# Patient Record
Sex: FEMALE | Race: BLACK OR AFRICAN AMERICAN | NOT HISPANIC OR LATINO | Employment: OTHER | URBAN - METROPOLITAN AREA
[De-identification: names, ages, dates, MRNs, and addresses within clinical notes are randomized per-mention and may not be internally consistent; named-entity substitution may affect disease eponyms.]

---

## 2017-08-18 ENCOUNTER — DOCUMENTATION ONLY (OUTPATIENT)
Dept: FAMILY MEDICINE | Facility: CLINIC | Age: 77
End: 2017-08-18

## 2017-08-18 NOTE — PROGRESS NOTES
Pre-Visit Chart Review  For Appointment Scheduled on 08/21/2017      Health Maintenance Due   Topic Date Due    Foot Exam  09[birth year]01/1950    TETANUS VACCINE  09/01/1960    DEXA SCAN  09/01/1982    Colonoscopy  09/01/1992    Zoster Vaccine  09/01/2002    Pneumococcal (65+) (1 of 2 - PCV13) 09/01/2007    Urine Microalbumin  03/27/2016    Hemoglobin A1c  06/18/2016    Eye Exam  05/24/2017    Influenza Vaccine  08/01/2017

## 2017-08-21 ENCOUNTER — OFFICE VISIT (OUTPATIENT)
Dept: FAMILY MEDICINE | Facility: CLINIC | Age: 77
End: 2017-08-21
Payer: MEDICARE

## 2017-08-21 VITALS
WEIGHT: 269.19 LBS | DIASTOLIC BLOOD PRESSURE: 67 MMHG | HEART RATE: 95 BPM | BODY MASS INDEX: 40.8 KG/M2 | SYSTOLIC BLOOD PRESSURE: 116 MMHG | TEMPERATURE: 98 F | HEIGHT: 68 IN

## 2017-08-21 DIAGNOSIS — E66.01 MORBID OBESITY, UNSPECIFIED OBESITY TYPE: ICD-10-CM

## 2017-08-21 DIAGNOSIS — E11.59 HYPERTENSION ASSOCIATED WITH DIABETES: ICD-10-CM

## 2017-08-21 DIAGNOSIS — E11.69 HYPERLIPIDEMIA ASSOCIATED WITH TYPE 2 DIABETES MELLITUS: ICD-10-CM

## 2017-08-21 DIAGNOSIS — E03.9 HYPOTHYROIDISM, UNSPECIFIED TYPE: ICD-10-CM

## 2017-08-21 DIAGNOSIS — I15.2 HYPERTENSION ASSOCIATED WITH DIABETES: ICD-10-CM

## 2017-08-21 DIAGNOSIS — E11.42 DIABETIC POLYNEUROPATHY ASSOCIATED WITH TYPE 2 DIABETES MELLITUS: ICD-10-CM

## 2017-08-21 DIAGNOSIS — E78.5 HYPERLIPIDEMIA ASSOCIATED WITH TYPE 2 DIABETES MELLITUS: ICD-10-CM

## 2017-08-21 DIAGNOSIS — I73.9 PVD (PERIPHERAL VASCULAR DISEASE) WITH CLAUDICATION: ICD-10-CM

## 2017-08-21 DIAGNOSIS — Z23 NEED FOR VACCINATION FOR STREP PNEUMONIAE: Primary | ICD-10-CM

## 2017-08-21 DIAGNOSIS — Z00.00 ENCOUNTER FOR PREVENTIVE HEALTH EXAMINATION: ICD-10-CM

## 2017-08-21 PROCEDURE — 99999 PR PBB SHADOW E&M-EST. PATIENT-LVL III: CPT | Mod: PBBFAC,,, | Performed by: PHYSICIAN ASSISTANT

## 2017-08-21 PROCEDURE — 90732 PPSV23 VACC 2 YRS+ SUBQ/IM: CPT | Mod: S$GLB,,, | Performed by: PHYSICIAN ASSISTANT

## 2017-08-21 PROCEDURE — G0439 PPPS, SUBSEQ VISIT: HCPCS | Mod: S$GLB,,, | Performed by: PHYSICIAN ASSISTANT

## 2017-08-21 PROCEDURE — G0009 ADMIN PNEUMOCOCCAL VACCINE: HCPCS | Mod: S$GLB,,, | Performed by: PHYSICIAN ASSISTANT

## 2017-08-21 RX ORDER — METFORMIN HYDROCHLORIDE 500 MG/1
500 TABLET ORAL 2 TIMES DAILY WITH MEALS
COMMUNITY

## 2017-08-21 RX ORDER — METOPROLOL SUCCINATE 50 MG/1
50 TABLET, EXTENDED RELEASE ORAL 2 TIMES DAILY
Status: ON HOLD | COMMUNITY
End: 2023-06-26 | Stop reason: HOSPADM

## 2017-08-21 RX ORDER — LISINOPRIL 5 MG/1
5 TABLET ORAL DAILY
COMMUNITY
End: 2020-06-03

## 2017-08-21 RX ORDER — GABAPENTIN 300 MG/1
300 CAPSULE ORAL 2 TIMES DAILY
Status: ON HOLD | COMMUNITY
End: 2023-09-05

## 2017-08-21 RX ORDER — CILOSTAZOL 100 MG/1
100 TABLET ORAL 2 TIMES DAILY
COMMUNITY
End: 2022-12-08 | Stop reason: CLARIF

## 2017-08-21 NOTE — PROGRESS NOTES
"Mary Cryer presented for a  Medicare AWV and comprehensive Health Risk Assessment today. The following components were reviewed and updated:    · Medical history  · Family History  · Social history  · Allergies and Current Medications  · Health Risk Assessment  · Health Maintenance  · Care Team     ** See Completed Assessments for Annual Wellness Visit within the encounter summary.**       The following assessments were completed:  · Living Situation  · CAGE  · Depression Screening  · Timed Get Up and Go  · Whisper Test  · Cognitive Function Screening  · Nutrition Screening  · ADL Screening  · PAQ Screening    Vitals:    08/21/17 0909   BP: 116/67   BP Location: Left arm   Patient Position: Sitting   BP Method: Large (Automatic)   Pulse: 95   Temp: 97.8 °F (36.6 °C)   TempSrc: Oral   Weight: 122.1 kg (269 lb 2.9 oz)   Height: 5' 8" (1.727 m)     Body mass index is 40.93 kg/m².  Physical Exam   Constitutional: She is oriented to person, place, and time. She appears well-developed and well-nourished.   Cardiovascular:   Pulses:       Dorsalis pedis pulses are 2+ on the right side, and 2+ on the left side.        Posterior tibial pulses are 2+ on the right side, and 2+ on the left side.   Pulmonary/Chest: Effort normal.   Musculoskeletal:        Right foot: There is normal range of motion and no deformity.        Left foot: There is normal range of motion and no deformity.   Feet:   Right Foot:   Protective Sensation: 5 sites tested. 5 sites sensed.   Skin Integrity: Negative for ulcer, blister, skin breakdown, erythema, warmth, callus or dry skin.   Left Foot:   Protective Sensation: 5 sites tested. 5 sites sensed.   Skin Integrity: Negative for ulcer, blister, skin breakdown, erythema, warmth, callus or dry skin.   Neurological: She is alert and oriented to person, place, and time.   Psychiatric: She has a normal mood and affect. Her behavior is normal. Judgment and thought content normal.               Diagnoses and " health risks identified today and associated recommendations/orders:    Karina was seen today for health risk assessment.    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, with long-term current use of insulin  Comments:  uncontrolled, managed by pcp, records requested    Hyperlipidemia associated with type 2 diabetes mellitus  Comments:  controlled, continue meds    Hypertension associated with diabetes  Comments:  controlled, continue meds    Diabetic polyneuropathy associated with type 2 diabetes mellitus  Comments:  uncontrolled, followed by pcp    Hypothyroidism, unspecified type  Comments:  controlled, continue meds    Encounter for preventive health examination    Other orders  -     Cancel: Microalbumin/creatinine urine ratio; Future        Provided Karina with a 5-10 year written screening schedule and personal prevention plan. Recommendations were developed using the USPSTF age appropriate recommendations. Education, counseling, and referrals were provided as needed. After Visit Summary printed and given to patient which includes a list of additional screenings\tests needed.    No Follow-up on file.    MOSES Mendez     Patient readiness: acceptance and barriers:none    During the course of the visit the patient was educated and counseled about the following:     Diabetes:  Discussed general issues about diabetes pathophysiology and management.  Hypertension:   Regular aerobic exercise.  Obesity:   General weight loss/lifestyle modification strategies discussed (elicit  support from others; identify saboteurs; non-food rewards, etc).    Goals: Diabetes: Maintain Hemoglobin A1C below 7, Hypertension: Reduce Blood Pressure and Obesity: Reduce calorie intake and BMI    Did patient meet goals/outcomes: No    The following self management tools provided: blood pressure log  blood glucose log  excercise log    Patient Instructions (the written plan) was given to the patient/family.     Time spent with patient: 55 minutes

## 2017-08-21 NOTE — Clinical Note
Primary Care Providers: Sharan John MD, MD (General)  Your patient was seen today for a HRA visit. Gap(s) in care (HEDIS gaps) have been identified during this visit that require additional testing and possible follow up.  Orders Placed This Encounter     (In Office Administered) Pneumococcal Polysaccharide Vaccine (23 Valent) (SQ/IM)   These orders were placed using [facility] approved protocol and any results will be forwarded to your office for appropriate follow up. I have included a copy of my visit note; please review the note and feel free to contact me with any questions.   Thank you for allowing me to participate in the care of your patients. MOSES Mendez

## 2017-08-21 NOTE — PATIENT INSTRUCTIONS
Weight Management: Getting Started  Healthy bodies come in all shapes and sizes. Not all bodies are made to be thin. For some people, a healthy weight is higher than the average weight listed on weight charts. Your healthcare provider can help you decide on a healthy weight for you.    Reasons to lose weight  Losing weight can help with some health problems, such as high blood pressure, heart disease, diabetes, sleep apnea, and arthritis. You may also feel more energy.  Set your long-term goal  Your goal doesn't even have to be a specific weight. You may decide on a fitness goal (such as being able to walk 10 miles a week), or a health goal (such as lowering your blood pressure). Choose a goal that is measurable and reasonable, so you know when you've reached it. A goal of reaching a BMI of less than 25 is not always reasonable (or possible).   Make an action plan  Habits dont change overnight. Setting your goals too high can leave you feeling discouraged if you cant reach them. Be realistic. Choose one or two small changes you can make now. Set an action plan for how you are going to make these changes. When you can stick to this plan, keep making a few more small changes. Taking small steps will help you stay on the path to success.  Track your progress  Write down your goals. Then, keep a daily record of your progress. Write down what you eat and how active you are. This record lets you look back on how much youve done. It may also help when youre feeling frustrated. Reward yourself for success. Even if you dont reach every goal, give yourself credit for what you do get done.  Get support  Encouragement from others can help make losing weight easier. Ask your family members and friends for support. They may even want to join you. Also look to your healthcare provider, registered dietitian, and  for help. Your local hospital can give you more information about nutrition, exercise, and  weight loss.  Date Last Reviewed: 1/31/2016 © 2000-2016 [employer]. [address] [place] [address], [place] [address]. All rights reserved. This information is not intended as a substitute for professional medical care. Always follow your healthcare professional's instructions.        Walking for Fitness  Fitness walking has something for everyone, even people who are already fit. Walking is one of the safest ways to condition your body aerobically. It can boost energy, help you lose weight, and reduce stress.    Physical benefits  · Walking strengthens your heart and lungs, and tones your muscles.  · When walking, your feet land with less impact than in other sports. This reduces chances of muscle, bone, and joint injury.  · Regular walking improves your cholesterol levels and lowers your risk of heart disease. And it helps you control your blood sugar if you have diabetes.  · Walking is a weight-bearing activity, which helps maintain bone density. This can help prevent osteoporosis.  Personal rewards  · Taking walks can help you relax and manage stress. And fitness walking may make you feel better about yourself.  · Walking can help you sleep better at night and make you less likely to be depressed.  · Regular walking may help maintain your memory as you get older.  · Walking is a great way to spend extra time with friends and family members. Be sure to invite your dog along!  Q&A about fitness walking  Q: Will walking keep me fit?  A: Yes. Regular walking at the right pace gives you all the benefits of other aerobic activities, such as jogging and swimming.  Q: Will walking help me lose weight and keep it off?  A: Yes. Per mile, walking can burn as many calories as jogging. Your health care provider can help work walking into your weight-loss plan.  Q: Is walking safe for my health?  A: Yes. Walking is safe if you have high blood pressure, diabetes, heart disease, or other conditions. Talk to your health  care provider before you start.  Date Last Reviewed: 5/9/2015  © 9766-0126 [employer]. [address] [place] [address], [place] [address]. All rights reserved. This information is not intended as a substitute for professional medical care. Always follow your healthcare professional's instructions.          Counseling and Referral of Other Preventative  (Italic type indicates deductible and co-insurance are waived)    Patient Name: Mary Cryer  Today's Date: 8/21/2017      SERVICE LIMITATIONS RECOMMENDATION    Vaccines    · Pneumococcal (once after 65)    · Influenza (annually)    · Hepatitis B (if medium/high risk)    · Prevnar 13      Hepatitis B medium/high risk factors:       - End-stage renal disease       - Hemophiliacs who received Factor VII or         IX concentrates       - Clients of institutions for the mentally             retarded       - Persons who live in the same house as          a HepB carrier       - Homosexual men       - Illicit injectable drug abusers     Pneumococcal: Done, no repeat necessary     Influenza: Scheduled - see appointments     Hepatitis B: N/A     Prevnar 13: Done, no repeat necessary    Mammogram (biennial age 50-74)  Annually (age 40 or over)  Done this year, repeat every year    Pap (up to age 70 and after 70 if unknown history or abnormal study last 10 years)    Done this year, repeat every year     The USPSTF recommends against screening for cervical cancer in women who have had a hysterectomy with removal of the cervix and who do not have a history of a high-grade precancerous lesion (cervical intraepithelial neoplasia [MARTA] grade 2 or 3) or cervical cancer.     Colorectal cancer screening (to age 75)    · Fecal occult blood test (annual)  · Flexible sigmoidoscopy (5y)  · Screening colonoscopy (10y)  · Barium enema   N/A    Diabetes self-management training (no USPSTF recommendations)  Requires referral by treating physician for patient with diabetes or renal disease.  10 hours of initial DSMT sessions of no less than 30 minutes each in a continuous 12-month period. 2 hours of follow-up DSMT in subsequent years.  Done this year, repeat every year    Bone mass measurements (age 65 & older, biennial)  Requires diagnosis related to osteoporosis or estrogen deficiency. Biennial benefit unless patient has history of long-term glucocorticoid  Done this year, repeat every year    Glaucoma screening (no USPSTF recommendation)  Diabetes mellitus, family history   , age 50 or over    American, age 65 or over  Recommend follow up with eye care professional regularly    Medical nutrition therapy for diabetes or renal disease (no recommended schedule)  Requires referral by treating physician for patient with diabetes or renal disease or kidney transplant within the past 3 years.  Can be provided in same year as diabetes self-management training (DSMT), and CMS recommends medical nutrition therapy take place after DSMT. Up to 3 hours for initial year and 2 hours in subsequent years.  Done this year, repeat every year    Cardiovascular screening blood tests (every 5 years)  · Fasting lipid panel  Order as a panel if possible  Done this year, repeat every year    Diabetes screening tests (at least every 3 years, Medicare covers annually or at 6-month intervals for prediabetic patients)  · Fasting blood sugar (FBS) or glucose tolerance test (GTT)  Patient must be diagnosed with one of the following:       - Hypertension       - Dyslipidemia       - Obesity (BMI 30kg/m2)       - Previous elevated impaired FBS or GTT       ... or any two of the following:       - Overweight (BMI 25 but <30)       - Family history of diabetes       - Age 65 or older       - History of gestational diabetes or birth of baby weighing more than 9 pounds  Done this year, repeat every year    HIV screening (annually for increased risk patients)  · HIV-1 and HIV-2 by EIA, or NAVEEN, rapid antibody  test or oral mucosa transudate  Patients must be at increased risk for HIV infection per USPSTF guidelines or pregnant. Tests covered annually for patient at increased risk or as requested by the patient. Pregnant patients may receive up to 3 tests during pregnancy.  Risks discussed, screening is not recommended    Smoking cessation counseling (up to 8 sessions per year)  Patients must be asymptomatic of tobacco-related conditions to receive as a preventative service.  Non-smoker    Subsequent annual wellness visit  At least 12 months since last AWV  Return in one year     The following information is provided to all patients.  This information is to help you find resources for any of the problems found today that may be affecting your health:                Living healthy guide: www.[facility].[place].[facility]      Understanding Diabetes: www.diabetes.org      Eating healthy: www.cdc.gov/healthyweight      CDC home safety checklist: www.cdc.gov/steadi/patient.html      Agency on Aging: www.goea.[place].[facility]      Alcoholics anonymous (AA): www.aa.org      Physical Activity: www.dylan.nih.gov/sh6vwtf      Tobacco use: www.quitwithusla.org

## 2017-12-27 RX ORDER — LISINOPRIL 5 MG/1
TABLET ORAL
Qty: 90 TABLET | Refills: 3 | OUTPATIENT
Start: 2017-12-27

## 2017-12-27 RX ORDER — POTASSIUM CHLORIDE 600 MG/1
CAPSULE, EXTENDED RELEASE ORAL
Qty: 90 CAPSULE | Refills: 2 | OUTPATIENT
Start: 2017-12-27

## 2018-01-15 RX ORDER — CILOSTAZOL 100 MG/1
TABLET ORAL
Qty: 180 TABLET | Refills: 2 | OUTPATIENT
Start: 2018-01-15

## 2018-03-15 RX ORDER — GABAPENTIN 300 MG/1
CAPSULE ORAL
Qty: 270 CAPSULE | Refills: 2 | OUTPATIENT
Start: 2018-03-15

## 2018-04-17 RX ORDER — LISINOPRIL 5 MG/1
TABLET ORAL
Qty: 90 TABLET | Refills: 3 | OUTPATIENT
Start: 2018-04-17

## 2018-04-24 RX ORDER — LEVOTHYROXINE SODIUM 25 UG/1
TABLET ORAL
Qty: 90 TABLET | Refills: 3 | OUTPATIENT
Start: 2018-04-24

## 2019-12-04 ENCOUNTER — OFFICE VISIT (OUTPATIENT)
Dept: PODIATRY | Facility: CLINIC | Age: 79
End: 2019-12-04
Payer: MEDICARE

## 2019-12-04 ENCOUNTER — HOSPITAL ENCOUNTER (OUTPATIENT)
Dept: RADIOLOGY | Facility: CLINIC | Age: 79
Discharge: HOME | End: 2019-12-04
Attending: PODIATRIST
Payer: MEDICARE

## 2019-12-04 VITALS
DIASTOLIC BLOOD PRESSURE: 78 MMHG | SYSTOLIC BLOOD PRESSURE: 138 MMHG | HEIGHT: 68 IN | HEART RATE: 82 BPM | WEIGHT: 269 LBS | BODY MASS INDEX: 40.77 KG/M2

## 2019-12-04 DIAGNOSIS — I73.9 PVD (PERIPHERAL VASCULAR DISEASE) WITH CLAUDICATION: Primary | ICD-10-CM

## 2019-12-04 DIAGNOSIS — M79.675 TOE PAIN, LEFT: ICD-10-CM

## 2019-12-04 DIAGNOSIS — M21.6X2 PLANTARFLEXION DEFORMITY OF LEFT FOOT: ICD-10-CM

## 2019-12-04 DIAGNOSIS — M20.41 HAMMERTOE, BILATERAL: ICD-10-CM

## 2019-12-04 DIAGNOSIS — E11.49 TYPE II DIABETES MELLITUS WITH NEUROLOGICAL MANIFESTATIONS: ICD-10-CM

## 2019-12-04 DIAGNOSIS — M20.42 HAMMERTOE, BILATERAL: ICD-10-CM

## 2019-12-04 PROCEDURE — 11721 ROUTINE FOOT CARE: ICD-10-PCS | Mod: Q9,S$GLB,, | Performed by: PODIATRIST

## 2019-12-04 PROCEDURE — 1159F MED LIST DOCD IN RCRD: CPT | Mod: S$GLB,,, | Performed by: PODIATRIST

## 2019-12-04 PROCEDURE — 1101F PR PT FALLS ASSESS DOC 0-1 FALLS W/OUT INJ PAST YR: ICD-10-PCS | Mod: S$GLB,,, | Performed by: PODIATRIST

## 2019-12-04 PROCEDURE — 3078F PR MOST RECENT DIASTOLIC BLOOD PRESSURE < 80 MM HG: ICD-10-PCS | Mod: S$GLB,,, | Performed by: PODIATRIST

## 2019-12-04 PROCEDURE — 99203 OFFICE O/P NEW LOW 30 MIN: CPT | Mod: 25,S$GLB,, | Performed by: PODIATRIST

## 2019-12-04 PROCEDURE — 73630 X-RAY EXAM OF FOOT: CPT | Mod: LT,S$GLB,, | Performed by: PODIATRIST

## 2019-12-04 PROCEDURE — 3075F PR MOST RECENT SYSTOLIC BLOOD PRESS GE 130-139MM HG: ICD-10-PCS | Mod: S$GLB,,, | Performed by: PODIATRIST

## 2019-12-04 PROCEDURE — 3078F DIAST BP <80 MM HG: CPT | Mod: S$GLB,,, | Performed by: PODIATRIST

## 2019-12-04 PROCEDURE — 1101F PT FALLS ASSESS-DOCD LE1/YR: CPT | Mod: S$GLB,,, | Performed by: PODIATRIST

## 2019-12-04 PROCEDURE — 1159F PR MEDICATION LIST DOCUMENTED IN MEDICAL RECORD: ICD-10-PCS | Mod: S$GLB,,, | Performed by: PODIATRIST

## 2019-12-04 PROCEDURE — 11721 DEBRIDE NAIL 6 OR MORE: CPT | Mod: Q9,S$GLB,, | Performed by: PODIATRIST

## 2019-12-04 PROCEDURE — 73630 XR FOOT COMPLETE 3 VIEW LEFT: ICD-10-PCS | Mod: LT,S$GLB,, | Performed by: PODIATRIST

## 2019-12-04 PROCEDURE — 3075F SYST BP GE 130 - 139MM HG: CPT | Mod: S$GLB,,, | Performed by: PODIATRIST

## 2019-12-04 PROCEDURE — 99203 PR OFFICE/OUTPT VISIT, NEW, LEVL III, 30-44 MIN: ICD-10-PCS | Mod: 25,S$GLB,, | Performed by: PODIATRIST

## 2019-12-04 RX ORDER — CLOPIDOGREL BISULFATE 75 MG/1
75 TABLET ORAL DAILY
COMMUNITY
Start: 2019-11-12 | End: 2022-04-20

## 2019-12-04 RX ORDER — PEN NEEDLE, DIABETIC 29 G X1/2"
NEEDLE, DISPOSABLE MISCELLANEOUS
COMMUNITY

## 2019-12-04 RX ORDER — MELOXICAM 7.5 MG/1
7.5 TABLET ORAL DAILY
Status: ON HOLD | COMMUNITY
Start: 2019-04-02 | End: 2022-07-21 | Stop reason: HOSPADM

## 2019-12-04 RX ORDER — METOPROLOL TARTRATE 50 MG/1
25 TABLET ORAL 2 TIMES DAILY
COMMUNITY
Start: 2019-04-02 | End: 2022-12-08 | Stop reason: CLARIF

## 2019-12-04 RX ORDER — HUMAN INSULIN 100 [IU]/ML
6 INJECTION, SOLUTION SUBCUTANEOUS
COMMUNITY
Start: 2019-10-04

## 2019-12-04 RX ORDER — ALBUTEROL SULFATE 90 UG/1
2 AEROSOL, METERED RESPIRATORY (INHALATION) EVERY 6 HOURS PRN
COMMUNITY
Start: 2018-05-30

## 2019-12-04 RX ORDER — ASPIRIN 81 MG/1
81 TABLET ORAL DAILY
COMMUNITY

## 2019-12-04 RX ORDER — ROSUVASTATIN CALCIUM 40 MG/1
40 TABLET, COATED ORAL DAILY
COMMUNITY

## 2019-12-04 RX ORDER — INSULIN GLARGINE 100 [IU]/ML
35 INJECTION, SOLUTION SUBCUTANEOUS DAILY
COMMUNITY
Start: 2019-08-20

## 2019-12-04 RX ORDER — TRIAMTERENE/HYDROCHLOROTHIAZID 37.5-25 MG
1 TABLET ORAL DAILY
COMMUNITY
Start: 2019-04-29

## 2019-12-04 RX ORDER — AMOXICILLIN 500 MG
2 CAPSULE ORAL DAILY
COMMUNITY
End: 2022-04-20

## 2019-12-04 NOTE — PROGRESS NOTES
[address] [facility] [address]  [place] [address]  Phone: [phone]   [phone]    Patient's PCP:Sharan John MD  Referring Provider: No ref. provider found    Subjective:      Chief Complaint:: Nail Care (left first) and Foot Pain (under first toe)    HPI  Mary Cynthia Cryer is a 79 y.o. female who presents with a complaint of  First toenail problem.Onset of the symptoms was I had a sore at the base of the nail end of Septemeber. I dressed it daily with neosporin and betadine.The nail fell off and grew back different.Also having some pain under my big toe off and on about a month after the nail problem.  Patient also states that the distal tips of her toes hurt.  She states she slides in closed toe shoes.  Therefore she states she cant wear closed in shoes.Pain with walking and standing.It doesn't bother me with open toe shoes. Patients rates pain 8/10 on pain scale.who presents to the clinic for a diabetic foot exam.Patient states she cant walk very far without leg pain. Seeing Dr. Armas.      Systemic Doctor: Hollie Morales MD  Date Last Seen: 11-19  Blood Sugar: 141  Hemoglobin A1c:7     Vitals:    12/04/19 1006   BP: 138/78   Pulse: 82     Shoe Size:   11  Past Surgical History:   Procedure Laterality Date    CORONARY ARTERY BYPASS GRAFT      HYSTERECTOMY      partial     Past Medical History:   Diagnosis Date    Allergy     Diabetes mellitus     Diabetes mellitus, type 2     Hyperlipidemia     Hypertension     Obesity     Peripheral vascular disease     Thyroid disease     Type 2 diabetes mellitus      Family History   Problem Relation Age of Onset    Alzheimer's disease Mother     Heart disease Father         MI    No Known Problems Sister     Diabetes Brother     No Known Problems Daughter     No Known Problems Son     No Known Problems Sister     No Known Problems Sister     No Known Problems Sister     No Known Problems Sister     Heart disease Brother     No Known  Problems Brother     No Known Problems Son     No Known Problems Son         Social History:   Marital Status:   Alcohol History:  reports that she does not drink alcohol.  Tobacco History:  reports that she has been smoking. She has a 30.00 pack-year smoking history. She has never used smokeless tobacco.  Drug History:  reports that she does not use drugs.    Review of patient's allergies indicates:   Allergen Reactions    Tetracyclines Swelling       Current Outpatient Medications   Medication Sig Dispense Refill    albuterol (PROVENTIL/VENTOLIN HFA) 90 mcg/actuation inhaler Inhale 2 puffs into the lungs every 6 (six) hours as needed.      aspirin (ECOTRIN) 81 MG EC tablet Take 81 mg by mouth.      cilostazol (PLETAL) 100 MG Tab Take 50 mg by mouth 2 (two) times daily.      clopidogrel (PLAVIX) 75 mg tablet       gabapentin (NEURONTIN) 300 MG capsule Take 300 mg by mouth 3 (three) times daily.      insulin glargine (LANTUS) 100 unit/mL injection Inject 70 Units into the skin.      insulin NPH-insulin regular, 70/30, (NOVOLIN 70/30) 100 unit/mL (70-30) injection Inject 30 Units into the skin 3 (three) times daily.      levothyroxine (SYNTHROID) 25 MCG tablet Take 25 mcg by mouth once daily.        lisinopril (PRINIVIL,ZESTRIL) 5 MG tablet Take 5 mg by mouth once daily.      meloxicam (MOBIC) 7.5 MG tablet TAKE 1 TABLET EVERY DAY      metformin (GLUCOPHAGE) 500 MG tablet Take 500 mg by mouth 2 (two) times daily with meals.      metoprolol succinate (TOPROL-XL) 50 MG 24 hr tablet Take 50 mg by mouth 2 (two) times daily.      metoprolol tartrate (LOPRESSOR) 50 MG tablet TAKE 1 TABLET TWICE DAILY (TOTAL DOSE IS 75MG TWICE DAILY)      NOVOLIN R REGULAR U-100 INSULN 100 unit/mL injection       omega-3 fatty acids/fish oil (FISH OIL-OMEGA-3 FATTY ACIDS) 300-1,000 mg capsule Take 2 g by mouth.      potassium chloride (KLOR-CON) 8 MEQ TbSR Take 8 mEq by mouth once daily.        rosuvastatin  "(CRESTOR) 40 MG Tab Take 40 mg by mouth.      triamterene-hydrochlorothiazide 37.5-25 mg (MAXZIDE-25) 37.5-25 mg per tablet TAKE 1 TABLET EVERY DAY      atorvastatin (LIPITOR) 40 MG tablet Take 40 mg by mouth once daily.        glimepiride (AMARYL) 4 MG tablet Take 4 mg by mouth 2 (two) times daily.        insulin syringe-needle U-100 0.3 mL 30 gauge x 5/16" Syrg by Other route.      liraglutide 0.6 mg/0.1 mL, 18 mg/3 mL, subq PNIJ (VICTOZA 2-MACEY) 0.6 mg/0.1 mL (18 mg/3 mL) PnIj Inject 1.2 mg into the skin once daily.       No current facility-administered medications for this visit.        Review of Systems      Objective:        Physical Exam:   Foot Exam  Physical Exam  Physical examination: General: Pt. is well-developed, well-nourished, appears stated age, in no acute distress, alert and oriented x 3.    Vascular: Dorsalis pedis pulses are diminished bilaterally and posterior tibial pulses are 1/4 Bilaterally. Toes are cool to touch. Feet are warm proximally.    There is decreased digital hair. Skin is atrophic, slightly hyperpigmented, and mildly edematous. Capillary refill greater than 5 seconds all toes/distal feet    Neurologic: [place]pe-Radha 5.07 monofilament is decreased bilateral feet. Sharp/dull sensation absent Bilateral feet.    Vibratory sensation absent bilateral    Hammertoes 2,3 bilaterally.  Plantarflexed first metatarsals, bilaterally     Musculoskeletal: adequate joint range of motion without pain, limitation, nor crepitation Bilateral feet and ankle joints. Muscle strength is 5/5 in all groups bilaterally.    Lymphatics: no lymphangitic streaking bilaterally.    Dermatologic: Elongated, thickened, dystrophic, discolored nails x 10. Xerosis Bilaterally.      Patient instructed on proper foot hygeine. We discussed wearing proper shoe gear, daily foot inspections, never walking without protective shoe gear, never putting sharp instruments to feet, routine podiatric nail visits every 2-3 " "months. Nails were aggressively reduced and debrided x 10 mechanically and with electric  down to the nailbed in order to thin the nail plates. Pt. tolerated well and related comfort. Pt. to RTC in 2-3 months for follow-up. Pt. to wear extra-depth shoes and custom     Imaging:   An AP, oblique, and lateral view of left foot was obtained and reviewed.  There is no evidence of fracture or dislocation in the foot.    The joint spaces appear relatively well preserved.    Electronically Signed By: Louise Damon DPM           Assessment:       1. PVD (peripheral vascular disease) with claudication    2. Toe pain, left    3. Hammertoe, bilateral    4. Type II diabetes mellitus with neurological manifestations    5. Plantarflexion deformity of left foot      Plan:   PVD (peripheral vascular disease) with claudication  -     DIABETIC SHOES FOR HOME USE    Toe pain, left  -     X-Ray Foot Complete Left    Hammertoe, bilateral  -     DIABETIC SHOES FOR HOME USE    Type II diabetes mellitus with neurological manifestations  -     DIABETIC SHOES FOR HOME USE    Plantarflexion deformity of left foot    Other orders  -     Routine Foot Care      Follow up in about 3 months (around 3/4/2020).    Routine Foot Care  Date/Time: 12/4/2019 9:40 AM  Performed by: Louise Damon DPM  Authorized by: Louise Damon DPM     Time out: Immediately prior to procedure a "time out" was called to verify the correct patient, procedure, equipment, support staff and site/side marked as required.    Consent Done?:  Yes (Verbal)  Hyperkeratotic Skin Lesions?: No      Nail Care Type:  Debride  Location(s): All  (Left 1st Toe, Left 3rd Toe, Left 2nd Toe, Left 4th Toe, Left 5th Toe, Right 1st Toe, Right 2nd Toe, Right 3rd Toe, Right 4th Toe and Right 5th Toe)  Patient tolerance:  Patient tolerated the procedure well with no immediate complications     Instruments Used: Nail Nipper   Manually reduced with electric .             Counseling: "     I provided patient education verbally regarding:   Patient diagnosis, treatment options, as well as alternatives, risks, and benefits.     This note was created using Dragon voice recognition software that occasionally misinterpreted phrases or words.     Counseled patient on the aspects of diabetes and how it pertains to the feet.  I explained the importance of proper diabetic foot care and how it is essential for the health of their feet.      Shoe inspection. Patient instructed on proper foot hygeine. We discussed wearing proper shoe gear, daily foot inspections, never walking without protective shoe gear, never putting sharp instruments to feet, routine podiatric nail visits every 2-3 months.      Prescribed [facility] topical anti pain cream    Prescribe diabetic shoes due to high-risk status of patient.

## 2019-12-10 ENCOUNTER — OFFICE VISIT (OUTPATIENT)
Dept: PODIATRY | Facility: CLINIC | Age: 79
DRG: 300 | End: 2019-12-10
Payer: MEDICARE

## 2019-12-10 ENCOUNTER — HOSPITAL ENCOUNTER (INPATIENT)
Facility: HOSPITAL | Age: 79
LOS: 1 days | Discharge: HOME | DRG: 300 | End: 2019-12-12
Attending: EMERGENCY MEDICINE | Admitting: INTERNAL MEDICINE
Payer: MEDICARE

## 2019-12-10 VITALS
SYSTOLIC BLOOD PRESSURE: 159 MMHG | DIASTOLIC BLOOD PRESSURE: 83 MMHG | BODY MASS INDEX: 40.77 KG/M2 | WEIGHT: 269 LBS | HEART RATE: 86 BPM | HEIGHT: 68 IN

## 2019-12-10 DIAGNOSIS — I99.8 ISCHEMIC PAIN OF LEFT FOOT: Primary | ICD-10-CM

## 2019-12-10 DIAGNOSIS — M20.41 HAMMERTOE, BILATERAL: ICD-10-CM

## 2019-12-10 DIAGNOSIS — M79.672 ISCHEMIC PAIN OF LEFT FOOT: Primary | ICD-10-CM

## 2019-12-10 DIAGNOSIS — E11.49 TYPE II DIABETES MELLITUS WITH NEUROLOGICAL MANIFESTATIONS: ICD-10-CM

## 2019-12-10 DIAGNOSIS — M20.42 HAMMERTOE, BILATERAL: ICD-10-CM

## 2019-12-10 DIAGNOSIS — M79.672 LEFT FOOT PAIN: ICD-10-CM

## 2019-12-10 DIAGNOSIS — I73.9 PVD (PERIPHERAL VASCULAR DISEASE) WITH CLAUDICATION: Primary | ICD-10-CM

## 2019-12-10 DIAGNOSIS — M79.673 ISCHEMIC FOOT PAIN AT REST: ICD-10-CM

## 2019-12-10 DIAGNOSIS — I99.8 ISCHEMIC FOOT PAIN AT REST: ICD-10-CM

## 2019-12-10 LAB
ALBUMIN SERPL BCP-MCNC: 3.8 G/DL (ref 3.5–5.2)
ALP SERPL-CCNC: 54 U/L (ref 55–135)
ALT SERPL W/O P-5'-P-CCNC: 17 U/L (ref 10–44)
ANION GAP SERPL CALC-SCNC: 7 MMOL/L (ref 8–16)
AST SERPL-CCNC: 18 U/L (ref 10–40)
BASOPHILS # BLD AUTO: 0.05 K/UL (ref 0–0.2)
BASOPHILS NFR BLD: 0.9 % (ref 0–1.9)
BILIRUB SERPL-MCNC: 0.7 MG/DL (ref 0.1–1)
BUN SERPL-MCNC: 17 MG/DL (ref 8–23)
CALCIUM SERPL-MCNC: 9.4 MG/DL (ref 8.7–10.5)
CHLORIDE SERPL-SCNC: 106 MMOL/L (ref 95–110)
CO2 SERPL-SCNC: 30 MMOL/L (ref 23–29)
CREAT SERPL-MCNC: 1.1 MG/DL (ref 0.5–1.4)
DIFFERENTIAL METHOD: ABNORMAL
EOSINOPHIL # BLD AUTO: 0.1 K/UL (ref 0–0.5)
EOSINOPHIL NFR BLD: 2.4 % (ref 0–8)
ERYTHROCYTE [DISTWIDTH] IN BLOOD BY AUTOMATED COUNT: 12.6 % (ref 11.5–14.5)
EST. GFR  (AFRICAN AMERICAN): 56 ML/MIN/1.73 M^2
EST. GFR  (NON AFRICAN AMERICAN): 48.5 ML/MIN/1.73 M^2
GLUCOSE SERPL-MCNC: 108 MG/DL (ref 70–110)
GLUCOSE SERPL-MCNC: 146 MG/DL (ref 70–110)
HCT VFR BLD AUTO: 41.7 % (ref 37–48.5)
HGB BLD-MCNC: 14.2 G/DL (ref 12–16)
IMM GRANULOCYTES # BLD AUTO: 0.02 K/UL (ref 0–0.04)
IMM GRANULOCYTES NFR BLD AUTO: 0.3 % (ref 0–0.5)
INR PPP: 1.1
LYMPHOCYTES # BLD AUTO: 1.8 K/UL (ref 1–4.8)
LYMPHOCYTES NFR BLD: 31.3 % (ref 18–48)
MAGNESIUM SERPL-MCNC: 2.1 MG/DL (ref 1.6–2.6)
MCH RBC QN AUTO: 34.1 PG (ref 27–31)
MCHC RBC AUTO-ENTMCNC: 34.1 G/DL (ref 32–36)
MCV RBC AUTO: 100 FL (ref 82–98)
MONOCYTES # BLD AUTO: 0.6 K/UL (ref 0.3–1)
MONOCYTES NFR BLD: 10.6 % (ref 4–15)
NEUTROPHILS # BLD AUTO: 3.1 K/UL (ref 1.8–7.7)
NEUTROPHILS NFR BLD: 54.5 % (ref 38–73)
NRBC BLD-RTO: 0 /100 WBC
PLATELET # BLD AUTO: 237 K/UL (ref 150–350)
PMV BLD AUTO: 9.5 FL (ref 9.2–12.9)
POTASSIUM SERPL-SCNC: 4 MMOL/L (ref 3.5–5.1)
PROT SERPL-MCNC: 7.1 G/DL (ref 6–8.4)
PROTHROMBIN TIME: 13.3 SEC (ref 10.6–14.8)
RBC # BLD AUTO: 4.16 M/UL (ref 4–5.4)
SODIUM SERPL-SCNC: 143 MMOL/L (ref 136–145)
TROPONIN I SERPL DL<=0.01 NG/ML-MCNC: <0.03 NG/ML (ref 0.02–0.04)
WBC # BLD AUTO: 5.76 K/UL (ref 3.9–12.7)

## 2019-12-10 PROCEDURE — 99285 EMERGENCY DEPT VISIT HI MDM: CPT | Mod: 25

## 2019-12-10 PROCEDURE — 3077F PR MOST RECENT SYSTOLIC BLOOD PRESSURE >= 140 MM HG: ICD-10-PCS | Mod: S$GLB,,, | Performed by: PODIATRIST

## 2019-12-10 PROCEDURE — 63600175 PHARM REV CODE 636 W HCPCS: Performed by: EMERGENCY MEDICINE

## 2019-12-10 PROCEDURE — G0378 HOSPITAL OBSERVATION PER HR: HCPCS

## 2019-12-10 PROCEDURE — 93005 ELECTROCARDIOGRAM TRACING: CPT

## 2019-12-10 PROCEDURE — 1101F PR PT FALLS ASSESS DOC 0-1 FALLS W/OUT INJ PAST YR: ICD-10-PCS | Mod: S$GLB,,, | Performed by: PODIATRIST

## 2019-12-10 PROCEDURE — 25000003 PHARM REV CODE 250: Performed by: INTERNAL MEDICINE

## 2019-12-10 PROCEDURE — 1159F PR MEDICATION LIST DOCUMENTED IN MEDICAL RECORD: ICD-10-PCS | Mod: S$GLB,,, | Performed by: PODIATRIST

## 2019-12-10 PROCEDURE — 96375 TX/PRO/DX INJ NEW DRUG ADDON: CPT

## 2019-12-10 PROCEDURE — 85025 COMPLETE CBC W/AUTO DIFF WBC: CPT

## 2019-12-10 PROCEDURE — 3079F PR MOST RECENT DIASTOLIC BLOOD PRESSURE 80-89 MM HG: ICD-10-PCS | Mod: S$GLB,,, | Performed by: PODIATRIST

## 2019-12-10 PROCEDURE — 96374 THER/PROPH/DIAG INJ IV PUSH: CPT

## 2019-12-10 PROCEDURE — 96372 THER/PROPH/DIAG INJ SC/IM: CPT | Mod: 59

## 2019-12-10 PROCEDURE — 99213 PR OFFICE/OUTPT VISIT, EST, LEVL III, 20-29 MIN: ICD-10-PCS | Mod: S$GLB,,, | Performed by: PODIATRIST

## 2019-12-10 PROCEDURE — 85610 PROTHROMBIN TIME: CPT

## 2019-12-10 PROCEDURE — 1101F PT FALLS ASSESS-DOCD LE1/YR: CPT | Mod: S$GLB,,, | Performed by: PODIATRIST

## 2019-12-10 PROCEDURE — 1159F MED LIST DOCD IN RCRD: CPT | Mod: S$GLB,,, | Performed by: PODIATRIST

## 2019-12-10 PROCEDURE — 84484 ASSAY OF TROPONIN QUANT: CPT

## 2019-12-10 PROCEDURE — 3079F DIAST BP 80-89 MM HG: CPT | Mod: S$GLB,,, | Performed by: PODIATRIST

## 2019-12-10 PROCEDURE — 1125F AMNT PAIN NOTED PAIN PRSNT: CPT | Mod: S$GLB,,, | Performed by: PODIATRIST

## 2019-12-10 PROCEDURE — 80053 COMPREHEN METABOLIC PANEL: CPT

## 2019-12-10 PROCEDURE — 99213 OFFICE O/P EST LOW 20 MIN: CPT | Mod: S$GLB,,, | Performed by: PODIATRIST

## 2019-12-10 PROCEDURE — 25000003 PHARM REV CODE 250: Performed by: EMERGENCY MEDICINE

## 2019-12-10 PROCEDURE — 83735 ASSAY OF MAGNESIUM: CPT

## 2019-12-10 PROCEDURE — 3077F SYST BP >= 140 MM HG: CPT | Mod: S$GLB,,, | Performed by: PODIATRIST

## 2019-12-10 PROCEDURE — 1125F PR PAIN SEVERITY QUANTIFIED, PAIN PRESENT: ICD-10-PCS | Mod: S$GLB,,, | Performed by: PODIATRIST

## 2019-12-10 RX ORDER — MELOXICAM 7.5 MG/1
7.5 TABLET ORAL DAILY
Status: DISCONTINUED | OUTPATIENT
Start: 2019-12-11 | End: 2019-12-12 | Stop reason: HOSPADM

## 2019-12-10 RX ORDER — ONDANSETRON 2 MG/ML
4 INJECTION INTRAMUSCULAR; INTRAVENOUS EVERY 8 HOURS PRN
Status: DISCONTINUED | OUTPATIENT
Start: 2019-12-10 | End: 2019-12-12 | Stop reason: HOSPADM

## 2019-12-10 RX ORDER — ATORVASTATIN CALCIUM 40 MG/1
40 TABLET, FILM COATED ORAL DAILY
Status: DISCONTINUED | OUTPATIENT
Start: 2019-12-11 | End: 2019-12-12 | Stop reason: HOSPADM

## 2019-12-10 RX ORDER — LISINOPRIL 5 MG/1
5 TABLET ORAL
Status: COMPLETED | OUTPATIENT
Start: 2019-12-10 | End: 2019-12-10

## 2019-12-10 RX ORDER — METOPROLOL SUCCINATE 50 MG/1
50 TABLET, EXTENDED RELEASE ORAL 2 TIMES DAILY
Status: DISCONTINUED | OUTPATIENT
Start: 2019-12-10 | End: 2019-12-12 | Stop reason: HOSPADM

## 2019-12-10 RX ORDER — HYDRALAZINE HYDROCHLORIDE 20 MG/ML
10 INJECTION INTRAMUSCULAR; INTRAVENOUS
Status: COMPLETED | OUTPATIENT
Start: 2019-12-10 | End: 2019-12-10

## 2019-12-10 RX ORDER — LISINOPRIL 5 MG/1
5 TABLET ORAL DAILY
Status: DISCONTINUED | OUTPATIENT
Start: 2019-12-11 | End: 2019-12-12 | Stop reason: HOSPADM

## 2019-12-10 RX ORDER — GABAPENTIN 300 MG/1
300 CAPSULE ORAL 3 TIMES DAILY
Status: DISCONTINUED | OUTPATIENT
Start: 2019-12-10 | End: 2019-12-12 | Stop reason: HOSPADM

## 2019-12-10 RX ORDER — HYDROCODONE BITARTRATE AND ACETAMINOPHEN 10; 325 MG/1; MG/1
1 TABLET ORAL EVERY 4 HOURS PRN
Status: DISCONTINUED | OUTPATIENT
Start: 2019-12-10 | End: 2019-12-12 | Stop reason: HOSPADM

## 2019-12-10 RX ORDER — HYDROCODONE BITARTRATE AND ACETAMINOPHEN 5; 325 MG/1; MG/1
1 TABLET ORAL EVERY 4 HOURS PRN
Status: DISCONTINUED | OUTPATIENT
Start: 2019-12-10 | End: 2019-12-12 | Stop reason: HOSPADM

## 2019-12-10 RX ORDER — ONDANSETRON 2 MG/ML
4 INJECTION INTRAMUSCULAR; INTRAVENOUS
Status: COMPLETED | OUTPATIENT
Start: 2019-12-10 | End: 2019-12-10

## 2019-12-10 RX ORDER — ENOXAPARIN SODIUM 100 MG/ML
40 INJECTION SUBCUTANEOUS EVERY 12 HOURS
Status: DISCONTINUED | OUTPATIENT
Start: 2019-12-10 | End: 2019-12-12 | Stop reason: HOSPADM

## 2019-12-10 RX ORDER — CLOPIDOGREL BISULFATE 75 MG/1
75 TABLET ORAL DAILY
Status: DISCONTINUED | OUTPATIENT
Start: 2019-12-11 | End: 2019-12-12 | Stop reason: HOSPADM

## 2019-12-10 RX ORDER — ROSUVASTATIN CALCIUM 20 MG/1
40 TABLET, COATED ORAL NIGHTLY
Status: DISCONTINUED | OUTPATIENT
Start: 2019-12-10 | End: 2019-12-12 | Stop reason: HOSPADM

## 2019-12-10 RX ORDER — ENOXAPARIN SODIUM 100 MG/ML
100 INJECTION SUBCUTANEOUS
Status: COMPLETED | OUTPATIENT
Start: 2019-12-10 | End: 2019-12-10

## 2019-12-10 RX ORDER — LEVOTHYROXINE SODIUM 25 UG/1
25 TABLET ORAL DAILY
Status: DISCONTINUED | OUTPATIENT
Start: 2019-12-11 | End: 2019-12-12 | Stop reason: HOSPADM

## 2019-12-10 RX ORDER — MORPHINE SULFATE 4 MG/ML
4 INJECTION, SOLUTION INTRAMUSCULAR; INTRAVENOUS
Status: COMPLETED | OUTPATIENT
Start: 2019-12-10 | End: 2019-12-10

## 2019-12-10 RX ORDER — ASPIRIN 81 MG/1
81 TABLET ORAL DAILY
Status: DISCONTINUED | OUTPATIENT
Start: 2019-12-11 | End: 2019-12-12 | Stop reason: HOSPADM

## 2019-12-10 RX ORDER — ACETAMINOPHEN 325 MG/1
650 TABLET ORAL EVERY 8 HOURS PRN
Status: DISCONTINUED | OUTPATIENT
Start: 2019-12-10 | End: 2019-12-12 | Stop reason: HOSPADM

## 2019-12-10 RX ORDER — CILOSTAZOL 50 MG/1
50 TABLET ORAL 2 TIMES DAILY
Status: DISCONTINUED | OUTPATIENT
Start: 2019-12-10 | End: 2019-12-12 | Stop reason: HOSPADM

## 2019-12-10 RX ADMIN — CILOSTAZOL 50 MG: 50 TABLET ORAL at 10:12

## 2019-12-10 RX ADMIN — ENOXAPARIN SODIUM 100 MG: 100 INJECTION SUBCUTANEOUS at 02:12

## 2019-12-10 RX ADMIN — ONDANSETRON 4 MG: 2 INJECTION INTRAMUSCULAR; INTRAVENOUS at 02:12

## 2019-12-10 RX ADMIN — ROSUVASTATIN CALCIUM 40 MG: 20 TABLET, FILM COATED ORAL at 10:12

## 2019-12-10 RX ADMIN — LISINOPRIL 5 MG: 5 TABLET ORAL at 04:12

## 2019-12-10 RX ADMIN — HYDRALAZINE HYDROCHLORIDE 10 MG: 20 INJECTION INTRAMUSCULAR; INTRAVENOUS at 06:12

## 2019-12-10 RX ADMIN — METOPROLOL SUCCINATE 50 MG: 50 TABLET, EXTENDED RELEASE ORAL at 10:12

## 2019-12-10 RX ADMIN — GABAPENTIN 300 MG: 300 CAPSULE ORAL at 10:12

## 2019-12-10 RX ADMIN — MORPHINE SULFATE 4 MG: 4 INJECTION INTRAVENOUS at 02:12

## 2019-12-10 NOTE — ASSESSMENT & PLAN NOTE
Full dose Lovenox, continue home meds except hold DM meds and start moderate sliding scale; NPO after midnight; consult Vascular Surgery

## 2019-12-10 NOTE — Clinical Note
45 ml injected throughout the case. 10 mL total wasted during the case. 55 mL total used in the case.

## 2019-12-10 NOTE — Clinical Note
Angiography performed of the ostial left . via hand injection with 20 mL of contrast. Multiple pictures for entire leg

## 2019-12-10 NOTE — HPI
79 year old female presented to ED complaining of blue left foot. She has known PVD, and has been studied in the past. She states that the foot is not painful. Personally discussed patient with ED physician, who stated that he had contacted the patient's Vascular Surgeon: he will take the patient to the cath lab in AM. Full Lovenox until then. The patient continues to smoke cigarettes. Discussed smoking cessation at length with patient. No CP/SOB.

## 2019-12-10 NOTE — SUBJECTIVE & OBJECTIVE
Past Medical History:   Diagnosis Date    Allergy     Diabetes mellitus     Diabetes mellitus, type 2     Hyperlipidemia     Hypertension     Obesity     Peripheral vascular disease     Thyroid disease     Type 2 diabetes mellitus        Past Surgical History:   Procedure Laterality Date    CORONARY ARTERY BYPASS GRAFT      HYSTERECTOMY      partial       Review of patient's allergies indicates:   Allergen Reactions    Tetracyclines Swelling       No current facility-administered medications on file prior to encounter.      Current Outpatient Medications on File Prior to Encounter   Medication Sig    albuterol (PROVENTIL/VENTOLIN HFA) 90 mcg/actuation inhaler Inhale 2 puffs into the lungs every 6 (six) hours as needed.    aspirin (ECOTRIN) 81 MG EC tablet Take 81 mg by mouth once daily.     atorvastatin (LIPITOR) 40 MG tablet Take 40 mg by mouth once daily.      cilostazol (PLETAL) 100 MG Tab Take 50 mg by mouth 2 (two) times daily.    clopidogrel (PLAVIX) 75 mg tablet Take 75 mg by mouth once daily.     gabapentin (NEURONTIN) 300 MG capsule Take 300 mg by mouth 3 (three) times daily.    insulin glargine (LANTUS) 100 unit/mL injection Inject 70 Units into the skin once daily.     levothyroxine (SYNTHROID) 25 MCG tablet Take 25 mcg by mouth once daily.      lisinopril (PRINIVIL,ZESTRIL) 5 MG tablet Take 5 mg by mouth once daily.    meloxicam (MOBIC) 7.5 MG tablet Take 7.5 mg by mouth once daily.     metformin (GLUCOPHAGE) 500 MG tablet Take 500 mg by mouth 2 (two) times daily with meals.    metoprolol succinate (TOPROL-XL) 50 MG 24 hr tablet Take 50 mg by mouth 2 (two) times daily. 25 mg + 50 mg = 75 mg BID    metoprolol tartrate (LOPRESSOR) 50 MG tablet Take 25 mg by mouth 2 (two) times daily. 25 mg+50 mg=75 mg BID    NOVOLIN R REGULAR U-100 INSULN 100 unit/mL injection Inject 30 Units into the skin 2 (two) times daily before meals.     omega-3 fatty acids/fish oil (FISH OIL-OMEGA-3 FATTY  "ACIDS) 300-1,000 mg capsule Take 2 g by mouth once daily.     potassium chloride (KLOR-CON) 8 MEQ TbSR Take 8 mEq by mouth once daily.      triamterene-hydrochlorothiazide 37.5-25 mg (MAXZIDE-25) 37.5-25 mg per tablet Take 1 tablet by mouth once daily.     glimepiride (AMARYL) 4 MG tablet Take 4 mg by mouth 2 (two) times daily.      insulin NPH-insulin regular, 70/30, (NOVOLIN 70/30) 100 unit/mL (70-30) injection Inject 30 Units into the skin 3 (three) times daily.    insulin syringe-needle U-100 0.3 mL 30 gauge x 5/16" Syrg by Other route.    liraglutide 0.6 mg/0.1 mL, 18 mg/3 mL, subq PNIJ (VICTOZA 2-MACEY) 0.6 mg/0.1 mL (18 mg/3 mL) PnIj Inject 1.2 mg into the skin once daily.    rosuvastatin (CRESTOR) 40 MG Tab Take 40 mg by mouth.     Family History     Problem Relation (Age of Onset)    Alzheimer's disease Mother    Diabetes Brother    Heart disease Father, Brother    No Known Problems Sister, Daughter, Son, Sister, Sister, Sister, Sister, Brother, Son, Son        Tobacco Use    Smoking status: Current Every Day Smoker     Packs/day: 0.50     Years: 60.00     Pack years: 30.00    Smokeless tobacco: Never Used   Substance and Sexual Activity    Alcohol use: No    Drug use: No    Sexual activity: Never     Review of Systems   Constitutional: Negative.    HENT: Negative.    Eyes: Negative.    Respiratory: Negative.    Cardiovascular: Negative.    Gastrointestinal: Negative.    Endocrine: Negative.    Genitourinary: Negative.    Musculoskeletal: Negative.    Skin: Negative.    Allergic/Immunologic: Negative.    Neurological: Negative.    Hematological: Negative.    All other systems reviewed and are negative.    Objective:     Vital Signs (Most Recent):  Temp: 98.2 °F (36.8 °C) (12/10/19 1214)  Pulse: 87 (12/10/19 1601)  Resp: 18 (12/10/19 1601)  BP: (!) 198/85 (12/10/19 1601)  SpO2: (!) 91 % (12/10/19 1601) Vital Signs (24h Range):  Temp:  [98.2 °F (36.8 °C)] 98.2 °F (36.8 °C)  Pulse:  [86-90] 87  Resp: "  [18] 18  SpO2:  [91 %-96 %] 91 %  BP: (159-198)/(83-85) 198/85     Weight: 122 kg (269 lb)  Body mass index is 40.9 kg/m².    Physical Exam   Constitutional: She is oriented to person, place, and time. She appears well-developed and well-nourished.   HENT:   Head: Normocephalic and atraumatic.   Eyes: Pupils are equal, round, and reactive to light. Conjunctivae and EOM are normal.   Neck: Normal range of motion. Neck supple.   Cardiovascular: Normal rate, regular rhythm, normal heart sounds and intact distal pulses.   Left foot ischemic, but warm. Non-tender to palp   Pulmonary/Chest: Effort normal and breath sounds normal.   Decreased entry bases without adventitious sounds   Abdominal: Soft. Bowel sounds are normal.   Musculoskeletal: Normal range of motion.   Neurological: She is alert and oriented to person, place, and time.   Skin: Skin is warm and dry. Capillary refill takes less than 2 seconds.   Psychiatric: She has a normal mood and affect. Her behavior is normal. Judgment and thought content normal.   Nursing note and vitals reviewed.        CRANIAL NERVES     CN III, IV, VI   Pupils are equal, round, and reactive to light.  Extraocular motions are normal.        Significant Labs:   BMP:   Recent Labs   Lab 12/10/19  1404         K 4.0      CO2 30*   BUN 17   CREATININE 1.1   CALCIUM 9.4   MG 2.1     CBC:   Recent Labs   Lab 12/10/19  1404   WBC 5.76   HGB 14.2   HCT 41.7          Significant Imaging: I have reviewed and interpreted all pertinent imaging results/findings within the past 24 hours.

## 2019-12-10 NOTE — PROGRESS NOTES
[address] [facility] [address]  [place] [address]  Phone: [phone]   [phone]    Patient's PCP:Sharan John MD  Referring Provider: No ref. provider found    Subjective:      Chief Complaint:: Foot Pain (left)    HPI  Mary Cynthia Cryer is a 79 y.o. female who presents with a complaint of left foot pain lasting since Sunday. Onset of the symptoms was no trauma.  Current symptoms include painful on top and side of foot.  Her foot is turning purple she states.  Aggravating factors are prolonged walking/standing. Treatment to date have included soaking, elevating. Patients rates pain 7-8/10 on pain scale.  Recently, she had a procedure done by Dr. Armas.  Her pain located under the ball of her foot,  which she had last visit is no longer there.  Now, her entire foot is in pain she states.      Systemic Doctor: Dr. Hollie Morales  Date Last Seen: 11/20/19  Blood Sugar: 132  Hemoglobin A1c: 7    Vitals:    12/10/19 1019   BP: (!) 159/83   Pulse: 86     Shoe Size: 11    Past Surgical History:   Procedure Laterality Date    CORONARY ARTERY BYPASS GRAFT      HYSTERECTOMY      partial     Past Medical History:   Diagnosis Date    Allergy     Diabetes mellitus     Diabetes mellitus, type 2     Hyperlipidemia     Hypertension     Obesity     Peripheral vascular disease     Thyroid disease     Type 2 diabetes mellitus      Family History   Problem Relation Age of Onset    Alzheimer's disease Mother     Heart disease Father         MI    No Known Problems Sister     Diabetes Brother     No Known Problems Daughter     No Known Problems Son     No Known Problems Sister     No Known Problems Sister     No Known Problems Sister     No Known Problems Sister     Heart disease Brother     No Known Problems Brother     No Known Problems Son     No Known Problems Son         Social History:   Marital Status:   Alcohol History:  reports that she does not drink alcohol.  Tobacco History:   "reports that she has been smoking. She has a 30.00 pack-year smoking history. She has never used smokeless tobacco.  Drug History:  reports that she does not use drugs.    Review of patient's allergies indicates:   Allergen Reactions    Tetracyclines Swelling       Current Outpatient Medications   Medication Sig Dispense Refill    albuterol (PROVENTIL/VENTOLIN HFA) 90 mcg/actuation inhaler Inhale 2 puffs into the lungs every 6 (six) hours as needed.      aspirin (ECOTRIN) 81 MG EC tablet Take 81 mg by mouth.      atorvastatin (LIPITOR) 40 MG tablet Take 40 mg by mouth once daily.        cilostazol (PLETAL) 100 MG Tab Take 50 mg by mouth 2 (two) times daily.      clopidogrel (PLAVIX) 75 mg tablet       gabapentin (NEURONTIN) 300 MG capsule Take 300 mg by mouth 3 (three) times daily.      glimepiride (AMARYL) 4 MG tablet Take 4 mg by mouth 2 (two) times daily.        insulin glargine (LANTUS) 100 unit/mL injection Inject 70 Units into the skin.      insulin NPH-insulin regular, 70/30, (NOVOLIN 70/30) 100 unit/mL (70-30) injection Inject 30 Units into the skin 3 (three) times daily.      insulin syringe-needle U-100 0.3 mL 30 gauge x 5/16" Syrg by Other route.      levothyroxine (SYNTHROID) 25 MCG tablet Take 25 mcg by mouth once daily.        liraglutide 0.6 mg/0.1 mL, 18 mg/3 mL, subq PNIJ (VICTOZA 2-MACEY) 0.6 mg/0.1 mL (18 mg/3 mL) PnIj Inject 1.2 mg into the skin once daily.      lisinopril (PRINIVIL,ZESTRIL) 5 MG tablet Take 5 mg by mouth once daily.      meloxicam (MOBIC) 7.5 MG tablet TAKE 1 TABLET EVERY DAY      metformin (GLUCOPHAGE) 500 MG tablet Take 500 mg by mouth 2 (two) times daily with meals.      metoprolol succinate (TOPROL-XL) 50 MG 24 hr tablet Take 50 mg by mouth 2 (two) times daily.      metoprolol tartrate (LOPRESSOR) 50 MG tablet TAKE 1 TABLET TWICE DAILY (TOTAL DOSE IS 75MG TWICE DAILY)      NOVOLIN R REGULAR U-100 INSULN 100 unit/mL injection       omega-3 fatty acids/fish " oil (FISH OIL-OMEGA-3 FATTY ACIDS) 300-1,000 mg capsule Take 2 g by mouth.      potassium chloride (KLOR-CON) 8 MEQ TbSR Take 8 mEq by mouth once daily.        rosuvastatin (CRESTOR) 40 MG Tab Take 40 mg by mouth.      triamterene-hydrochlorothiazide 37.5-25 mg (MAXZIDE-25) 37.5-25 mg per tablet TAKE 1 TABLET EVERY DAY       No current facility-administered medications for this visit.        Review of Systems      Objective:        Physical Exam:   Foot Exam  Physical Exam  Physical examination: General: Pt. is well-developed, well-nourished, appears stated age, in no acute distress, alert and oriented x 3.     Vascular: Dorsalis pedis pulses are diminished bilaterally and posterior tibial pulses are 1/4 Bilaterally. Toes are cool to touch.     The entire left foot has a mottled and purplish discoloration to the top of it.  There is decreased digital hair. Skin is atrophic, slightly hyperpigmented, and mildly edematous. Capillary refill greater than 5 seconds all toes/distal feet     Neurologic: [place]-Radha 5.07 monofilament is decreased bilateral feet. Sharp/dull sensation absent Bilateral feet.     Vibratory sensation absent bilateral     Hammertoes 2,3 bilaterally.  Plantarflexed first metatarsals, bilaterally      Musculoskeletal: adequate joint range of motion without pain, limitation, nor crepitation Bilateral feet and ankle joints. Muscle strength is 5/5 in all groups bilaterally.     Lymphatics: no lymphangitic streaking bilaterally.  Imaging:            Assessment:       1. PVD (peripheral vascular disease) with claudication    2. Hammertoe, bilateral    3. Type II diabetes mellitus with neurological manifestations    4. Left foot pain    5. Ischemic foot pain at rest - Left Foot      Plan:   PVD (peripheral vascular disease) with claudication    Hammertoe, bilateral    Type II diabetes mellitus with neurological manifestations    Left foot pain    Ischemic foot pain at rest - Left Foot      Follow  up in about 4 weeks (around 1/7/2020).    Procedures - None    Counseling:     I provided patient education verbally regarding:   Patient diagnosis, treatment options, as well as alternatives, risks, and benefits.     This note was created using Dragon voice recognition software that occasionally misinterpreted phrases or words.     Recommended and highly encouraged patient to go to the emergency room today due to her left foot turning colors and lack of blood flow.  Vascular surgery needs to assess her.

## 2019-12-10 NOTE — ED PROVIDER NOTES
Encounter Date: 12/10/2019       History     Chief Complaint   Patient presents with    Foot Pain     LEFT, NO INJURY, X 3-4 DAYS     Patient presents complaining of left foot pain and discoloration.  Patient states symptoms started Sunday.  Patient states she is status post recent procedure on the left leg for peripheral vascular disease by Dr. Armas.  At the worst symptoms are moderate.  She has had this before.        Review of patient's allergies indicates:   Allergen Reactions    Tetracyclines Swelling     Past Medical History:   Diagnosis Date    Allergy     Diabetes mellitus     Diabetes mellitus, type 2     Hyperlipidemia     Hypertension     Obesity     Peripheral vascular disease     Thyroid disease     Type 2 diabetes mellitus      Past Surgical History:   Procedure Laterality Date    CORONARY ARTERY BYPASS GRAFT      HYSTERECTOMY      partial     Family History   Problem Relation Age of Onset    Alzheimer's disease Mother     Heart disease Father         MI    No Known Problems Sister     Diabetes Brother     No Known Problems Daughter     No Known Problems Son     No Known Problems Sister     No Known Problems Sister     No Known Problems Sister     No Known Problems Sister     Heart disease Brother     No Known Problems Brother     No Known Problems Son     No Known Problems Son      Social History     Tobacco Use    Smoking status: Current Every Day Smoker     Packs/day: 0.50     Years: 60.00     Pack years: 30.00    Smokeless tobacco: Never Used   Substance Use Topics    Alcohol use: No    Drug use: No     Review of Systems   Musculoskeletal:        Foot pain   Skin: Positive for color change.   All other systems reviewed and are negative.      Physical Exam     Initial Vitals [12/10/19 1214]   BP Pulse Resp Temp SpO2   (!) 189/83 90 18 98.2 °F (36.8 °C) 96 %      MAP       --         Physical Exam    Nursing note and vitals reviewed.  Constitutional: She appears  well-developed and well-nourished.   HENT:   Head: Normocephalic and atraumatic.   Eyes: EOM are normal.   Neck: Normal range of motion. Neck supple.   Cardiovascular: Normal rate, regular rhythm, normal heart sounds and intact distal pulses.   Pulmonary/Chest: No respiratory distress.   Abdominal: Soft.   Musculoskeletal: Normal range of motion. She exhibits no edema.   Patient is able to wiggle her toes and move her foot.  She has feeling in the foot.   Neurological: She is alert and oriented to person, place, and time. She has normal strength.   Vision - Normal  Aphasia - Normal  Neglect - Normal  Pronator drift - Normal  Cerebellum - Normal  RUE strength - Normal  RLE strength - Normal  LUE strength - Normal  LLE strength - Normal   Skin: Skin is warm and dry. Capillary refill takes less than 2 seconds.   The left foot is discolored I am unable to palpate a pulse.   Psychiatric: She has a normal mood and affect. Her behavior is normal. Judgment and thought content normal.         ED Course   Procedures  Labs Reviewed   COMPREHENSIVE METABOLIC PANEL - Abnormal; Notable for the following components:       Result Value    CO2 30 (*)     Alkaline Phosphatase 54 (*)     Anion Gap 7 (*)     eGFR if  56.0 (*)     eGFR if non  48.5 (*)     All other components within normal limits   CBC W/ AUTO DIFFERENTIAL - Abnormal; Notable for the following components:    Mean Corpuscular Volume 100 (*)     Mean Corpuscular Hemoglobin 34.1 (*)     All other components within normal limits   MAGNESIUM   TROPONIN I   PROTIME-INR     EKG Readings: (Independently Interpreted)   EKG is normal sinus rhythm without acute ST changes     ECG Results          EKG 12-LEAD (In process)  Result time 12/10/19 13:36:23    In process by Interface, Lab In [facility] (12/10/19 13:36:23)                 Narrative:    Test Reason : R07.9,    Vent. Rate : 075 BPM     Atrial Rate : 075 BPM     P-R Int : 182 ms          QRS  Dur : 140 ms      QT Int : 436 ms       P-R-T Axes : 055 -55 050 degrees     QTc Int : 486 ms    Normal sinus rhythm  Left axis deviation  Right bundle branch block  Abnormal ECG  When compared with ECG of 16-MAR-1989 09:04,  Right bundle branch block is now Present    Referred By: DANIELA   SELF           Confirmed By:                             Imaging Results          US Lower Extremity Arteries Left (Final result)  Result time 12/10/19 13:35:40    Final result by Mando Talamantes MD (12/10/19 13:35:40)                 Impression:      1. Monophasic waveforms throughout the left lower extremity arterial system with scattered calcific plaques noted consistent with peripheral vascular disease.  2. Elevated peak systolic velocity at the left mid SFA suggesting stenosis.      Electronically signed by: Mando Talamantes MD  Date:    12/10/2019  Time:    13:35             Narrative:    EXAMINATION:  US LOWER EXTREMITY ARTERIES LEFT    CLINICAL HISTORY:  Pain in left leg    COMPARISON:  None.    FINDINGS:  Grayscale, color and spectral Doppler analysis of the left lower extremity arteries was performed. Monophasic waveforms are noted throughout the left lower extremity arterial system.  Scattered calcified plaques noted.    PEAK SYSTOLIC VELOCITIES:    Left  cm/sec    Left  cm/sec    Left PFA 72.2 cm/sec    Left popliteal artery 82.6 cm/sec    Left posterior tibial artery 65.4 cm/sec    Left anterior tibial artery 93.5 cm/sec    Left peroneal artery 104.8 cm/sec    Left dorsalis pedis artery 25.8 cm/sec                                 Medical Decision Making:   Differential Diagnosis:   I discussed the case with Dr. Armas who knows patient well did recent procedure on the left leg.  He recommends Lovenox and will see the patient in the hospital likely angiogram tomorrow.                   ED Course as of Dec 10 1457   Tue Dec 10, 2019   1216 Chronic L foot and leg pain. Seen in podiatry this morning and  last week for similar complaints.  Also being followed by vascular, Dr. Armas with reported procedure 2 weeks ago.  Angiogram of the left leg with ballooning of several blocked vessels.  Continues to complain of left foot pain with claudication symptoms.    [BF]      ED Course User Index  [BF] MOSES Wilhelm                Clinical Impression:       ICD-10-CM ICD-9-CM   1. Ischemic pain of left foot M79.672 729.5    I99.9 459.9                             Dov Gustafson MD  12/10/19 1545

## 2019-12-10 NOTE — H&P
[facility] Medicine  History & Physical    Patient Name: Mary Cynthia Cryer  MRN: [id number]  Admission Date: 12/10/2019  Attending Physician: Pepe Younger MD  Primary Care Provider: Hollie Morales NP         Patient information was obtained from patient and ER records.     Subjective:     Principal Problem:<principal problem not specified>    Chief Complaint:   Chief Complaint   Patient presents with    Foot Pain     LEFT, NO INJURY, X 3-4 DAYS        HPI: 79 year old female presented to ED complaining of blue left foot. She has known PVD, and has been studied in the past. She states that the foot is not painful. Personally discussed patient with ED physician, who stated that he had contacted the patient's Vascular Surgeon: he will take the patient to the cath lab in AM. Full Lovenox until then. The patient continues to smoke cigarettes. Discussed smoking cessation at length with patient. No CP/SOB.     Past Medical History:   Diagnosis Date    Allergy     Diabetes mellitus     Diabetes mellitus, type 2     Hyperlipidemia     Hypertension     Obesity     Peripheral vascular disease     Thyroid disease     Type 2 diabetes mellitus        Past Surgical History:   Procedure Laterality Date    CORONARY ARTERY BYPASS GRAFT      HYSTERECTOMY      partial       Review of patient's allergies indicates:   Allergen Reactions    Tetracyclines Swelling       No current facility-administered medications on file prior to encounter.      Current Outpatient Medications on File Prior to Encounter   Medication Sig    albuterol (PROVENTIL/VENTOLIN HFA) 90 mcg/actuation inhaler Inhale 2 puffs into the lungs every 6 (six) hours as needed.    aspirin (ECOTRIN) 81 MG EC tablet Take 81 mg by mouth once daily.     atorvastatin (LIPITOR) 40 MG tablet Take 40 mg by mouth once daily.      cilostazol (PLETAL) 100 MG Tab Take 50 mg by mouth 2 (two) times daily.    clopidogrel (PLAVIX) 75 mg tablet Take  "75 mg by mouth once daily.     gabapentin (NEURONTIN) 300 MG capsule Take 300 mg by mouth 3 (three) times daily.    insulin glargine (LANTUS) 100 unit/mL injection Inject 70 Units into the skin once daily.     levothyroxine (SYNTHROID) 25 MCG tablet Take 25 mcg by mouth once daily.      lisinopril (PRINIVIL,ZESTRIL) 5 MG tablet Take 5 mg by mouth once daily.    meloxicam (MOBIC) 7.5 MG tablet Take 7.5 mg by mouth once daily.     metformin (GLUCOPHAGE) 500 MG tablet Take 500 mg by mouth 2 (two) times daily with meals.    metoprolol succinate (TOPROL-XL) 50 MG 24 hr tablet Take 50 mg by mouth 2 (two) times daily. 25 mg + 50 mg = 75 mg BID    metoprolol tartrate (LOPRESSOR) 50 MG tablet Take 25 mg by mouth 2 (two) times daily. 25 mg+50 mg=75 mg BID    NOVOLIN R REGULAR U-100 INSULN 100 unit/mL injection Inject 30 Units into the skin 2 (two) times daily before meals.     omega-3 fatty acids/fish oil (FISH OIL-OMEGA-3 FATTY ACIDS) 300-1,000 mg capsule Take 2 g by mouth once daily.     potassium chloride (KLOR-CON) 8 MEQ TbSR Take 8 mEq by mouth once daily.      triamterene-hydrochlorothiazide 37.5-25 mg (MAXZIDE-25) 37.5-25 mg per tablet Take 1 tablet by mouth once daily.     glimepiride (AMARYL) 4 MG tablet Take 4 mg by mouth 2 (two) times daily.      insulin NPH-insulin regular, 70/30, (NOVOLIN 70/30) 100 unit/mL (70-30) injection Inject 30 Units into the skin 3 (three) times daily.    insulin syringe-needle U-100 0.3 mL 30 gauge x 5/16" Syrg by Other route.    liraglutide 0.6 mg/0.1 mL, 18 mg/3 mL, subq PNIJ (VICTOZA 2-MACEY) 0.6 mg/0.1 mL (18 mg/3 mL) PnIj Inject 1.2 mg into the skin once daily.    rosuvastatin (CRESTOR) 40 MG Tab Take 40 mg by mouth.     Family History     Problem Relation (Age of Onset)    Alzheimer's disease Mother    Diabetes Brother    Heart disease Father, Brother    No Known Problems Sister, Daughter, Son, Sister, Sister, Sister, Sister, Brother, Son, Son        Tobacco Use    " Smoking status: Current Every Day Smoker     Packs/day: 0.50     Years: 60.00     Pack years: 30.00    Smokeless tobacco: Never Used   Substance and Sexual Activity    Alcohol use: No    Drug use: No    Sexual activity: Never     Review of Systems   Constitutional: Negative.    HENT: Negative.    Eyes: Negative.    Respiratory: Negative.    Cardiovascular: Negative.    Gastrointestinal: Negative.    Endocrine: Negative.    Genitourinary: Negative.    Musculoskeletal: Negative.    Skin: Negative.    Allergic/Immunologic: Negative.    Neurological: Negative.    Hematological: Negative.    All other systems reviewed and are negative.    Objective:     Vital Signs (Most Recent):  Temp: 98.2 °F (36.8 °C) (12/10/19 1214)  Pulse: 87 (12/10/19 1601)  Resp: 18 (12/10/19 1601)  BP: (!) 198/85 (12/10/19 1601)  SpO2: (!) 91 % (12/10/19 1601) Vital Signs (24h Range):  Temp:  [98.2 °F (36.8 °C)] 98.2 °F (36.8 °C)  Pulse:  [86-90] 87  Resp:  [18] 18  SpO2:  [91 %-96 %] 91 %  BP: (159-198)/(83-85) 198/85     Weight: 122 kg (269 lb)  Body mass index is 40.9 kg/m².    Physical Exam   Constitutional: She is oriented to person, place, and time. She appears well-developed and well-nourished.   HENT:   Head: Normocephalic and atraumatic.   Eyes: Pupils are equal, round, and reactive to light. Conjunctivae and EOM are normal.   Neck: Normal range of motion. Neck supple.   Cardiovascular: Normal rate, regular rhythm, normal heart sounds and intact distal pulses.   Left foot ischemic, but warm. Non-tender to palp   Pulmonary/Chest: Effort normal and breath sounds normal.   Decreased entry bases without adventitious sounds   Abdominal: Soft. Bowel sounds are normal.   Musculoskeletal: Normal range of motion.   Neurological: She is alert and oriented to person, place, and time.   Skin: Skin is warm and dry. Capillary refill takes less than 2 seconds.   Psychiatric: She has a normal mood and affect. Her behavior is normal. Judgment and  thought content normal.   Nursing note and vitals reviewed.        CRANIAL NERVES     CN III, IV, VI   Pupils are equal, round, and reactive to light.  Extraocular motions are normal.        Significant Labs:   BMP:   Recent Labs   Lab 12/10/19  1404         K 4.0      CO2 30*   BUN 17   CREATININE 1.1   CALCIUM 9.4   MG 2.1     CBC:   Recent Labs   Lab 12/10/19  1404   WBC 5.76   HGB 14.2   HCT 41.7          Significant Imaging: I have reviewed and interpreted all pertinent imaging results/findings within the past 24 hours.    Assessment/Plan:     PVD (peripheral vascular disease)    Full dose Lovenox, continue home meds except hold DM meds and start moderate sliding scale; NPO after midnight; consult Vascular Surgery      VTE Risk Mitigation (From admission, onward)    None             Pepe Younger MD  Department of Hospital Medicine   [facility]

## 2019-12-11 LAB
ANION GAP SERPL CALC-SCNC: 8 MMOL/L (ref 8–16)
BUN SERPL-MCNC: 15 MG/DL (ref 8–23)
CALCIUM SERPL-MCNC: 8.9 MG/DL (ref 8.7–10.5)
CHLORIDE SERPL-SCNC: 107 MMOL/L (ref 95–110)
CO2 SERPL-SCNC: 29 MMOL/L (ref 23–29)
CREAT SERPL-MCNC: 1 MG/DL (ref 0.5–1.4)
EST. GFR  (AFRICAN AMERICAN): >60 ML/MIN/1.73 M^2
EST. GFR  (NON AFRICAN AMERICAN): 54.5 ML/MIN/1.73 M^2
GLUCOSE SERPL-MCNC: 105 MG/DL (ref 70–110)
GLUCOSE SERPL-MCNC: 274 MG/DL (ref 70–110)
GLUCOSE SERPL-MCNC: 94 MG/DL (ref 70–110)
GLUCOSE SERPL-MCNC: 95 MG/DL (ref 70–110)
POTASSIUM SERPL-SCNC: 3.7 MMOL/L (ref 3.5–5.1)
SODIUM SERPL-SCNC: 144 MMOL/L (ref 136–145)

## 2019-12-11 PROCEDURE — 36415 COLL VENOUS BLD VENIPUNCTURE: CPT

## 2019-12-11 PROCEDURE — C1894 INTRO/SHEATH, NON-LASER: HCPCS | Performed by: SURGERY

## 2019-12-11 PROCEDURE — 25000003 PHARM REV CODE 250: Performed by: INTERNAL MEDICINE

## 2019-12-11 PROCEDURE — 63600175 PHARM REV CODE 636 W HCPCS: Performed by: INTERNAL MEDICINE

## 2019-12-11 PROCEDURE — 36245 INS CATH ABD/L-EXT ART 1ST: CPT | Mod: LT | Performed by: SURGERY

## 2019-12-11 PROCEDURE — 75710 ARTERY X-RAYS ARM/LEG: CPT | Mod: LT | Performed by: SURGERY

## 2019-12-11 PROCEDURE — 27201423 OPTIME MED/SURG SUP & DEVICES STERILE SUPPLY: Performed by: SURGERY

## 2019-12-11 PROCEDURE — 80048 BASIC METABOLIC PNL TOTAL CA: CPT

## 2019-12-11 PROCEDURE — 21400001 HC TELEMETRY ROOM

## 2019-12-11 PROCEDURE — 99152 MOD SED SAME PHYS/QHP 5/>YRS: CPT | Performed by: SURGERY

## 2019-12-11 PROCEDURE — 63600175 PHARM REV CODE 636 W HCPCS: Performed by: SURGERY

## 2019-12-11 PROCEDURE — C1769 GUIDE WIRE: HCPCS | Performed by: SURGERY

## 2019-12-11 PROCEDURE — 94761 N-INVAS EAR/PLS OXIMETRY MLT: CPT | Mod: 59

## 2019-12-11 PROCEDURE — 25000003 PHARM REV CODE 250: Performed by: SURGERY

## 2019-12-11 RX ORDER — FENTANYL CITRATE 50 UG/ML
INJECTION, SOLUTION INTRAMUSCULAR; INTRAVENOUS
Status: DISCONTINUED | OUTPATIENT
Start: 2019-12-11 | End: 2019-12-12 | Stop reason: HOSPADM

## 2019-12-11 RX ORDER — INSULIN ASPART 100 [IU]/ML
1-10 INJECTION, SOLUTION INTRAVENOUS; SUBCUTANEOUS
Status: DISCONTINUED | OUTPATIENT
Start: 2019-12-11 | End: 2019-12-12 | Stop reason: HOSPADM

## 2019-12-11 RX ORDER — LIDOCAINE HYDROCHLORIDE 10 MG/ML
INJECTION, SOLUTION EPIDURAL; INFILTRATION; INTRACAUDAL; PERINEURAL
Status: DISCONTINUED | OUTPATIENT
Start: 2019-12-11 | End: 2019-12-12 | Stop reason: HOSPADM

## 2019-12-11 RX ORDER — IBUPROFEN 200 MG
24 TABLET ORAL
Status: DISCONTINUED | OUTPATIENT
Start: 2019-12-11 | End: 2019-12-12 | Stop reason: HOSPADM

## 2019-12-11 RX ORDER — GLUCAGON 1 MG
1 KIT INJECTION
Status: DISCONTINUED | OUTPATIENT
Start: 2019-12-11 | End: 2019-12-12 | Stop reason: HOSPADM

## 2019-12-11 RX ORDER — IBUPROFEN 200 MG
16 TABLET ORAL
Status: DISCONTINUED | OUTPATIENT
Start: 2019-12-11 | End: 2019-12-12 | Stop reason: HOSPADM

## 2019-12-11 RX ORDER — MIDAZOLAM HYDROCHLORIDE 1 MG/ML
INJECTION INTRAMUSCULAR; INTRAVENOUS
Status: DISCONTINUED | OUTPATIENT
Start: 2019-12-11 | End: 2019-12-12 | Stop reason: HOSPADM

## 2019-12-11 RX ORDER — SODIUM CHLORIDE 9 MG/ML
INJECTION, SOLUTION INTRAVENOUS CONTINUOUS
Status: DISCONTINUED | OUTPATIENT
Start: 2019-12-11 | End: 2019-12-12 | Stop reason: HOSPADM

## 2019-12-11 RX ADMIN — GABAPENTIN 300 MG: 300 CAPSULE ORAL at 08:12

## 2019-12-11 RX ADMIN — ENOXAPARIN SODIUM 40 MG: 100 INJECTION SUBCUTANEOUS at 08:12

## 2019-12-11 RX ADMIN — LISINOPRIL 5 MG: 5 TABLET ORAL at 08:12

## 2019-12-11 RX ADMIN — METOPROLOL SUCCINATE 50 MG: 50 TABLET, EXTENDED RELEASE ORAL at 08:12

## 2019-12-11 RX ADMIN — ROSUVASTATIN CALCIUM 40 MG: 20 TABLET, FILM COATED ORAL at 09:12

## 2019-12-11 RX ADMIN — GABAPENTIN 300 MG: 300 CAPSULE ORAL at 09:12

## 2019-12-11 RX ADMIN — ATORVASTATIN CALCIUM 40 MG: 40 TABLET, FILM COATED ORAL at 08:12

## 2019-12-11 RX ADMIN — INSULIN ASPART 3 UNITS: 100 INJECTION, SOLUTION INTRAVENOUS; SUBCUTANEOUS at 10:12

## 2019-12-11 RX ADMIN — MELOXICAM 7.5 MG: 7.5 TABLET ORAL at 08:12

## 2019-12-11 RX ADMIN — CILOSTAZOL 50 MG: 50 TABLET ORAL at 08:12

## 2019-12-11 RX ADMIN — ENOXAPARIN SODIUM 40 MG: 100 INJECTION SUBCUTANEOUS at 09:12

## 2019-12-11 RX ADMIN — LEVOTHYROXINE SODIUM 25 MCG: 25 TABLET ORAL at 08:12

## 2019-12-11 RX ADMIN — SODIUM CHLORIDE: 900 INJECTION INTRAVENOUS at 04:12

## 2019-12-11 RX ADMIN — METOPROLOL SUCCINATE 50 MG: 50 TABLET, EXTENDED RELEASE ORAL at 09:12

## 2019-12-11 RX ADMIN — CLOPIDOGREL BISULFATE 75 MG: 75 TABLET, FILM COATED ORAL at 08:12

## 2019-12-11 RX ADMIN — ASPIRIN 81 MG: 81 TABLET, DELAYED RELEASE ORAL at 08:12

## 2019-12-11 RX ADMIN — CILOSTAZOL 50 MG: 50 TABLET ORAL at 10:12

## 2019-12-11 NOTE — PLAN OF CARE
12/11/19 1559   Discharge Assessment   Assessment Type Discharge Planning Assessment     This CM called by pt family member Mrs Summer Kent from [phone] asking this CM to call the [employer] to get pt's family member back home. Called the [employer] and family had already started case #[id number], pt in peripheral angiogram this evening for her left foot, once have prognosis and if  needs to come home or not, will be answered more clearly after the procedure, the [employer] agreed and will call post procedure to same number and case number.

## 2019-12-11 NOTE — INTERVAL H&P NOTE
The patient has been examined and the H&P has been reviewed:    I concur with the findings and no changes have occurred since H&P was written.    Anesthesia/Surgery risks, benefits and alternative options discussed and understood by patient/family.          Active Hospital Problems    Diagnosis  POA    Ischemic pain of left foot [M79.672, I99.9]  Yes    PVD (peripheral vascular disease) [I73.9]  Unknown      Resolved Hospital Problems   No resolved problems to display.

## 2019-12-11 NOTE — PLAN OF CARE
Report received from Char DUVALL. Arrived from cath lab via stretcher. No complaints of pain or distress noted. Dressing to right groin CDI. Post angio instructions given. Resting in bed with call light in reach.

## 2019-12-11 NOTE — NURSING
Admitted to room 3018 from ER. Awake alert and oriented x 4. Some mild dyspnea on exertion with ambulation but oxygenation saturation remains within normal limits

## 2019-12-11 NOTE — PROGRESS NOTES
Left leg angiogram performed which shows no significant occlusive disease of the previous angioplasty site everything appears patent in the left SFA with 2 vessel runoff.  No further intervention necessary.

## 2019-12-11 NOTE — H&P (VIEW-ONLY)
[facility]  Vascular Surgery  Consult Note    Consults  Subjective:     Chief Complaint/Reason for Admission:  Left foot pain    History of Present Illness:  Patient is well known to me with a history of diffuse peripheral vascular disease of both lower extremities with mixed symptoms of claudication and radiculopathy in the past.  She recently underwent angiography and was found to have some moderate stenosis of the distal left SFA for which she underwent simple angioplasty.  She returned to the ER last night with pain in the left foot although today on evaluation she is feeling somewhat better but still has some discomfort.    Medications Prior to Admission   Medication Sig Dispense Refill Last Dose    albuterol (PROVENTIL/VENTOLIN HFA) 90 mcg/actuation inhaler Inhale 2 puffs into the lungs every 6 (six) hours as needed.   12/9/2019 at 21:00    aspirin (ECOTRIN) 81 MG EC tablet Take 81 mg by mouth once daily.    12/9/2019 at 21:00    cilostazol (PLETAL) 100 MG Tab Take 50 mg by mouth 2 (two) times daily.   12/9/2019 at 21:00    clopidogrel (PLAVIX) 75 mg tablet Take 75 mg by mouth once daily.    12/9/2019 at 09:00    gabapentin (NEURONTIN) 300 MG capsule Take 300 mg by mouth 2 (two) times daily.    12/9/2019 at 21:00    insulin glargine (LANTUS) 100 unit/mL injection Inject 70 Units into the skin once daily.    12/10/2019 at 08:15    levothyroxine (SYNTHROID) 25 MCG tablet Take 25 mcg by mouth once daily.     12/10/2019 at 08:15    lisinopril (PRINIVIL,ZESTRIL) 5 MG tablet Take 5 mg by mouth once daily.   12/9/2019 at 09:00    meloxicam (MOBIC) 7.5 MG tablet Take 7.5 mg by mouth once daily.    12/9/2019 at 09:00    metformin (GLUCOPHAGE) 500 MG tablet Take 500 mg by mouth 2 (two) times daily with meals.   12/9/2019 at 21:00    metoprolol succinate (TOPROL-XL) 50 MG 24 hr tablet Take 50 mg by mouth 2 (two) times daily. 25 mg + 50 mg = 75 mg BID   12/9/2019 at 21:00    metoprolol tartrate  "(LOPRESSOR) 50 MG tablet Take 25 mg by mouth 2 (two) times daily. 25 mg+50 mg=75 mg BID   12/9/2019 at 21:00    NOVOLIN R REGULAR U-100 INSULN 100 unit/mL injection Inject 30 Units into the skin 2 (two) times daily before meals.    12/10/2019 at 08:15    omega-3 fatty acids/fish oil (FISH OIL-OMEGA-3 FATTY ACIDS) 300-1,000 mg capsule Take 2 g by mouth once daily.    Past Month at Unknown time    potassium chloride (KLOR-CON) 8 MEQ TbSR Take 8 mEq by mouth once daily.     12/9/2019 at 09:00    triamterene-hydrochlorothiazide 37.5-25 mg (MAXZIDE-25) 37.5-25 mg per tablet Take 1 tablet by mouth once daily.    12/9/2019 at 09:00    insulin syringe-needle U-100 0.3 mL 30 gauge x 5/16" Syrg by Other route.   Taking    rosuvastatin (CRESTOR) 40 MG Tab Take 40 mg by mouth.   Taking       Review of patient's allergies indicates:   Allergen Reactions    Tetracyclines Swelling       Past Medical History:   Diagnosis Date    Allergy     Diabetes mellitus     Diabetes mellitus, type 2     Hyperlipidemia     Hypertension     Obesity     Peripheral vascular disease     Thyroid disease     Type 2 diabetes mellitus      Past Surgical History:   Procedure Laterality Date    CORONARY ARTERY BYPASS GRAFT      HYSTERECTOMY      partial     Family History     Problem Relation (Age of Onset)    Alzheimer's disease Mother    Diabetes Brother    Heart disease Father, Brother    No Known Problems Sister, Daughter, Son, Sister, Sister, Sister, Sister, Brother, Son, Son        Tobacco Use    Smoking status: Current Every Day Smoker     Packs/day: 0.25     Years: 60.00     Pack years: 15.00    Smokeless tobacco: Never Used   Substance and Sexual Activity    Alcohol use: No    Drug use: No    Sexual activity: Never     Review of Systems   Constitutional: Negative.    Respiratory: Negative.    Cardiovascular: Negative.    Gastrointestinal: Negative.    Musculoskeletal: Positive for arthralgias.   Skin: Negative.  "     Objective:     Vital Signs (Most Recent):  Temp: 97.9 °F (36.6 °C) (12/11/19 1143)  Pulse: 80 (12/11/19 1143)  Resp: 20 (12/11/19 1143)  BP: (!) 177/87 (12/11/19 1143)  SpO2: (!) 90 % (12/11/19 1143) Vital Signs (24h Range):  Temp:  [97.7 °F (36.5 °C)-98.1 °F (36.7 °C)] 97.9 °F (36.6 °C)  Pulse:  [78-87] 80  Resp:  [18-22] 20  SpO2:  [90 %-95 %] 90 %  BP: (138-227)/(68-91) 177/87     Weight: 123.3 kg (271 lb 13.2 oz)  Body mass index is 41.33 kg/m².        Physical Exam   Constitutional: She appears well-developed.   Neck: Normal range of motion. Neck supple. Carotid bruit is not present.   Cardiovascular: Normal rate and regular rhythm.   Pulses:       Femoral pulses are 2+ on the right side, and 2+ on the left side.       Posterior tibial pulses are 1+ on the right side, and 1+ on the left side.   Doppler pedal pulses bilaterally.  No ischemic ulcerations.   Pulmonary/Chest: Breath sounds normal.   Abdominal: Soft. Bowel sounds are normal.   Musculoskeletal: Normal range of motion.       Significant Labs:  CBC:   Recent Labs   Lab 12/10/19  1404   WBC 5.76   RBC 4.16   HGB 14.2   HCT 41.7      *   MCH 34.1*   MCHC 34.1     CMP:   Recent Labs   Lab 12/10/19  1404 12/11/19  0433    95   CALCIUM 9.4 8.9   ALBUMIN 3.8  --    PROT 7.1  --     144   K 4.0 3.7   CO2 30* 29    107   BUN 17 15   CREATININE 1.1 1.0   ALKPHOS 54*  --    ALT 17  --    AST 18  --    BILITOT 0.7  --        Significant Diagnostics:  U/S: No results found in the last 24 hours.    Assessment/Plan:   Plan for angiography of the left leg  Active Diagnoses:    Diagnosis Date Noted POA    Ischemic pain of left foot [M79.672, I99.9] 12/10/2019 Yes    PVD (peripheral vascular disease) [I73.9] 08/21/2017 Unknown      Problems Resolved During this Admission:       Thank you for your consult. I will follow-up with patient. Please contact us if you have any additional questions.    Cruz Armas MD  Vascular  Surgery  [facility]

## 2019-12-11 NOTE — CONSULTS
[facility]  Vascular Surgery  Consult Note    Consults  Subjective:     Chief Complaint/Reason for Admission:  Left foot pain    History of Present Illness:  Patient is well known to me with a history of diffuse peripheral vascular disease of both lower extremities with mixed symptoms of claudication and radiculopathy in the past.  She recently underwent angiography and was found to have some moderate stenosis of the distal left SFA for which she underwent simple angioplasty.  She returned to the ER last night with pain in the left foot although today on evaluation she is feeling somewhat better but still has some discomfort.    Medications Prior to Admission   Medication Sig Dispense Refill Last Dose    albuterol (PROVENTIL/VENTOLIN HFA) 90 mcg/actuation inhaler Inhale 2 puffs into the lungs every 6 (six) hours as needed.   12/9/2019 at 21:00    aspirin (ECOTRIN) 81 MG EC tablet Take 81 mg by mouth once daily.    12/9/2019 at 21:00    cilostazol (PLETAL) 100 MG Tab Take 50 mg by mouth 2 (two) times daily.   12/9/2019 at 21:00    clopidogrel (PLAVIX) 75 mg tablet Take 75 mg by mouth once daily.    12/9/2019 at 09:00    gabapentin (NEURONTIN) 300 MG capsule Take 300 mg by mouth 2 (two) times daily.    12/9/2019 at 21:00    insulin glargine (LANTUS) 100 unit/mL injection Inject 70 Units into the skin once daily.    12/10/2019 at 08:15    levothyroxine (SYNTHROID) 25 MCG tablet Take 25 mcg by mouth once daily.     12/10/2019 at 08:15    lisinopril (PRINIVIL,ZESTRIL) 5 MG tablet Take 5 mg by mouth once daily.   12/9/2019 at 09:00    meloxicam (MOBIC) 7.5 MG tablet Take 7.5 mg by mouth once daily.    12/9/2019 at 09:00    metformin (GLUCOPHAGE) 500 MG tablet Take 500 mg by mouth 2 (two) times daily with meals.   12/9/2019 at 21:00    metoprolol succinate (TOPROL-XL) 50 MG 24 hr tablet Take 50 mg by mouth 2 (two) times daily. 25 mg + 50 mg = 75 mg BID   12/9/2019 at 21:00    metoprolol tartrate  "(LOPRESSOR) 50 MG tablet Take 25 mg by mouth 2 (two) times daily. 25 mg+50 mg=75 mg BID   12/9/2019 at 21:00    NOVOLIN R REGULAR U-100 INSULN 100 unit/mL injection Inject 30 Units into the skin 2 (two) times daily before meals.    12/10/2019 at 08:15    omega-3 fatty acids/fish oil (FISH OIL-OMEGA-3 FATTY ACIDS) 300-1,000 mg capsule Take 2 g by mouth once daily.    Past Month at Unknown time    potassium chloride (KLOR-CON) 8 MEQ TbSR Take 8 mEq by mouth once daily.     12/9/2019 at 09:00    triamterene-hydrochlorothiazide 37.5-25 mg (MAXZIDE-25) 37.5-25 mg per tablet Take 1 tablet by mouth once daily.    12/9/2019 at 09:00    insulin syringe-needle U-100 0.3 mL 30 gauge x 5/16" Syrg by Other route.   Taking    rosuvastatin (CRESTOR) 40 MG Tab Take 40 mg by mouth.   Taking       Review of patient's allergies indicates:   Allergen Reactions    Tetracyclines Swelling       Past Medical History:   Diagnosis Date    Allergy     Diabetes mellitus     Diabetes mellitus, type 2     Hyperlipidemia     Hypertension     Obesity     Peripheral vascular disease     Thyroid disease     Type 2 diabetes mellitus      Past Surgical History:   Procedure Laterality Date    CORONARY ARTERY BYPASS GRAFT      HYSTERECTOMY      partial     Family History     Problem Relation (Age of Onset)    Alzheimer's disease Mother    Diabetes Brother    Heart disease Father, Brother    No Known Problems Sister, Daughter, Son, Sister, Sister, Sister, Sister, Brother, Son, Son        Tobacco Use    Smoking status: Current Every Day Smoker     Packs/day: 0.25     Years: 60.00     Pack years: 15.00    Smokeless tobacco: Never Used   Substance and Sexual Activity    Alcohol use: No    Drug use: No    Sexual activity: Never     Review of Systems   Constitutional: Negative.    Respiratory: Negative.    Cardiovascular: Negative.    Gastrointestinal: Negative.    Musculoskeletal: Positive for arthralgias.   Skin: Negative.  "     Objective:     Vital Signs (Most Recent):  Temp: 97.9 °F (36.6 °C) (12/11/19 1143)  Pulse: 80 (12/11/19 1143)  Resp: 20 (12/11/19 1143)  BP: (!) 177/87 (12/11/19 1143)  SpO2: (!) 90 % (12/11/19 1143) Vital Signs (24h Range):  Temp:  [97.7 °F (36.5 °C)-98.1 °F (36.7 °C)] 97.9 °F (36.6 °C)  Pulse:  [78-87] 80  Resp:  [18-22] 20  SpO2:  [90 %-95 %] 90 %  BP: (138-227)/(68-91) 177/87     Weight: 123.3 kg (271 lb 13.2 oz)  Body mass index is 41.33 kg/m².        Physical Exam   Constitutional: She appears well-developed.   Neck: Normal range of motion. Neck supple. Carotid bruit is not present.   Cardiovascular: Normal rate and regular rhythm.   Pulses:       Femoral pulses are 2+ on the right side, and 2+ on the left side.       Posterior tibial pulses are 1+ on the right side, and 1+ on the left side.   Doppler pedal pulses bilaterally.  No ischemic ulcerations.   Pulmonary/Chest: Breath sounds normal.   Abdominal: Soft. Bowel sounds are normal.   Musculoskeletal: Normal range of motion.       Significant Labs:  CBC:   Recent Labs   Lab 12/10/19  1404   WBC 5.76   RBC 4.16   HGB 14.2   HCT 41.7      *   MCH 34.1*   MCHC 34.1     CMP:   Recent Labs   Lab 12/10/19  1404 12/11/19  0433    95   CALCIUM 9.4 8.9   ALBUMIN 3.8  --    PROT 7.1  --     144   K 4.0 3.7   CO2 30* 29    107   BUN 17 15   CREATININE 1.1 1.0   ALKPHOS 54*  --    ALT 17  --    AST 18  --    BILITOT 0.7  --        Significant Diagnostics:  U/S: No results found in the last 24 hours.    Assessment/Plan:   Plan for angiography of the left leg  Active Diagnoses:    Diagnosis Date Noted POA    Ischemic pain of left foot [M79.672, I99.9] 12/10/2019 Yes    PVD (peripheral vascular disease) [I73.9] 08/21/2017 Unknown      Problems Resolved During this Admission:       Thank you for your consult. I will follow-up with patient. Please contact us if you have any additional questions.    Cruz Armas MD  Vascular  Surgery  [facility]

## 2019-12-11 NOTE — PROGRESS NOTES
No overnight events. States foot feels better than it did yesterday. Patient has been NPO since midnight in anticipation for angiogram with Dr. Armas

## 2019-12-12 VITALS
HEART RATE: 66 BPM | WEIGHT: 266.75 LBS | DIASTOLIC BLOOD PRESSURE: 65 MMHG | OXYGEN SATURATION: 99 % | RESPIRATION RATE: 20 BRPM | BODY MASS INDEX: 40.43 KG/M2 | HEIGHT: 68 IN | SYSTOLIC BLOOD PRESSURE: 168 MMHG | TEMPERATURE: 98 F

## 2019-12-12 PROBLEM — M79.672 ISCHEMIC PAIN OF LEFT FOOT: Status: RESOLVED | Noted: 2019-12-10 | Resolved: 2019-12-12

## 2019-12-12 PROBLEM — I99.8 ISCHEMIC PAIN OF LEFT FOOT: Status: RESOLVED | Noted: 2019-12-10 | Resolved: 2019-12-12

## 2019-12-12 LAB
ANION GAP SERPL CALC-SCNC: 5 MMOL/L (ref 8–16)
BASOPHILS # BLD AUTO: 0.05 K/UL (ref 0–0.2)
BASOPHILS NFR BLD: 0.8 % (ref 0–1.9)
BUN SERPL-MCNC: 19 MG/DL (ref 8–23)
CALCIUM SERPL-MCNC: 9.1 MG/DL (ref 8.7–10.5)
CHLORIDE SERPL-SCNC: 105 MMOL/L (ref 95–110)
CO2 SERPL-SCNC: 30 MMOL/L (ref 23–29)
CREAT SERPL-MCNC: 1.2 MG/DL (ref 0.5–1.4)
DIFFERENTIAL METHOD: ABNORMAL
EOSINOPHIL # BLD AUTO: 0.2 K/UL (ref 0–0.5)
EOSINOPHIL NFR BLD: 2.6 % (ref 0–8)
ERYTHROCYTE [DISTWIDTH] IN BLOOD BY AUTOMATED COUNT: 12.4 % (ref 11.5–14.5)
EST. GFR  (AFRICAN AMERICAN): 50.4 ML/MIN/1.73 M^2
EST. GFR  (NON AFRICAN AMERICAN): 43.7 ML/MIN/1.73 M^2
GLUCOSE SERPL-MCNC: 135 MG/DL (ref 70–110)
GLUCOSE SERPL-MCNC: 144 MG/DL (ref 70–110)
GLUCOSE SERPL-MCNC: 250 MG/DL (ref 70–110)
HCT VFR BLD AUTO: 40.2 % (ref 37–48.5)
HGB BLD-MCNC: 13.5 G/DL (ref 12–16)
IMM GRANULOCYTES # BLD AUTO: 0.02 K/UL (ref 0–0.04)
IMM GRANULOCYTES NFR BLD AUTO: 0.3 % (ref 0–0.5)
LYMPHOCYTES # BLD AUTO: 1.8 K/UL (ref 1–4.8)
LYMPHOCYTES NFR BLD: 28.5 % (ref 18–48)
MCH RBC QN AUTO: 34.1 PG (ref 27–31)
MCHC RBC AUTO-ENTMCNC: 33.6 G/DL (ref 32–36)
MCV RBC AUTO: 102 FL (ref 82–98)
MONOCYTES # BLD AUTO: 0.8 K/UL (ref 0.3–1)
MONOCYTES NFR BLD: 12.5 % (ref 4–15)
NEUTROPHILS # BLD AUTO: 3.4 K/UL (ref 1.8–7.7)
NEUTROPHILS NFR BLD: 55.3 % (ref 38–73)
NRBC BLD-RTO: 0 /100 WBC
PLATELET # BLD AUTO: 207 K/UL (ref 150–350)
PMV BLD AUTO: 9.4 FL (ref 9.2–12.9)
POTASSIUM SERPL-SCNC: 3.9 MMOL/L (ref 3.5–5.1)
RBC # BLD AUTO: 3.96 M/UL (ref 4–5.4)
SODIUM SERPL-SCNC: 140 MMOL/L (ref 136–145)
WBC # BLD AUTO: 6.18 K/UL (ref 3.9–12.7)

## 2019-12-12 PROCEDURE — 82962 GLUCOSE BLOOD TEST: CPT

## 2019-12-12 PROCEDURE — 25000003 PHARM REV CODE 250: Performed by: INTERNAL MEDICINE

## 2019-12-12 PROCEDURE — 36415 COLL VENOUS BLD VENIPUNCTURE: CPT

## 2019-12-12 PROCEDURE — 85025 COMPLETE CBC W/AUTO DIFF WBC: CPT

## 2019-12-12 PROCEDURE — 80048 BASIC METABOLIC PNL TOTAL CA: CPT

## 2019-12-12 PROCEDURE — 63600175 PHARM REV CODE 636 W HCPCS: Performed by: INTERNAL MEDICINE

## 2019-12-12 RX ADMIN — LISINOPRIL 5 MG: 5 TABLET ORAL at 08:12

## 2019-12-12 RX ADMIN — INSULIN ASPART 4 UNITS: 100 INJECTION, SOLUTION INTRAVENOUS; SUBCUTANEOUS at 11:12

## 2019-12-12 RX ADMIN — LEVOTHYROXINE SODIUM 25 MCG: 25 TABLET ORAL at 08:12

## 2019-12-12 RX ADMIN — ENOXAPARIN SODIUM 40 MG: 100 INJECTION SUBCUTANEOUS at 08:12

## 2019-12-12 RX ADMIN — GABAPENTIN 300 MG: 300 CAPSULE ORAL at 08:12

## 2019-12-12 RX ADMIN — MELOXICAM 7.5 MG: 7.5 TABLET ORAL at 08:12

## 2019-12-12 RX ADMIN — CILOSTAZOL 50 MG: 50 TABLET ORAL at 08:12

## 2019-12-12 RX ADMIN — METOPROLOL SUCCINATE 50 MG: 50 TABLET, EXTENDED RELEASE ORAL at 08:12

## 2019-12-12 RX ADMIN — CLOPIDOGREL BISULFATE 75 MG: 75 TABLET, FILM COATED ORAL at 08:12

## 2019-12-12 RX ADMIN — ASPIRIN 81 MG: 81 TABLET, DELAYED RELEASE ORAL at 08:12

## 2019-12-12 RX ADMIN — ATORVASTATIN CALCIUM 40 MG: 40 TABLET, FILM COATED ORAL at 08:12

## 2019-12-12 NOTE — PLAN OF CARE
Nursing Transfer Note      12/11/2019     Transfer To: 3018    Transfer via stretcher    Transfer with cardiac monitoring    Transported by Janay DUVALL    Chart send with patient: Yes    Notified: No family present at this time.    Upon arrival to floor: cardiac monitor applied, patient oriented to room, call bell in reach and bed in lowest position

## 2019-12-12 NOTE — HOSPITAL COURSE
79 year old female presented to ED complaining of blue left foot. She has known PVD, and has been studied in the past. She states that the foot is not painful. Personally discussed patient with ED physician, who stated that he had contacted the patient's Vascular Surgeon: he will take the patient to the cath lab in AM. Full Lovenox until then. The patient continues to smoke cigarettes. Discussed smoking cessation at length with patient. No CP/SOB.   12/11 Patient went to cath lab  12/12 stable for discharge with outpatient follow-up. No intervention needed. Discussed smoking cessation at length with patient.  VSS  Lungs: decreased entry without adventitious sounds  Heart: S1S2  Abdo: obese

## 2019-12-12 NOTE — PLAN OF CARE
Plan of care reviewed with the patient. Cardiac, vital signs, and lab monitoring.  Increase activity as tolerated. Bed alarm on. Watch for falls. Accuchecks per order. Strict I&O. Daily weights. Discharge today.

## 2019-12-12 NOTE — PLAN OF CARE
12/12/19 1334   Discharge Assessment   Assessment Type Discharge Planning Assessment     [facility] called this Cm this am and updated status that prvious procedure done last evening had no intervention per MD note, they asked if serviceman should be sent home for need, and explained that this CM could not make such a decision, and gave forwarding number to call unit for hospitalist to speak with them.  CM will follow for dc planning needs.

## 2019-12-12 NOTE — DISCHARGE SUMMARY
[facility] Medicine  Discharge Summary      Patient Name: Mary Cynthia Cryer  MRN: [id number]  Admission Date: 12/10/2019  Hospital Length of Stay: 1 days  Discharge Date and Time:  12/12/2019 11:08 AM  Attending Physician: Kurtis Younger MD   Discharging Provider: Kurtis Younger MD  Primary Care Provider: Hollie Morales NP      HPI:   79 year old female presented to ED complaining of blue left foot. She has known PVD, and has been studied in the past. She states that the foot is not painful. Personally discussed patient with ED physician, who stated that he had contacted the patient's Vascular Surgeon: he will take the patient to the cath lab in AM. Full Lovenox until then. The patient continues to smoke cigarettes. Discussed smoking cessation at length with patient. No CP/SOB.     Procedure(s) (LRB):  Angiogram Extremity Unilateral (N/A)      Hospital Course:   79 year old female presented to ED complaining of blue left foot. She has known PVD, and has been studied in the past. She states that the foot is not painful. Personally discussed patient with ED physician, who stated that he had contacted the patient's Vascular Surgeon: he will take the patient to the cath lab in AM. Full Lovenox until then. The patient continues to smoke cigarettes. Discussed smoking cessation at length with patient. No CP/SOB.   12/11 Patient went to cath lab  12/12 stable for discharge with outpatient follow-up. No intervention needed. Discussed smoking cessation at length with patient.  VSS  Lungs: decreased entry without adventitious sounds  Heart: S1S2  Abdo: obese     Consults:   Consults (From admission, onward)        Status Ordering Provider     Inpatient consult to Hospitalist  Once     Provider:  Kurtis Younger MD    Acknowledged JIAN FERRER     Inpatient consult to Vascular Surgery  Once     Provider:  Cruz Armas MD    Completed KURTIS YOUNGER          No new Assessment & Plan notes  have been filed under this hospital service since the last note was generated.  Service: Hospital Medicine    Final Active Diagnoses:    Diagnosis Date Noted POA    PVD (peripheral vascular disease) [I73.9] 08/21/2017 Unknown      Problems Resolved During this Admission:    Diagnosis Date Noted Date Resolved POA    PRINCIPAL PROBLEM:  Ischemic pain of left foot [M79.672, I99.9] 12/10/2019 12/12/2019 Yes       Discharged Condition: good    Disposition: Home or Self Care    Follow Up:  Follow-up Information     Hollie Morales NP In 1 week.    Specialty:  Family Medicine  Contact information:  [address]  [place] [address]  [phone]                 Patient Instructions:      Diet Cardiac     Activity as tolerated       Significant Diagnostic Studies: Labs:   BMP:   Recent Labs   Lab 12/10/19  1404 12/11/19  0433 12/12/19  0346    95 135*    144 140   K 4.0 3.7 3.9    107 105   CO2 30* 29 30*   BUN 17 15 19   CREATININE 1.1 1.0 1.2   CALCIUM 9.4 8.9 9.1   MG 2.1  --   --     and CBC   Recent Labs   Lab 12/10/19  1404 12/12/19  0346   WBC 5.76 6.18   HGB 14.2 13.5   HCT 41.7 40.2    207       Pending Diagnostic Studies:     None         Medications:  Reconciled Home Medications:      Medication List      CONTINUE taking these medications    albuterol 90 mcg/actuation inhaler  Commonly known as:  PROVENTIL/VENTOLIN HFA  Inhale 2 puffs into the lungs every 6 (six) hours as needed.     aspirin 81 MG EC tablet  Commonly known as:  ECOTRIN  Take 81 mg by mouth once daily.     cilostazol 100 MG Tab  Commonly known as:  PLETAL  Take 50 mg by mouth 2 (two) times daily.     clopidogrel 75 mg tablet  Commonly known as:  PLAVIX  Take 75 mg by mouth once daily.     fish oil-omega-3 fatty acids 300-1,000 mg capsule  Take 2 g by mouth once daily.     insulin glargine 100 unit/mL injection  Commonly known as:  LANTUS  Inject 70 Units into the skin once daily.     insulin syringe-needle U-100 0.3 mL 30  "gauge x 5/16" Syrg  by Other route.     levothyroxine 25 MCG tablet  Commonly known as:  SYNTHROID  Take 25 mcg by mouth once daily.     lisinopril 5 MG tablet  Commonly known as:  PRINIVIL,ZESTRIL  Take 5 mg by mouth once daily.     meloxicam 7.5 MG tablet  Commonly known as:  MOBIC  Take 7.5 mg by mouth once daily.     metFORMIN 500 MG tablet  Commonly known as:  GLUCOPHAGE  Take 500 mg by mouth 2 (two) times daily with meals.     metoprolol succinate 50 MG 24 hr tablet  Commonly known as:  TOPROL-XL  Take 50 mg by mouth 2 (two) times daily. 25 mg + 50 mg = 75 mg BID     metoprolol tartrate 50 MG tablet  Commonly known as:  LOPRESSOR  Take 25 mg by mouth 2 (two) times daily. 25 mg+50 mg=75 mg BID     Neurontin 300 MG capsule  Generic drug:  gabapentin  Take 300 mg by mouth 2 (two) times daily.     NovoLIN R Regular U-100 Insuln 100 unit/mL injection  Generic drug:  insulin regular  Inject 30 Units into the skin 2 (two) times daily before meals.     potassium chloride 8 MEQ Tbsr  Commonly known as:  KLOR-CON  Take 8 mEq by mouth once daily.     rosuvastatin 40 MG Tab  Commonly known as:  CRESTOR  Take 40 mg by mouth.     triamterene-hydrochlorothiazide 37.5-25 mg 37.5-25 mg per tablet  Commonly known as:  MAXZIDE-25  Take 1 tablet by mouth once daily.            Indwelling Lines/Drains at time of discharge:   Lines/Drains/Airways     None                 Time spent on the discharge of patient: 32 minutes  Patient was seen and examined on the date of discharge and determined to be suitable for discharge.         Pepe Younger MD  Department of Hospital Medicine  [facility]  "

## 2020-06-03 ENCOUNTER — OFFICE VISIT (OUTPATIENT)
Dept: PODIATRY | Facility: CLINIC | Age: 80
End: 2020-06-03
Payer: MEDICARE

## 2020-06-03 VITALS
BODY MASS INDEX: 40.32 KG/M2 | HEART RATE: 84 BPM | TEMPERATURE: 98 F | DIASTOLIC BLOOD PRESSURE: 74 MMHG | HEIGHT: 68 IN | SYSTOLIC BLOOD PRESSURE: 131 MMHG | WEIGHT: 266 LBS

## 2020-06-03 DIAGNOSIS — M20.42 HAMMERTOE, BILATERAL: ICD-10-CM

## 2020-06-03 DIAGNOSIS — M20.41 HAMMERTOE, BILATERAL: ICD-10-CM

## 2020-06-03 DIAGNOSIS — I73.9 PVD (PERIPHERAL VASCULAR DISEASE) WITH CLAUDICATION: Primary | ICD-10-CM

## 2020-06-03 DIAGNOSIS — E11.49 TYPE II DIABETES MELLITUS WITH NEUROLOGICAL MANIFESTATIONS: ICD-10-CM

## 2020-06-03 DIAGNOSIS — L60.2 OG (ONYCHOGRYPHOSIS): ICD-10-CM

## 2020-06-03 DIAGNOSIS — R26.2 DIFFICULTY IN WALKING, NOT ELSEWHERE CLASSIFIED: ICD-10-CM

## 2020-06-03 PROCEDURE — 11721 DEBRIDE NAIL 6 OR MORE: CPT | Mod: Q8,S$GLB,, | Performed by: PODIATRIST

## 2020-06-03 PROCEDURE — 11721 ROUTINE FOOT CARE: ICD-10-PCS | Mod: Q8,S$GLB,, | Performed by: PODIATRIST

## 2020-06-03 RX ORDER — METOPROLOL TARTRATE 25 MG/1
TABLET, FILM COATED ORAL
COMMUNITY
Start: 2020-05-11 | End: 2020-09-16

## 2020-06-03 RX ORDER — EMPAGLIFLOZIN AND LINAGLIPTIN 10; 5 MG/1; MG/1
TABLET, FILM COATED ORAL
COMMUNITY
Start: 2020-06-01 | End: 2022-04-20

## 2020-06-03 RX ORDER — LISINOPRIL 5 MG/1
5 TABLET ORAL DAILY
COMMUNITY
Start: 2020-05-20 | End: 2022-04-20

## 2020-06-03 RX ORDER — GLUCOSAM/CHON-MSM1/C/MANG/BOSW 500-416.6
TABLET ORAL
COMMUNITY
Start: 2020-05-20

## 2020-06-03 RX ORDER — AMLODIPINE BESYLATE 5 MG/1
5 TABLET ORAL
Status: ON HOLD | COMMUNITY
Start: 2020-06-01 | End: 2022-07-24 | Stop reason: SDUPTHER

## 2020-06-03 RX ORDER — CALCIUM CITRATE/VITAMIN D3 200MG-6.25
TABLET ORAL
COMMUNITY
Start: 2020-05-20

## 2020-06-03 RX ORDER — MUPIROCIN 20 MG/G
OINTMENT TOPICAL
COMMUNITY
Start: 2020-03-30 | End: 2022-04-20

## 2020-06-03 NOTE — PROGRESS NOTES
[address]  [place] [address]  Phone: [phone]   [phone]    Patient's PCP:Hollie Morales NP  Referring Provider: No ref. provider found    Subjective:      Chief Complaint:: Nail Problem (Q8)    HPI  Mary Cynthia Cryer is a 79 y.o. female who presents with complaints of long, thick toenails of  both feet.  Gradual onset, worsening over past several weeks, aggravated by increased weight bearing, shoe gear, pressure.  Periodic debridement helps symptoms.   This patient has documented high risk feet requiring routine maintenance secondary to diabetes mellitis and those secondary complications of diabetes, as mentioned.      Systemic Doctor: Hollie Morales NP  Date Last Seen:5-20  Blood Sugar: 167  Hemoglobin A1c: 8.1    Vitals:    06/03/20 1405   BP: 131/74   Pulse: 84   Temp: 98 °F (36.7 °C)     Shoe Size:     Past Surgical History:   Procedure Laterality Date    ANGIOGRAPHY OF LOWER EXTREMITY N/A 12/11/2019    Procedure: Angiogram Extremity Unilateral;  Surgeon: Cruz Armas MD;  Location: [facility] CATH/EP LAB;  Service: Cardiology;  Laterality: N/A;    CORONARY ARTERY BYPASS GRAFT      HYSTERECTOMY      partial    PERIPHERAL ANGIOGRAPHY  12/11/2019     Past Medical History:   Diagnosis Date    Allergy     Diabetes mellitus     Diabetes mellitus, type 2     Hyperlipidemia     Hypertension     Obesity     Peripheral vascular disease     Thyroid disease     Type 2 diabetes mellitus      Family History   Problem Relation Age of Onset    Alzheimer's disease Mother     Heart disease Father         MI    No Known Problems Sister     Diabetes Brother     No Known Problems Daughter     No Known Problems Son     No Known Problems Sister     No Known Problems Sister     No Known Problems Sister     No Known Problems Sister     Heart disease Brother     No Known Problems Brother     No Known Problems Son     No Known Problems Son         Social History:   Marital  "Status:   Alcohol History:  reports that she does not drink alcohol.  Tobacco History:  reports that she has been smoking. She has a 15.00 pack-year smoking history. She has never used smokeless tobacco.  Drug History:  reports that she does not use drugs.    Review of patient's allergies indicates:   Allergen Reactions    Tetracyclines Swelling       Current Outpatient Medications   Medication Sig Dispense Refill    albuterol (PROVENTIL/VENTOLIN HFA) 90 mcg/actuation inhaler Inhale 2 puffs into the lungs every 6 (six) hours as needed.      amLODIPine (NORVASC) 5 MG tablet       aspirin (ECOTRIN) 81 MG EC tablet Take 81 mg by mouth once daily.       cilostazol (PLETAL) 100 MG Tab Take 50 mg by mouth 2 (two) times daily.      gabapentin (NEURONTIN) 300 MG capsule Take 300 mg by mouth 2 (two) times daily.       GLYXAMBI 10-5 mg Tab       insulin glargine (LANTUS) 100 unit/mL injection Inject 70 Units into the skin once daily.       insulin syringe-needle U-100 0.3 mL 30 gauge x 5/16" Syrg by Other route.      levothyroxine (SYNTHROID) 25 MCG tablet Take 25 mcg by mouth once daily.        lisinopriL 10 MG tablet       meloxicam (MOBIC) 7.5 MG tablet Take 7.5 mg by mouth once daily.       metformin (GLUCOPHAGE) 500 MG tablet Take 500 mg by mouth 2 (two) times daily with meals.      metoprolol tartrate (LOPRESSOR) 25 MG tablet       metoprolol tartrate (LOPRESSOR) 50 MG tablet Take 25 mg by mouth 2 (two) times daily. 25 mg+50 mg=75 mg BID      mupirocin (BACTROBAN) 2 % ointment       NOVOLIN R REGULAR U-100 INSULN 100 unit/mL injection Inject 30 Units into the skin 2 (two) times daily before meals.       omega-3 fatty acids/fish oil (FISH OIL-OMEGA-3 FATTY ACIDS) 300-1,000 mg capsule Take 2 g by mouth once daily.       potassium chloride (KLOR-CON) 8 MEQ TbSR Take 8 mEq by mouth once daily.        rosuvastatin (CRESTOR) 40 MG Tab Take 40 mg by mouth.      triamterene-hydrochlorothiazide " 37.5-25 mg (MAXZIDE-25) 37.5-25 mg per tablet Take 1 tablet by mouth once daily.       TRUE METRIX GLUCOSE TEST STRIP Strp       TRUEPLUS LANCETS 30 gauge Misc       clopidogrel (PLAVIX) 75 mg tablet Take 75 mg by mouth once daily.       metoprolol succinate (TOPROL-XL) 50 MG 24 hr tablet Take 50 mg by mouth 2 (two) times daily. 25 mg + 50 mg = 75 mg BID       No current facility-administered medications for this visit.        Review of Systems      Objective:        Physical Exam:   Foot Exam  Physical Exam  Physical examination: General: Pt. is well-developed, well-nourished, appears stated age, in no acute distress, alert and oriented x 3.     Vascular: Dorsalis pedis pulses are diminished bilaterally and posterior tibial pulses are 1/4 Bilaterally. Toes are cool to touch.     Neurologic: [place]-Radha 5.07 monofilament is decreased bilateral feet. Sharp/dull sensation absent Bilateral feet.     Vibratory sensation absent bilateral     Hammertoes 2,3 bilaterally.  Plantarflexed first metatarsals, bilaterally      Musculoskeletal: adequate joint range of motion without pain, limitation, nor crepitation Bilateral feet and ankle joints. Muscle strength is 5/5 in all groups bilaterally.     Lymphatics: no lymphangitic streaking bilaterally.  Imaging:            Assessment:       1. PVD (peripheral vascular disease) with claudication    2. Type II diabetes mellitus with neurological manifestations    3. Hammertoe, bilateral    4. OG (onychogryphosis)    5. Difficulty in walking, not elsewhere classified      Plan:   PVD (peripheral vascular disease) with claudication    Type II diabetes mellitus with neurological manifestations    Hammertoe, bilateral    OG (onychogryphosis)    Difficulty in walking, not elsewhere classified      No follow-ups on file.    Routine Foot Care  Date/Time: 6/3/2020 2:20 PM  Performed by: Louise Damon DPM  Authorized by: Louise Damon DPM     Time out: Immediately prior to  "procedure a "time out" was called to verify the correct patient, procedure, equipment, support staff and site/side marked as required.    Consent Done?:  Not Needed  Hyperkeratotic Skin Lesions?: No      Nail Care Type:  Debride  Location(s): All  (Left 1st Toe, Left 3rd Toe, Left 2nd Toe, Left 4th Toe, Left 5th Toe, Right 1st Toe, Right 2nd Toe, Right 3rd Toe, Right 4th Toe and Right 5th Toe)  Patient tolerance:  Patient tolerated the procedure well with no immediate complications     Instruments Used: Nail Nipper   Manually reduced with electric .           Counseling:     I provided patient education verbally regarding:   Patient diagnosis, treatment options, as well as alternatives, risks, and benefits.     This note was created using Dragon voice recognition software that occasionally misinterpreted phrases or words.         Counseled patient on the aspects of diabetes and how it pertains to the feet.  I explained the importance of proper diabetic foot care and how it is essential for the health of their feet.      Shoe inspection. Patient instructed on proper foot hygeine. We discussed wearing proper shoe gear, daily foot inspections, never walking without protective shoe gear, never putting sharp instruments to feet, routine podiatric nail visits every 2-3 months.        "

## 2020-06-15 ENCOUNTER — PES CALL (OUTPATIENT)
Dept: ADMINISTRATIVE | Facility: CLINIC | Age: 80
End: 2020-06-15

## 2020-07-10 ENCOUNTER — PES CALL (OUTPATIENT)
Dept: ADMINISTRATIVE | Facility: CLINIC | Age: 80
End: 2020-07-10

## 2020-09-16 ENCOUNTER — OFFICE VISIT (OUTPATIENT)
Dept: PODIATRY | Facility: CLINIC | Age: 80
End: 2020-09-16
Payer: MEDICARE

## 2020-09-16 VITALS — OXYGEN SATURATION: 98 % | HEART RATE: 83 BPM | BODY MASS INDEX: 40.45 KG/M2 | HEIGHT: 68 IN | TEMPERATURE: 97 F

## 2020-09-16 DIAGNOSIS — I73.9 PVD (PERIPHERAL VASCULAR DISEASE) WITH CLAUDICATION: Primary | ICD-10-CM

## 2020-09-16 DIAGNOSIS — E11.49 TYPE II DIABETES MELLITUS WITH NEUROLOGICAL MANIFESTATIONS: ICD-10-CM

## 2020-09-16 DIAGNOSIS — M79.675 TOE PAIN, LEFT: ICD-10-CM

## 2020-09-16 DIAGNOSIS — M79.672 LEFT FOOT PAIN: ICD-10-CM

## 2020-09-16 DIAGNOSIS — M20.41 HAMMERTOE, BILATERAL: ICD-10-CM

## 2020-09-16 DIAGNOSIS — L60.2 OG (ONYCHOGRYPHOSIS): ICD-10-CM

## 2020-09-16 DIAGNOSIS — R26.2 DIFFICULTY IN WALKING, NOT ELSEWHERE CLASSIFIED: ICD-10-CM

## 2020-09-16 DIAGNOSIS — M79.673 ISCHEMIC FOOT PAIN AT REST: ICD-10-CM

## 2020-09-16 DIAGNOSIS — M21.6X2 PLANTARFLEXION DEFORMITY OF LEFT FOOT: ICD-10-CM

## 2020-09-16 DIAGNOSIS — I99.8 ISCHEMIC FOOT PAIN AT REST: ICD-10-CM

## 2020-09-16 DIAGNOSIS — M20.42 HAMMERTOE, BILATERAL: ICD-10-CM

## 2020-09-16 PROCEDURE — 11721 ROUTINE FOOT CARE: ICD-10-PCS | Mod: Q8,S$GLB,, | Performed by: PODIATRIST

## 2020-09-16 PROCEDURE — 11721 DEBRIDE NAIL 6 OR MORE: CPT | Mod: Q8,S$GLB,, | Performed by: PODIATRIST

## 2020-09-16 RX ORDER — SYRINGE AND NEEDLE,INSULIN,1ML 31GX15/64"
SYRINGE, EMPTY DISPOSABLE MISCELLANEOUS
COMMUNITY
Start: 2020-08-13

## 2020-09-16 NOTE — PROGRESS NOTES
[address] [facility] [address] [address]  [place] [address]  Phone: [phone]   [phone]    Patient's PCP:Hollie Morales NP  Referring Provider: No ref. provider found    Subjective:      Chief Complaint:: Nail Care    HPI  Mary Cynthia Cryer is a 80 y.o. female who presents with complaints of long, thick toenails of both feet.  Gradual onset, worsening over past several weeks, aggravated by increased weight bearing, shoe gear, pressure.  Periodic debridement helps symptoms. No pain in toenails.   This patient has documented high risk feet requiring routine maintenance secondary to diabetes mellitis and those secondary complications of diabetes, as mentioned.      Systemic Doctor: Hollie Morales NP  Date Last Seen: 06/11/20  Blood Sugar: 89  Hemoglobin A1c: 8.1 (05/18/20)    Vitals:    09/16/20 1015   Pulse: 83   Temp: 97.4 °F (36.3 °C)     Shoe Size:     Past Surgical History:   Procedure Laterality Date    ANGIOGRAPHY OF LOWER EXTREMITY N/A 12/11/2019    Procedure: Angiogram Extremity Unilateral;  Surgeon: Cruz Armas MD;  Location: [facility] CATH/EP LAB;  Service: Cardiology;  Laterality: N/A;    CORONARY ARTERY BYPASS GRAFT      HYSTERECTOMY      partial    PERIPHERAL ANGIOGRAPHY  12/11/2019     Past Medical History:   Diagnosis Date    Allergy     Diabetes mellitus     Diabetes mellitus, type 2     Hyperlipidemia     Hypertension     Obesity     Peripheral vascular disease     Thyroid disease     Type 2 diabetes mellitus      Family History   Problem Relation Age of Onset    Alzheimer's disease Mother     Heart disease Father         MI    No Known Problems Sister     Diabetes Brother     No Known Problems Daughter     No Known Problems Son     No Known Problems Sister     No Known Problems Sister     No Known Problems Sister     No Known Problems Sister     Heart disease Brother     No Known Problems Brother     No Known Problems Son     No Known Problems Son         Social History:  "  Marital Status:   Alcohol History:  reports no history of alcohol use.  Tobacco History:  reports that she has been smoking. She has a 15.00 pack-year smoking history. She has never used smokeless tobacco.  Drug History:  reports no history of drug use.    Review of patient's allergies indicates:   Allergen Reactions    Tetracyclines Swelling       Current Outpatient Medications   Medication Sig Dispense Refill    albuterol (PROVENTIL/VENTOLIN HFA) 90 mcg/actuation inhaler Inhale 2 puffs into the lungs every 6 (six) hours as needed.      amLODIPine (NORVASC) 5 MG tablet       aspirin (ECOTRIN) 81 MG EC tablet Take 81 mg by mouth once daily.       cilostazol (PLETAL) 100 MG Tab Take 50 mg by mouth 2 (two) times daily.      clopidogrel (PLAVIX) 75 mg tablet Take 75 mg by mouth once daily.       DROPLET INSULIN SYRINGE 1 mL 31 gauge x 15/64" Syrg       gabapentin (NEURONTIN) 300 MG capsule Take 300 mg by mouth 2 (two) times daily.       GLYXAMBI 10-5 mg Tab       insulin glargine (LANTUS) 100 unit/mL injection Inject 70 Units into the skin once daily.       insulin syringe-needle U-100 0.3 mL 30 gauge x 5/16" Syrg by Other route.      levothyroxine (SYNTHROID) 25 MCG tablet Take 25 mcg by mouth once daily.        lisinopriL 10 MG tablet       meloxicam (MOBIC) 7.5 MG tablet Take 7.5 mg by mouth once daily.       metformin (GLUCOPHAGE) 500 MG tablet Take 500 mg by mouth 2 (two) times daily with meals.      metoprolol succinate (TOPROL-XL) 50 MG 24 hr tablet Take 50 mg by mouth 2 (two) times daily. 25 mg + 50 mg = 75 mg BID      metoprolol tartrate (LOPRESSOR) 50 MG tablet Take 25 mg by mouth 2 (two) times daily. 25 mg+50 mg=75 mg BID      mupirocin (BACTROBAN) 2 % ointment       NOVOLIN R REGULAR U-100 INSULN 100 unit/mL injection Inject 30 Units into the skin 2 (two) times daily before meals.       omega-3 fatty acids/fish oil (FISH OIL-OMEGA-3 FATTY ACIDS) 300-1,000 mg capsule Take 2 g by " mouth once daily.       potassium chloride (KLOR-CON) 8 MEQ TbSR Take 8 mEq by mouth once daily.        rosuvastatin (CRESTOR) 40 MG Tab Take 40 mg by mouth.      triamterene-hydrochlorothiazide 37.5-25 mg (MAXZIDE-25) 37.5-25 mg per tablet Take 1 tablet by mouth once daily.       TRUE METRIX GLUCOSE TEST STRIP Strp       TRUEPLUS LANCETS 30 gauge Misc        No current facility-administered medications for this visit.        Review of Systems      Objective:        Physical Exam:   Foot Exam  Physical Exam  Physical examination: General: Pt. is well-developed, well-nourished, appears stated age, in no acute distress, alert and oriented x 3.     Vascular: Dorsalis pedis pulses are diminished bilaterally and posterior tibial pulses are 1/4 Bilaterally. Toes are cool to touch.     Neurologic: [place]-Radha 5.07 monofilament is decreased bilateral feet. Sharp/dull sensation absent Bilateral feet.     Vibratory sensation absent bilateral     Hammertoes 2,3 bilaterally.  Plantarflexed first metatarsals, bilaterally      Musculoskeletal: adequate joint range of motion without pain, limitation, nor crepitation Bilateral feet and ankle joints. Muscle strength is 5/5 in all groups bilaterally.     Lymphatics: no lymphangitic streaking bilaterally.    Imaging:            Assessment:       1. PVD (peripheral vascular disease) with claudication    2. Ischemic foot pain at rest - Left Foot    3. Type II diabetes mellitus with neurological manifestations    4. Toe pain, left    5. Hammertoe, bilateral    6. Plantarflexion deformity of left foot    7. OG (onychogryphosis)    8. Left foot pain    9. Difficulty in walking, not elsewhere classified      Plan:   PVD (peripheral vascular disease) with claudication    Ischemic foot pain at rest - Left Foot    Type II diabetes mellitus with neurological manifestations    Toe pain, left    Hammertoe, bilateral    Plantarflexion deformity of left foot    OG  "(onychogryphosis)    Left foot pain    Difficulty in walking, not elsewhere classified    Other orders  -     Routine Foot Care      No follow-ups on file.    Routine Foot Care    Date/Time: 9/16/2020 10:00 AM  Performed by: Louise Damon DPM  Authorized by: Louise Damon DPM     Time out: Immediately prior to procedure a "time out" was called to verify the correct patient, procedure, equipment, support staff and site/side marked as required.    Consent Done?:  Not Needed  Hyperkeratotic Skin Lesions?: No      Nail Care Type:  Debride  Location(s): All  (Left 1st Toe, Left 3rd Toe, Left 2nd Toe, Left 4th Toe, Left 5th Toe, Right 1st Toe, Right 2nd Toe, Right 3rd Toe, Right 4th Toe and Right 5th Toe)  Patient tolerance:  Patient tolerated the procedure well with no immediate complications     Instruments Used: Nail Nipper   Manually reduced with electric .        - None    Counseling:     I provided patient education verbally regarding:   Patient diagnosis, treatment options, as well as alternatives, risks, and benefits.     This note was created using Dragon voice recognition software that occasionally misinterpreted phrases or words.        Counseled patient on the aspects of diabetes and how it pertains to the feet.  I explained the importance of proper diabetic foot care and how it is essential for the health of their feet.        Shoe inspection. Patient instructed on proper foot hygeine. We discussed wearing proper shoe gear, daily foot inspections, never walking without protective shoe gear, never putting sharp instruments to feet, routine podiatric nail visits every 2-3 months.                "

## 2020-09-22 ENCOUNTER — PES CALL (OUTPATIENT)
Dept: ADMINISTRATIVE | Facility: CLINIC | Age: 80
End: 2020-09-22

## 2020-12-16 ENCOUNTER — OFFICE VISIT (OUTPATIENT)
Dept: PODIATRY | Facility: CLINIC | Age: 80
End: 2020-12-16
Payer: MEDICARE

## 2020-12-16 VITALS — HEIGHT: 68 IN | RESPIRATION RATE: 16 BRPM | BODY MASS INDEX: 40.32 KG/M2 | HEART RATE: 84 BPM | WEIGHT: 266 LBS

## 2020-12-16 DIAGNOSIS — M21.6X2 PLANTARFLEXION DEFORMITY OF LEFT FOOT: ICD-10-CM

## 2020-12-16 DIAGNOSIS — R60.0 BILATERAL LOWER EXTREMITY EDEMA: ICD-10-CM

## 2020-12-16 DIAGNOSIS — B35.3 TINEA PEDIS OF BOTH FEET: ICD-10-CM

## 2020-12-16 DIAGNOSIS — E11.49 TYPE II DIABETES MELLITUS WITH NEUROLOGICAL MANIFESTATIONS: ICD-10-CM

## 2020-12-16 DIAGNOSIS — R26.2 DIFFICULTY IN WALKING, NOT ELSEWHERE CLASSIFIED: ICD-10-CM

## 2020-12-16 DIAGNOSIS — M20.41 HAMMERTOE, BILATERAL: ICD-10-CM

## 2020-12-16 DIAGNOSIS — I73.9 PVD (PERIPHERAL VASCULAR DISEASE): ICD-10-CM

## 2020-12-16 DIAGNOSIS — M20.42 HAMMERTOE, BILATERAL: ICD-10-CM

## 2020-12-16 DIAGNOSIS — I73.9 PVD (PERIPHERAL VASCULAR DISEASE) WITH CLAUDICATION: Primary | ICD-10-CM

## 2020-12-16 DIAGNOSIS — L60.2 ONYCHOGRYPHOSIS: ICD-10-CM

## 2020-12-16 DIAGNOSIS — M79.674 PAIN IN TOES OF BOTH FEET: ICD-10-CM

## 2020-12-16 DIAGNOSIS — M79.675 PAIN IN TOES OF BOTH FEET: ICD-10-CM

## 2020-12-16 DIAGNOSIS — R26.2 DIFFICULTY WALKING: ICD-10-CM

## 2020-12-16 PROCEDURE — 99213 OFFICE O/P EST LOW 20 MIN: CPT | Mod: 25,S$GLB,, | Performed by: PODIATRIST

## 2020-12-16 PROCEDURE — 11721 DEBRIDE NAIL 6 OR MORE: CPT | Mod: Q8,S$GLB,, | Performed by: PODIATRIST

## 2020-12-16 PROCEDURE — 3288F FALL RISK ASSESSMENT DOCD: CPT | Mod: CPTII,S$GLB,, | Performed by: PODIATRIST

## 2020-12-16 PROCEDURE — 11721 ROUTINE FOOT CARE: ICD-10-PCS | Mod: Q8,S$GLB,, | Performed by: PODIATRIST

## 2020-12-16 PROCEDURE — 1159F MED LIST DOCD IN RCRD: CPT | Mod: S$GLB,,, | Performed by: PODIATRIST

## 2020-12-16 PROCEDURE — 3288F PR FALLS RISK ASSESSMENT DOCUMENTED: ICD-10-PCS | Mod: CPTII,S$GLB,, | Performed by: PODIATRIST

## 2020-12-16 PROCEDURE — 1101F PT FALLS ASSESS-DOCD LE1/YR: CPT | Mod: CPTII,S$GLB,, | Performed by: PODIATRIST

## 2020-12-16 PROCEDURE — 99213 PR OFFICE/OUTPT VISIT, EST, LEVL III, 20-29 MIN: ICD-10-PCS | Mod: 25,S$GLB,, | Performed by: PODIATRIST

## 2020-12-16 PROCEDURE — 1159F PR MEDICATION LIST DOCUMENTED IN MEDICAL RECORD: ICD-10-PCS | Mod: S$GLB,,, | Performed by: PODIATRIST

## 2020-12-16 PROCEDURE — 1101F PR PT FALLS ASSESS DOC 0-1 FALLS W/OUT INJ PAST YR: ICD-10-PCS | Mod: CPTII,S$GLB,, | Performed by: PODIATRIST

## 2020-12-16 NOTE — PROGRESS NOTES
"  [address] [facility] [address]  [place] [address]  Phone: [phone]   [phone]    Patient's PCP:Hollie Morales NP  Referring Provider: No ref. provider found    Subjective:      Chief Complaint:: Nail Care (routine diabetic nail trimming q8)    HPI  Mary Cynthia Cryer is a 80 y.o. female who presents with complaints of long, thick toenails of both feet.  Gradual onset, worsening over past several weeks, aggravated by increased weight bearing, shoe gear, pressure.  Periodic debridement helps symptoms. No pain in toenails.   This patient has documented high risk feet requiring routine maintenance secondary to diabetes mellitis and those secondary complications of diabetes, as mentioned.    Additionally, patient complains today of dry scaly skin bilaterally.      Systemic Doctor: Hollie Morales NP  Date Last Seen: 06/11/20  Blood Sugar: 111  Hemoglobin A1c: 8.1 (05/18/20)    Vitals:    12/16/20 1425   Pulse: 84   Resp: 16   Weight: 120.7 kg (266 lb)   Height: 5' 8" (1.727 m)      Shoe Size:     Past Surgical History:   Procedure Laterality Date    ANGIOGRAPHY OF LOWER EXTREMITY N/A 12/11/2019    Procedure: Angiogram Extremity Unilateral;  Surgeon: Cruz Armas MD;  Location: [facility] CATH/EP LAB;  Service: Cardiology;  Laterality: N/A;    CORONARY ARTERY BYPASS GRAFT      HYSTERECTOMY      partial    PERIPHERAL ANGIOGRAPHY  12/11/2019     Past Medical History:   Diagnosis Date    Allergy     Diabetes mellitus     Diabetes mellitus, type 2     Hyperlipidemia     Hypertension     Obesity     Peripheral vascular disease     Thyroid disease     Type 2 diabetes mellitus      Family History   Problem Relation Age of Onset    Alzheimer's disease Mother     Heart disease Father         MI    No Known Problems Sister     Diabetes Brother     No Known Problems Daughter     No Known Problems Son     No Known Problems Sister     No Known Problems Sister     No Known Problems Sister     No Known " "Problems Sister     Heart disease Brother     No Known Problems Brother     No Known Problems Son     No Known Problems Son         Social History:   Marital Status:   Alcohol History:  reports no history of alcohol use.  Tobacco History:  reports that she has been smoking. She has a 15.00 pack-year smoking history. She has never used smokeless tobacco.  Drug History:  reports no history of drug use.    Review of patient's allergies indicates:   Allergen Reactions    Tetracyclines Swelling       Current Outpatient Medications   Medication Sig Dispense Refill    albuterol (PROVENTIL/VENTOLIN HFA) 90 mcg/actuation inhaler Inhale 2 puffs into the lungs every 6 (six) hours as needed.      amLODIPine (NORVASC) 5 MG tablet       aspirin (ECOTRIN) 81 MG EC tablet Take 81 mg by mouth once daily.       cilostazol (PLETAL) 100 MG Tab Take 50 mg by mouth 2 (two) times daily.      clopidogrel (PLAVIX) 75 mg tablet Take 75 mg by mouth once daily.       DROPLET INSULIN SYRINGE 1 mL 31 gauge x 15/64" Syrg       gabapentin (NEURONTIN) 300 MG capsule Take 300 mg by mouth 2 (two) times daily.       GLYXAMBI 10-5 mg Tab       insulin glargine (LANTUS) 100 unit/mL injection Inject 70 Units into the skin once daily.       insulin syringe-needle U-100 0.3 mL 30 gauge x 5/16" Syrg by Other route.      levothyroxine (SYNTHROID) 25 MCG tablet Take 25 mcg by mouth once daily.        lisinopriL 10 MG tablet       meloxicam (MOBIC) 7.5 MG tablet Take 7.5 mg by mouth once daily.       metformin (GLUCOPHAGE) 500 MG tablet Take 500 mg by mouth 2 (two) times daily with meals.      metoprolol succinate (TOPROL-XL) 50 MG 24 hr tablet Take 50 mg by mouth 2 (two) times daily. 25 mg + 50 mg = 75 mg BID      metoprolol tartrate (LOPRESSOR) 50 MG tablet Take 25 mg by mouth 2 (two) times daily. 25 mg+50 mg=75 mg BID      mupirocin (BACTROBAN) 2 % ointment       NOVOLIN R REGULAR U-100 INSULN 100 unit/mL injection Inject 30 " Units into the skin 2 (two) times daily before meals.       omega-3 fatty acids/fish oil (FISH OIL-OMEGA-3 FATTY ACIDS) 300-1,000 mg capsule Take 2 g by mouth once daily.       potassium chloride (KLOR-CON) 8 MEQ TbSR Take 8 mEq by mouth once daily.        rosuvastatin (CRESTOR) 40 MG Tab Take 40 mg by mouth.      triamterene-hydrochlorothiazide 37.5-25 mg (MAXZIDE-25) 37.5-25 mg per tablet Take 1 tablet by mouth once daily.       TRUE METRIX GLUCOSE TEST STRIP Strp       TRUEPLUS LANCETS 30 gauge Misc        No current facility-administered medications for this visit.        Review of Systems      Objective:        Physical Exam:   Foot Exam  Physical Exam   Physical examination: General: Pt. is well-developed, well-nourished, appears stated age, in no acute distress, alert and oriented x 3.     Vascular: Dorsalis pedis pulses are diminished bilaterally and posterior tibial pulses are 1/4 Bilaterally. Toes are cool to touch.     Neurologic: Hamilton-Radha 5.07 monofilament is decreased bilateral feet. Sharp/dull sensation absent Bilateral feet.     Vibratory sensation absent bilateral     Hammertoes 2,3 bilaterally.  Plantarflexed first metatarsals, bilaterally      Musculoskeletal: adequate joint range of motion without pain, limitation, nor crepitation Bilateral feet and ankle joints. Muscle strength is 5/5 in all groups bilaterally.     Lymphatics: no lymphangitic streaking bilaterally.        Dry scale with superficial flakes over an erythematous base bilateral feet in moccasin distribution without ulceration, drainage, pus, tracking, fluctuance, malodor, or cardinal signs infection.    Toenails 1st, 2nd, 3rd, 4th, 5th  bilateral are hypertrophic, dystrophic, discolored tanish brown with tan, gray crumbly subungual debris.  Tender to distal nail plate pressure, without periungual skin abnormality of each.      Imaging:            Assessment:       1. PVD (peripheral vascular disease) with claudication   "  2. Type II diabetes mellitus with neurological manifestations    3. Hammertoe, bilateral    4. Plantarflexion deformity of left foot    5. PVD (peripheral vascular disease)    6. Uncontrolled type 2 diabetes mellitus with circulatory disorder, with long-term current use of insulin    7. Pain in toes of both feet    8. Difficulty walking    9. Tinea pedis of both feet    10. Onychogryphosis    11. Difficulty in walking, not elsewhere classified    12. Bilateral lower extremity edema      Plan:   PVD (peripheral vascular disease) with claudication    Type II diabetes mellitus with neurological manifestations    Hammertoe, bilateral    Plantarflexion deformity of left foot    PVD (peripheral vascular disease)    Uncontrolled type 2 diabetes mellitus with circulatory disorder, with long-term current use of insulin    Pain in toes of both feet    Difficulty walking    Tinea pedis of both feet    Onychogryphosis    Difficulty in walking, not elsewhere classified    Bilateral lower extremity edema    Other orders  -     Routine Foot Care      No follow-ups on file.    Routine Foot Care    Date/Time: 12/16/2020 2:40 PM  Performed by: Louise Damon DPM  Authorized by: Louise Damon DPM     Time out: Immediately prior to procedure a "time out" was called to verify the correct patient, procedure, equipment, support staff and site/side marked as required.    Consent Done?:  Not Needed  Hyperkeratotic Skin Lesions?: No      Nail Care Type:  Debride  Location(s): All  (Left 1st Toe, Left 3rd Toe, Left 2nd Toe, Left 4th Toe, Left 5th Toe, Right 1st Toe, Right 2nd Toe, Right 3rd Toe, Right 4th Toe and Right 5th Toe)  Patient tolerance:  Patient tolerated the procedure well with no immediate complications     Instruments Used: Nail Nipper   Manually reduced with electric .         No notes on file       Counseling:     I provided patient education verbally regarding:   Patient diagnosis, treatment options, as well as " alternatives, risks, and benefits.     This note was created using Dragon voice recognition software that occasionally misinterpreted phrases or words.       Prescribed [facility] antifungal foot cream and antifungal nail solution

## 2021-01-11 DIAGNOSIS — Z12.31 SCREENING MAMMOGRAM FOR HIGH-RISK PATIENT: Primary | ICD-10-CM

## 2021-03-17 ENCOUNTER — OFFICE VISIT (OUTPATIENT)
Dept: PODIATRY | Facility: CLINIC | Age: 81
End: 2021-03-17
Payer: MEDICARE

## 2021-03-17 VITALS
WEIGHT: 266 LBS | DIASTOLIC BLOOD PRESSURE: 73 MMHG | BODY MASS INDEX: 40.32 KG/M2 | SYSTOLIC BLOOD PRESSURE: 128 MMHG | OXYGEN SATURATION: 97 % | HEIGHT: 68 IN | RESPIRATION RATE: 18 BRPM | HEART RATE: 85 BPM

## 2021-03-17 DIAGNOSIS — I73.9 PVD (PERIPHERAL VASCULAR DISEASE) WITH CLAUDICATION: Primary | ICD-10-CM

## 2021-03-17 DIAGNOSIS — R26.2 DIFFICULTY WALKING: ICD-10-CM

## 2021-03-17 DIAGNOSIS — L60.2 OG (ONYCHOGRYPHOSIS): ICD-10-CM

## 2021-03-17 DIAGNOSIS — M20.42 HAMMERTOE, BILATERAL: ICD-10-CM

## 2021-03-17 DIAGNOSIS — I73.9 PVD (PERIPHERAL VASCULAR DISEASE): ICD-10-CM

## 2021-03-17 DIAGNOSIS — M20.41 HAMMERTOE, BILATERAL: ICD-10-CM

## 2021-03-17 DIAGNOSIS — M21.6X2 PLANTARFLEXION DEFORMITY OF LEFT FOOT: ICD-10-CM

## 2021-03-17 DIAGNOSIS — R60.0 BILATERAL LOWER EXTREMITY EDEMA: ICD-10-CM

## 2021-03-17 DIAGNOSIS — E11.49 TYPE II DIABETES MELLITUS WITH NEUROLOGICAL MANIFESTATIONS: ICD-10-CM

## 2021-03-17 DIAGNOSIS — R26.2 DIFFICULTY IN WALKING, NOT ELSEWHERE CLASSIFIED: ICD-10-CM

## 2021-03-17 PROCEDURE — 1101F PR PT FALLS ASSESS DOC 0-1 FALLS W/OUT INJ PAST YR: ICD-10-PCS | Mod: CPTII,S$GLB,, | Performed by: PODIATRIST

## 2021-03-17 PROCEDURE — 99499 NO LOS: ICD-10-PCS | Mod: S$GLB,,, | Performed by: PODIATRIST

## 2021-03-17 PROCEDURE — 3288F FALL RISK ASSESSMENT DOCD: CPT | Mod: CPTII,S$GLB,, | Performed by: PODIATRIST

## 2021-03-17 PROCEDURE — 3288F PR FALLS RISK ASSESSMENT DOCUMENTED: ICD-10-PCS | Mod: CPTII,S$GLB,, | Performed by: PODIATRIST

## 2021-03-17 PROCEDURE — 1126F AMNT PAIN NOTED NONE PRSNT: CPT | Mod: S$GLB,,, | Performed by: PODIATRIST

## 2021-03-17 PROCEDURE — 11721 ROUTINE FOOT CARE: ICD-10-PCS | Mod: Q8,S$GLB,, | Performed by: PODIATRIST

## 2021-03-17 PROCEDURE — 99499 UNLISTED E&M SERVICE: CPT | Mod: S$GLB,,, | Performed by: PODIATRIST

## 2021-03-17 PROCEDURE — 11721 DEBRIDE NAIL 6 OR MORE: CPT | Mod: Q8,S$GLB,, | Performed by: PODIATRIST

## 2021-03-17 PROCEDURE — 1126F PR PAIN SEVERITY QUANTIFIED, NO PAIN PRESENT: ICD-10-PCS | Mod: S$GLB,,, | Performed by: PODIATRIST

## 2021-03-17 PROCEDURE — 1101F PT FALLS ASSESS-DOCD LE1/YR: CPT | Mod: CPTII,S$GLB,, | Performed by: PODIATRIST

## 2021-03-17 RX ORDER — STREPTOMYCIN 1 G/1
INJECTION, POWDER, LYOPHILIZED, FOR SOLUTION INTRAMUSCULAR
COMMUNITY
Start: 2021-01-05 | End: 2022-04-20

## 2021-03-17 RX ORDER — ISOPROPYL ALCOHOL 70 ML/100ML
SWAB TOPICAL
Status: ON HOLD | COMMUNITY
Start: 2021-02-03 | End: 2023-06-26 | Stop reason: HOSPADM

## 2021-03-17 RX ORDER — ISOPROPYL ALCOHOL 0.75 G/1
SWAB TOPICAL
Status: ON HOLD | COMMUNITY
Start: 2021-02-04 | End: 2023-06-26 | Stop reason: HOSPADM

## 2021-06-30 ENCOUNTER — OFFICE VISIT (OUTPATIENT)
Dept: PODIATRY | Facility: CLINIC | Age: 81
End: 2021-06-30
Payer: MEDICARE

## 2021-06-30 VITALS
HEIGHT: 68 IN | OXYGEN SATURATION: 96 % | RESPIRATION RATE: 18 BRPM | DIASTOLIC BLOOD PRESSURE: 76 MMHG | WEIGHT: 272 LBS | SYSTOLIC BLOOD PRESSURE: 126 MMHG | HEART RATE: 81 BPM | BODY MASS INDEX: 41.22 KG/M2

## 2021-06-30 DIAGNOSIS — E11.49 TYPE II DIABETES MELLITUS WITH NEUROLOGICAL MANIFESTATIONS: ICD-10-CM

## 2021-06-30 DIAGNOSIS — M20.42 HAMMERTOE, BILATERAL: ICD-10-CM

## 2021-06-30 DIAGNOSIS — R60.0 BILATERAL LOWER EXTREMITY EDEMA: ICD-10-CM

## 2021-06-30 DIAGNOSIS — R26.2 DIFFICULTY WALKING: ICD-10-CM

## 2021-06-30 DIAGNOSIS — M20.41 HAMMERTOE, BILATERAL: ICD-10-CM

## 2021-06-30 DIAGNOSIS — M21.6X2 PLANTARFLEXION DEFORMITY OF LEFT FOOT: ICD-10-CM

## 2021-06-30 DIAGNOSIS — I73.9 PVD (PERIPHERAL VASCULAR DISEASE) WITH CLAUDICATION: Primary | ICD-10-CM

## 2021-06-30 DIAGNOSIS — L60.2 OG (ONYCHOGRYPHOSIS): ICD-10-CM

## 2021-06-30 PROCEDURE — 11721 DEBRIDE NAIL 6 OR MORE: CPT | Mod: S$GLB,,, | Performed by: PODIATRIST

## 2021-06-30 PROCEDURE — 99213 PR OFFICE/OUTPT VISIT, EST, LEVL III, 20-29 MIN: ICD-10-PCS | Mod: 25,S$GLB,, | Performed by: PODIATRIST

## 2021-06-30 PROCEDURE — 1125F PR PAIN SEVERITY QUANTIFIED, PAIN PRESENT: ICD-10-PCS | Mod: S$GLB,,, | Performed by: PODIATRIST

## 2021-06-30 PROCEDURE — 1125F AMNT PAIN NOTED PAIN PRSNT: CPT | Mod: S$GLB,,, | Performed by: PODIATRIST

## 2021-06-30 PROCEDURE — 3288F FALL RISK ASSESSMENT DOCD: CPT | Mod: CPTII,S$GLB,, | Performed by: PODIATRIST

## 2021-06-30 PROCEDURE — 1159F PR MEDICATION LIST DOCUMENTED IN MEDICAL RECORD: ICD-10-PCS | Mod: S$GLB,,, | Performed by: PODIATRIST

## 2021-06-30 PROCEDURE — 3288F PR FALLS RISK ASSESSMENT DOCUMENTED: ICD-10-PCS | Mod: CPTII,S$GLB,, | Performed by: PODIATRIST

## 2021-06-30 PROCEDURE — 99213 OFFICE O/P EST LOW 20 MIN: CPT | Mod: 25,S$GLB,, | Performed by: PODIATRIST

## 2021-06-30 PROCEDURE — 11721 ROUTINE FOOT CARE: ICD-10-PCS | Mod: S$GLB,,, | Performed by: PODIATRIST

## 2021-06-30 PROCEDURE — 1101F PT FALLS ASSESS-DOCD LE1/YR: CPT | Mod: CPTII,S$GLB,, | Performed by: PODIATRIST

## 2021-06-30 PROCEDURE — 1101F PR PT FALLS ASSESS DOC 0-1 FALLS W/OUT INJ PAST YR: ICD-10-PCS | Mod: CPTII,S$GLB,, | Performed by: PODIATRIST

## 2021-06-30 PROCEDURE — 1159F MED LIST DOCD IN RCRD: CPT | Mod: S$GLB,,, | Performed by: PODIATRIST

## 2021-10-20 ENCOUNTER — OFFICE VISIT (OUTPATIENT)
Dept: PODIATRY | Facility: CLINIC | Age: 81
End: 2021-10-20
Payer: MEDICARE

## 2021-10-20 VITALS
BODY MASS INDEX: 41.36 KG/M2 | DIASTOLIC BLOOD PRESSURE: 71 MMHG | HEART RATE: 86 BPM | SYSTOLIC BLOOD PRESSURE: 116 MMHG | HEIGHT: 68 IN

## 2021-10-20 DIAGNOSIS — R26.2 DIFFICULTY WALKING: ICD-10-CM

## 2021-10-20 DIAGNOSIS — L08.9 LEG ABRASION, INFECTED, LEFT, INITIAL ENCOUNTER: ICD-10-CM

## 2021-10-20 DIAGNOSIS — L60.2 OG (ONYCHOGRYPHOSIS): ICD-10-CM

## 2021-10-20 DIAGNOSIS — M20.41 HAMMERTOE, BILATERAL: ICD-10-CM

## 2021-10-20 DIAGNOSIS — E11.49 TYPE II DIABETES MELLITUS WITH NEUROLOGICAL MANIFESTATIONS: Primary | ICD-10-CM

## 2021-10-20 DIAGNOSIS — I73.9 PVD (PERIPHERAL VASCULAR DISEASE): ICD-10-CM

## 2021-10-20 DIAGNOSIS — I73.9 PVD (PERIPHERAL VASCULAR DISEASE) WITH CLAUDICATION: ICD-10-CM

## 2021-10-20 DIAGNOSIS — R26.2 DIFFICULTY IN WALKING, NOT ELSEWHERE CLASSIFIED: ICD-10-CM

## 2021-10-20 DIAGNOSIS — M20.42 HAMMERTOE, BILATERAL: ICD-10-CM

## 2021-10-20 DIAGNOSIS — S80.812A LEG ABRASION, INFECTED, LEFT, INITIAL ENCOUNTER: ICD-10-CM

## 2021-10-20 DIAGNOSIS — R60.0 BILATERAL LOWER EXTREMITY EDEMA: ICD-10-CM

## 2021-10-20 PROCEDURE — 1101F PT FALLS ASSESS-DOCD LE1/YR: CPT | Mod: CPTII,S$GLB,, | Performed by: PODIATRIST

## 2021-10-20 PROCEDURE — 1126F AMNT PAIN NOTED NONE PRSNT: CPT | Mod: CPTII,S$GLB,, | Performed by: PODIATRIST

## 2021-10-20 PROCEDURE — 1159F PR MEDICATION LIST DOCUMENTED IN MEDICAL RECORD: ICD-10-PCS | Mod: CPTII,S$GLB,, | Performed by: PODIATRIST

## 2021-10-20 PROCEDURE — 3074F PR MOST RECENT SYSTOLIC BLOOD PRESSURE < 130 MM HG: ICD-10-PCS | Mod: CPTII,S$GLB,, | Performed by: PODIATRIST

## 2021-10-20 PROCEDURE — 11721 ROUTINE FOOT CARE: ICD-10-PCS | Mod: Q8,S$GLB,, | Performed by: PODIATRIST

## 2021-10-20 PROCEDURE — 3078F PR MOST RECENT DIASTOLIC BLOOD PRESSURE < 80 MM HG: ICD-10-PCS | Mod: CPTII,S$GLB,, | Performed by: PODIATRIST

## 2021-10-20 PROCEDURE — 1101F PR PT FALLS ASSESS DOC 0-1 FALLS W/OUT INJ PAST YR: ICD-10-PCS | Mod: CPTII,S$GLB,, | Performed by: PODIATRIST

## 2021-10-20 PROCEDURE — 99214 OFFICE O/P EST MOD 30 MIN: CPT | Mod: 25,S$GLB,, | Performed by: PODIATRIST

## 2021-10-20 PROCEDURE — 3074F SYST BP LT 130 MM HG: CPT | Mod: CPTII,S$GLB,, | Performed by: PODIATRIST

## 2021-10-20 PROCEDURE — 1126F PR PAIN SEVERITY QUANTIFIED, NO PAIN PRESENT: ICD-10-PCS | Mod: CPTII,S$GLB,, | Performed by: PODIATRIST

## 2021-10-20 PROCEDURE — 3078F DIAST BP <80 MM HG: CPT | Mod: CPTII,S$GLB,, | Performed by: PODIATRIST

## 2021-10-20 PROCEDURE — 3288F PR FALLS RISK ASSESSMENT DOCUMENTED: ICD-10-PCS | Mod: CPTII,S$GLB,, | Performed by: PODIATRIST

## 2021-10-20 PROCEDURE — 1159F MED LIST DOCD IN RCRD: CPT | Mod: CPTII,S$GLB,, | Performed by: PODIATRIST

## 2021-10-20 PROCEDURE — 1160F RVW MEDS BY RX/DR IN RCRD: CPT | Mod: CPTII,S$GLB,, | Performed by: PODIATRIST

## 2021-10-20 PROCEDURE — 99214 PR OFFICE/OUTPT VISIT, EST, LEVL IV, 30-39 MIN: ICD-10-PCS | Mod: 25,S$GLB,, | Performed by: PODIATRIST

## 2021-10-20 PROCEDURE — 1160F PR REVIEW ALL MEDS BY PRESCRIBER/CLIN PHARMACIST DOCUMENTED: ICD-10-PCS | Mod: CPTII,S$GLB,, | Performed by: PODIATRIST

## 2021-10-20 PROCEDURE — 11721 DEBRIDE NAIL 6 OR MORE: CPT | Mod: Q8,S$GLB,, | Performed by: PODIATRIST

## 2021-10-20 PROCEDURE — 3288F FALL RISK ASSESSMENT DOCD: CPT | Mod: CPTII,S$GLB,, | Performed by: PODIATRIST

## 2022-01-28 DIAGNOSIS — I70.213 ATHEROSCLEROSIS OF NATIVE ARTERIES OF EXTREMITIES WITH INTERMITTENT CLAUDICATION, BILATERAL LEGS: Primary | ICD-10-CM

## 2022-02-03 ENCOUNTER — HOSPITAL ENCOUNTER (OUTPATIENT)
Dept: RADIOLOGY | Facility: HOSPITAL | Age: 82
Discharge: HOME | End: 2022-02-03
Attending: SURGERY
Payer: MEDICARE

## 2022-02-03 DIAGNOSIS — I70.213 ATHEROSCLEROSIS OF NATIVE ARTERIES OF EXTREMITIES WITH INTERMITTENT CLAUDICATION, BILATERAL LEGS: ICD-10-CM

## 2022-02-03 LAB
CREAT SERPL-MCNC: 1.3 MG/DL (ref 0.5–1.4)
SAMPLE: NORMAL

## 2022-02-03 PROCEDURE — 25500020 PHARM REV CODE 255: Mod: PO | Performed by: SURGERY

## 2022-02-03 PROCEDURE — 82565 ASSAY OF CREATININE: CPT | Mod: PO

## 2022-02-03 RX ADMIN — IOHEXOL 120 ML: 350 INJECTION, SOLUTION INTRAVENOUS at 02:02

## 2022-04-19 NOTE — PATIENT INSTRUCTIONS
Your Diabetes Foot Care Program    Every day you depend on your feet to keep you moving. But when you have diabetes, your feet need special care. Even a small foot problem can become very serious. So dont take your feet for granted. By working with your diabetes healthcare team, you can learn how to protect your feet and keep them healthy.  Evaluating your feet  An evaluation helps your healthcare provider check the condition of your feet. The evaluation includes a review of your diabetes history and overall health. It may also include a foot exam, X-rays, or other tests. These can help show problems beneath the skin that you cant see or feel.  Medical history  You will be asked about your overall health and any history of foot problems. Youll also discuss your diabetes history, such as whether your blood sugar level has changed over time. It also includes questions about sensations of pain, tingling, pins and needles, or numbness. Your healthcare provider will also want to know if you have high blood pressure and heart disease, or if you smoke. Be sure to mention any medicines (including over-the-counter), supplements, or herbal remedies you take.  Foot exam  A foot exam checks the condition of different parts of your foot. First, your skin and nails are examined for any signs of infection. Blood flow is checked by feeling for the pulses in each foot. You may also have tests to study the nerves in the foot. These include using a small filament (wire) to see how sensitive your feet are. In certain cases, you will be asked to walk a short distance to check for bone, joint, and muscle problems.  Diagnostic tests  If needed, your healthcare provider will suggest certain tests to learn more about your feet. These include:  Doppler tests to measure blood flow in the feet and lower leg.  X-rays, which can show bone or joint problems.  Other imaging tests, such as an MRI (magnetic resonance imaging), bone scan, and CT  (computed tomography) scan. These can help show bone infections.  Other tests, such as vascular tests, which study the blood flow in your feet and legs. You may also have nerve studies to learn how sensitive your feet are.  Creating a foot care program  Based on the evaluation, your healthcare provider will create a foot care program for you. Your program may be as simple as starting a daily self-care routine and changing the types of shoes your wear. It may also involve treating minor foot problems, such as a corn or blister. In some cases, surgery will be needed to treat an infection or mechanical problems, such as hammer toes.  Preventing problems  When you have diabetes, its easier to prevent problems than to treat them later on. So see your healthcare team for regular checkups and foot care. Your healthcare team can also help you learn more about caring for your feet at home. For example, you may be told to avoid walking barefoot. Or you may be told that special footwear is needed to protect your feet.  Have regular checkups  Foot problems can develop quickly. So be sure to follow your healthcare teams schedule for regular checkups. During office visits, take off your shoes and socks as soon as you get in the exam room. Ask your healthcare provider to examine your feet for problems. This will make it easier to find and treat small skin irritations before they get worse. Regular checkups can also help keep track of the blood flow and feeling in your feet. If you have neuropathy (lack of feeling in your feet), you will need to have checkups more often.  Learn about self-care  The more you know about diabetes and your feet, the easier it will be to prevent problems. Members of your healthcare team can teach you how to inspect your feet and teach you to look for warning signs. They can also give you other foot care tips. During office visits, be sure to ask any questions you have.  Date Last Reviewed: 7/1/2016  ©  4185-5555 [employer]. [address] [place] [address]. All rights reserved. This information is not intended as a substitute for professional medical care. Always follow your healthcare professional's instructions.       What Are Mallet, Hammer, and Claw Toes?  Mallet, hammer, and claw toes are most often caused by wearing shoes that are too short or heels that are too high. This jams the toes against the front of the shoe and causes one or more joints to bend. Rarely, disease can cause the joints in the toes to bend. Mallet, hammer, and claw toes are among the most common toe problems. They occur most often in the longest of the four smaller toes.      Inside your toes  There are 3 bones in each of your 4 smaller toes. Where 2 bones connect is called a joint. Normally the toes lie flat. But pressure on the toes or the front of the foot can cause one or more joints to bend. This curls the toe. Toes that stay curled are called mallet toes, hammer toes, or claw toes, depending on which joints are bent.    Symptoms  You may feel pain in the toe or in the ball of your foot. A corn (a hard growth of skin on the top of the toe) may form where the toe rubs against the top of the shoe. Or a callus (a hard growth of skin on the bottom of the foot) may form under the tip of the toe or on the ball of the foot. Corns and calluses can also be painful.    Date Last Reviewed: 10/18/2015  © 2275-7269 [employer]. [address] [place] [id number]. All rights reserved. This information is not intended as a substitute for professional medical care. Always follow your healthcare professional's instructions.

## 2022-04-20 ENCOUNTER — OFFICE VISIT (OUTPATIENT)
Dept: PODIATRY | Facility: CLINIC | Age: 82
End: 2022-04-20
Payer: MEDICARE

## 2022-04-20 VITALS — OXYGEN SATURATION: 95 % | HEART RATE: 72 BPM | BODY MASS INDEX: 42.13 KG/M2 | WEIGHT: 278 LBS | HEIGHT: 68 IN

## 2022-04-20 DIAGNOSIS — R26.2 DIFFICULTY IN WALKING, NOT ELSEWHERE CLASSIFIED: ICD-10-CM

## 2022-04-20 DIAGNOSIS — E11.49 TYPE II DIABETES MELLITUS WITH NEUROLOGICAL MANIFESTATIONS: Primary | ICD-10-CM

## 2022-04-20 DIAGNOSIS — E11.9 COMPREHENSIVE DIABETIC FOOT EXAMINATION, TYPE 2 DM, ENCOUNTER FOR: ICD-10-CM

## 2022-04-20 DIAGNOSIS — I73.9 PVD (PERIPHERAL VASCULAR DISEASE): ICD-10-CM

## 2022-04-20 DIAGNOSIS — M20.41 HAMMERTOE, BILATERAL: ICD-10-CM

## 2022-04-20 DIAGNOSIS — M20.42 HAMMERTOE, BILATERAL: ICD-10-CM

## 2022-04-20 DIAGNOSIS — B35.3 TINEA PEDIS OF BOTH FEET: ICD-10-CM

## 2022-04-20 DIAGNOSIS — L60.2 ONYCHOGRYPHOSIS: ICD-10-CM

## 2022-04-20 DIAGNOSIS — L60.2 OG (ONYCHOGRYPHOSIS): ICD-10-CM

## 2022-04-20 DIAGNOSIS — R26.2 DIFFICULTY WALKING: ICD-10-CM

## 2022-04-20 DIAGNOSIS — I73.9 PVD (PERIPHERAL VASCULAR DISEASE) WITH CLAUDICATION: ICD-10-CM

## 2022-04-20 DIAGNOSIS — R60.0 BILATERAL LOWER EXTREMITY EDEMA: ICD-10-CM

## 2022-04-20 PROCEDURE — 1101F PR PT FALLS ASSESS DOC 0-1 FALLS W/OUT INJ PAST YR: ICD-10-PCS | Mod: CPTII,S$GLB,, | Performed by: PODIATRIST

## 2022-04-20 PROCEDURE — 11721 DEBRIDE NAIL 6 OR MORE: CPT | Mod: Q8,S$GLB,, | Performed by: PODIATRIST

## 2022-04-20 PROCEDURE — 99214 OFFICE O/P EST MOD 30 MIN: CPT | Mod: 25,S$GLB,, | Performed by: PODIATRIST

## 2022-04-20 PROCEDURE — 1160F RVW MEDS BY RX/DR IN RCRD: CPT | Mod: CPTII,S$GLB,, | Performed by: PODIATRIST

## 2022-04-20 PROCEDURE — 11721 ROUTINE FOOT CARE: ICD-10-PCS | Mod: Q8,S$GLB,, | Performed by: PODIATRIST

## 2022-04-20 PROCEDURE — 1101F PT FALLS ASSESS-DOCD LE1/YR: CPT | Mod: CPTII,S$GLB,, | Performed by: PODIATRIST

## 2022-04-20 PROCEDURE — 1159F MED LIST DOCD IN RCRD: CPT | Mod: CPTII,S$GLB,, | Performed by: PODIATRIST

## 2022-04-20 PROCEDURE — 3288F FALL RISK ASSESSMENT DOCD: CPT | Mod: CPTII,S$GLB,, | Performed by: PODIATRIST

## 2022-04-20 PROCEDURE — 1160F PR REVIEW ALL MEDS BY PRESCRIBER/CLIN PHARMACIST DOCUMENTED: ICD-10-PCS | Mod: CPTII,S$GLB,, | Performed by: PODIATRIST

## 2022-04-20 PROCEDURE — 99214 PR OFFICE/OUTPT VISIT, EST, LEVL IV, 30-39 MIN: ICD-10-PCS | Mod: 25,S$GLB,, | Performed by: PODIATRIST

## 2022-04-20 PROCEDURE — 3288F PR FALLS RISK ASSESSMENT DOCUMENTED: ICD-10-PCS | Mod: CPTII,S$GLB,, | Performed by: PODIATRIST

## 2022-04-20 PROCEDURE — 1159F PR MEDICATION LIST DOCUMENTED IN MEDICAL RECORD: ICD-10-PCS | Mod: CPTII,S$GLB,, | Performed by: PODIATRIST

## 2022-04-20 RX ORDER — LISINOPRIL 10 MG/1
10 TABLET ORAL DAILY
Status: ON HOLD | COMMUNITY
Start: 2022-01-18 | End: 2022-07-24 | Stop reason: SDUPTHER

## 2022-04-20 NOTE — PROGRESS NOTES
"  [address]  [place] [address]  Phone: [phone]   [phone]    Patient's PCP:Hollie Morales NP  Referring Provider: No ref. provider found    Subjective:      Chief Complaint:: Routine Foot Care, Diabetes Mellitus, and Diabetic Foot Exam    HPI  Mary Cynthia Cryer is a 81 y.o. female who presents for routine high risk nail trimming secondary to diabetics  with a complaint of long, thick toenails of both feet.  Gradual onset, worsening over past several weeks, aggravated by increased weight bearing, shoe gear, pressure.  Periodic debridement helps symptoms. No pain in toenails.     Additionally, patient presents for diabetic foot exam      Systemic Doctor: Dr. Veto Diaz MD  Date Last Seen: 03/02/2022  Blood Sugar: 73  Hemoglobin A1c: 6.5 (03/07/2022)      Vitals:    04/20/22 1409   Pulse: 72   SpO2: 95%   Weight: 126.1 kg (278 lb)   Height: 5' 8" (1.727 m)      Shoe Size:     Past Surgical History:   Procedure Laterality Date    ANGIOGRAPHY OF LOWER EXTREMITY N/A 12/11/2019    Procedure: Angiogram Extremity Unilateral;  Surgeon: Cruz Armas MD;  Location: [facility] CATH/EP LAB;  Service: Cardiology;  Laterality: N/A;    CORONARY ARTERY BYPASS GRAFT      HYSTERECTOMY      partial    PERIPHERAL ANGIOGRAPHY  12/11/2019     Past Medical History:   Diagnosis Date    Allergy     Diabetes mellitus     Diabetes mellitus, type 2     Hyperlipidemia     Hypertension     Obesity     Peripheral vascular disease     Thyroid disease     Type 2 diabetes mellitus      Family History   Problem Relation Age of Onset    Alzheimer's disease Mother     Heart disease Father         MI    No Known Problems Sister     Diabetes Brother     No Known Problems Daughter     No Known Problems Son     No Known Problems Sister     No Known Problems Sister     No Known Problems Sister     No Known Problems Sister     Heart disease Brother     No Known Problems Brother     No Known " "Problems Son     No Known Problems Son         Social History:   Marital Status:   Alcohol History:  reports no history of alcohol use.  Tobacco History:  reports that she has been smoking. She has a 15.00 pack-year smoking history. She has never used smokeless tobacco.  Drug History:  reports no history of drug use.    Review of patient's allergies indicates:   Allergen Reactions    Tetracyclines Swelling       Current Outpatient Medications   Medication Sig Dispense Refill    albuterol (PROVENTIL/VENTOLIN HFA) 90 mcg/actuation inhaler Inhale 2 puffs into the lungs every 6 (six) hours as needed.      alcohol swabs PadM USE THREE TIMES DAILY  TO  CHECK BLOOD SUGAR      amLODIPine (NORVASC) 5 MG tablet Take 5 mg by mouth.      aspirin (ECOTRIN) 81 MG EC tablet Take 81 mg by mouth once daily.       BD ALCOHOL SWABS PadM       cilostazol (PLETAL) 100 MG Tab Take 100 mg by mouth 2 (two) times daily.      DROPLET INSULIN SYRINGE 1 mL 31 gauge x 15/64" Syrg       gabapentin (NEURONTIN) 300 MG capsule Take 300 mg by mouth 2 (two) times daily.       insulin glargine (LANTUS) 100 unit/mL injection Inject 70 Units into the skin once daily.       insulin syringe-needle U-100 0.3 mL 30 gauge x 5/16" Syrg by Other route.      levothyroxine (SYNTHROID) 25 MCG tablet Take 25 mcg by mouth once daily.      lisinopriL 10 MG tablet Take 10 mg by mouth once daily.      meloxicam (MOBIC) 7.5 MG tablet Take 7.5 mg by mouth once daily.       metformin (GLUCOPHAGE) 500 MG tablet Take 500 mg by mouth 2 (two) times daily with meals.      metoprolol succinate (TOPROL-XL) 50 MG 24 hr tablet Take 50 mg by mouth 2 (two) times daily. 25 mg + 50 mg = 75 mg BID      metoprolol tartrate (LOPRESSOR) 50 MG tablet Take 25 mg by mouth 2 (two) times daily. 25 mg+50 mg=75 mg BID      NOVOLIN R REGULAR U-100 INSULN 100 unit/mL injection Inject 30 Units into the skin 2 (two) times daily before meals.       potassium chloride " (KLOR-CON) 8 MEQ TbSR Take 8 mEq by mouth once daily.      rosuvastatin (CRESTOR) 40 MG Tab Take 40 mg by mouth once daily.      triamterene-hydrochlorothiazide 37.5-25 mg (MAXZIDE-25) 37.5-25 mg per tablet Take 1 tablet by mouth once daily.       TRUE METRIX GLUCOSE TEST STRIP Strp       TRUEPLUS LANCETS 30 gauge Misc        No current facility-administered medications for this visit.       Review of Systems   Constitutional: Negative for chills, fatigue, fever and unexpected weight change.   HENT: Negative for hearing loss and trouble swallowing.    Eyes: Negative for photophobia and visual disturbance.   Respiratory: Negative for cough, shortness of breath and wheezing.    Cardiovascular: Negative for chest pain, palpitations and leg swelling.   Gastrointestinal: Negative for abdominal pain and nausea.   Genitourinary: Negative for dysuria and frequency.   Musculoskeletal: Positive for gait problem. Negative for arthralgias, back pain, joint swelling and myalgias.   Skin: Negative for rash and wound.   Neurological: Positive for numbness. Negative for tremors, seizures, weakness and headaches.   Hematological: Bruises/bleeds easily.         Objective:        Physical Exam:   Foot Exam  Physical Exam  Ortho/SPM Exam      Physical examination: General: Pt. is well-developed, well-nourished, appears stated age, in no acute distress, alert and oriented x 3.     Vascular: Dorsalis pedis pulses are diminished bilaterally and posterior tibial pulses are 1/4 Bilaterally. Toes are cool to touch.     Neurologic: Nichols-Radha 5.07 monofilament is decreased bilateral feet. Sharp/dull sensation absent Bilateral feet.     Vibratory sensation absent bilateral     Hammertoes 2,3 bilaterally.  Plantarflexed first metatarsals, bilaterally      Musculoskeletal: adequate joint range of motion without pain, limitation, nor crepitation Bilateral feet and ankle joints. Muscle strength is 5/5 in all groups  "bilaterally.     Lymphatics: no lymphangitic streaking bilaterally.         Dry scale with superficial flakes over an erythematous base bilateral feet in moccasin distribution without ulceration, drainage, pus, tracking, fluctuance, malodor, or cardinal signs infection.    Toenails 1st, 2nd, 3rd, 4th, 5th  bilateral are hypertrophic, dystrophic, discolored tanish brown with tan, gray crumbly subungual debris.  Tender to distal nail plate pressure, without periungual skin abnormality of each.  Imaging:            Assessment:       1. Type II diabetes mellitus with neurological manifestations    2. PVD (peripheral vascular disease) with claudication    3. PVD (peripheral vascular disease)    4. Hammertoe, bilateral    5. Bilateral lower extremity edema    6. OG (onychogryphosis)    7. Difficulty in walking, not elsewhere classified    8. Difficulty walking    9. Uncontrolled type 2 diabetes mellitus with circulatory disorder, with long-term current use of insulin    10. Onychogryphosis    11. Tinea pedis of both feet    12. Comprehensive diabetic foot examination, type 2 DM, encounter for      Plan:   Type II diabetes mellitus with neurological manifestations  -      DIABETES FOOT EXAM    PVD (peripheral vascular disease) with claudication    PVD (peripheral vascular disease)    Hammertoe, bilateral    Bilateral lower extremity edema    OG (onychogryphosis)    Difficulty in walking, not elsewhere classified    Difficulty walking    Uncontrolled type 2 diabetes mellitus with circulatory disorder, with long-term current use of insulin    Onychogryphosis    Tinea pedis of both feet    Comprehensive diabetic foot examination, type 2 DM, encounter for  -      DIABETES FOOT EXAM    Other orders  -     Routine Foot Care      No follow-ups on file.    Routine Foot Care    Date/Time: 4/20/2022 2:00 PM  Performed by: Louise Damon DPM  Authorized by: Louise Damon DPM     Time out: Immediately prior to procedure a "time " "out" was called to verify the correct patient, procedure, equipment, support staff and site/side marked as required.    Consent Done?:  Not Needed  Hyperkeratotic Skin Lesions?: No      Nail Care Type:  Debride  Location(s): All  (Left 1st Toe, Left 3rd Toe, Left 2nd Toe, Left 4th Toe, Left 5th Toe, Right 1st Toe, Right 2nd Toe, Right 3rd Toe, Right 4th Toe and Right 5th Toe)  Patient tolerance:  Patient tolerated the procedure well with no immediate complications     Instruments Used: Nail Nipper   Manually reduced with electric .                 Counseling:     I provided patient education verbally regarding:   Patient diagnosis, treatment options, as well as alternatives, risks, and benefits.     This note was created using Dragon voice recognition software that occasionally misinterpreted phrases or words.     Patient should call the office immediately if any signs of infection, such as fever, chills, sweats, increased redness or pain.    Patient was instructed to call the clinic or go to the emergency department if their symptoms do not improve, worsens, or if new symptoms develop.  Patient was advised that if any increased swelling, pain, or numbness arise to go immediately to the ED. Patient knows to call any time if an emergency arises. Shared decision making occurred and patient verbalized understanding in agreement with this plan.       Counseling/Education:  I provided patient education verbally regarding:   The aspects of diabetes and how it pertains to the feet. I explained the importance of proper diabetic foot care and how it is essential for the health of their feet.    I discussed the importance of knowing their Hemoglobin A1c and that the level needs to be as close to 6 as possible. I discussed the increase complications of high blood sugar including stroke, blindness, heart attack, kidney failure and loss of limb secondary to neuropathy and PVD.     With neuropathy, beware of any breaks in " the skin or redness. These areas are not recognized early due to the numbness.    I discussed Diabetes, lower back issues, metabolic disorders, systemic causes, chemotherapy, vitamin deficiency, heavy metal exposure, as some of the causes. I also explained that as much as 40% of the time we can not find a cause. I discussed different treatments available to control the symptoms but which may not cure the problem.       Counseled patient on the aspects of diabetes and how it pertains to the feet.  I explained the importance of proper diabetic foot care and how it is essential for the health of their feet.      Shoe inspection. Patient instructed on proper foot hygeine. We discussed wearing proper shoe gear, daily foot inspections, never walking without protective shoe gear, never putting sharp instruments to feet, routine podiatric nail visits every 2-3 months.      Additional patient instructions:     - Check feet daily for wounds and areas of irritation.     - Keep regularly scheduled appointments with primary care, ophthalmology, and podiatry.     - Maintain blood glucose control via taking medications as prescribed, diabetic diet, and exercise.     - Apply moisturizing cream to feet and ankles daily but not between toes.     - Keep feet clean and dry, especially between toes.     - Elevate feet as much as possible throughout the day.     - Wear supportive/accommodative shoes at all times when ambulating.     - Supplement with alpha lipoic acid (600 mg once daily by mouth) and vitamin B complex (as directed per package insert) to reduce and repair nerve damage.     - Wear over-the-counter compression socks at all times when ambulating.  Do not wear them while resting for prolonged periods of time such as when sleeping.     - Notify clinic immediately of any new or worsening conditions.          I counseled the patient on their conditions, their implications and medical management.     >50% of this > 60 minute  visit was spent face to face educating/counseling the patient    I spent a total of 60 minutes on the day of the visit.This includes face to face time and non-face to face time preparing to see the patient (eg, review of tests), obtaining and/or reviewing separately obtained history, documenting clinical information in the electronic or other health record, independently interpreting results and communicating results to the patient/family/caregiver, or care coordinator.

## 2022-07-20 PROBLEM — T14.8XXA FRACTURE: Status: RESOLVED | Noted: 2022-07-20 | Resolved: 2022-07-20

## 2022-07-20 PROBLEM — S82.51XA: Status: ACTIVE | Noted: 2022-07-20

## 2022-07-20 PROBLEM — T14.8XXA FRACTURE: Status: ACTIVE | Noted: 2022-07-20

## 2022-07-21 PROBLEM — E11.42 DIABETIC POLYNEUROPATHY ASSOCIATED WITH TYPE 2 DIABETES MELLITUS: Status: RESOLVED | Noted: 2017-08-21 | Resolved: 2022-07-21

## 2022-07-21 PROBLEM — I10 HYPERTENSION: Status: ACTIVE | Noted: 2022-07-21

## 2022-07-21 PROBLEM — I15.2 HYPERTENSION ASSOCIATED WITH DIABETES: Status: RESOLVED | Noted: 2017-08-21 | Resolved: 2022-07-21

## 2022-07-21 PROBLEM — E03.9 HYPOTHYROIDISM: Status: RESOLVED | Noted: 2017-08-21 | Resolved: 2022-07-21

## 2022-07-21 PROBLEM — E78.5 HYPERLIPIDEMIA ASSOCIATED WITH TYPE 2 DIABETES MELLITUS: Status: RESOLVED | Noted: 2017-08-21 | Resolved: 2022-07-21

## 2022-07-21 PROBLEM — E11.69 HYPERLIPIDEMIA ASSOCIATED WITH TYPE 2 DIABETES MELLITUS: Status: RESOLVED | Noted: 2017-08-21 | Resolved: 2022-07-21

## 2022-07-21 PROBLEM — E11.59 HYPERTENSION ASSOCIATED WITH DIABETES: Status: RESOLVED | Noted: 2017-08-21 | Resolved: 2022-07-21

## 2022-07-21 PROBLEM — Z71.89 ACP (ADVANCE CARE PLANNING): Status: ACTIVE | Noted: 2022-07-21

## 2022-12-15 PROBLEM — T81.89XA SURGICAL WOUND, NON HEALING: Status: ACTIVE | Noted: 2022-12-15

## 2023-01-18 ENCOUNTER — OFFICE VISIT (OUTPATIENT)
Dept: PODIATRY | Facility: CLINIC | Age: 83
End: 2023-01-18
Payer: MEDICARE

## 2023-01-18 VITALS — BODY MASS INDEX: 40.92 KG/M2 | WEIGHT: 270 LBS | RESPIRATION RATE: 20 BRPM | HEIGHT: 68 IN

## 2023-01-18 DIAGNOSIS — L60.2 OG (ONYCHOGRYPHOSIS): ICD-10-CM

## 2023-01-18 DIAGNOSIS — M20.41 HAMMERTOE, BILATERAL: ICD-10-CM

## 2023-01-18 DIAGNOSIS — R60.0 BILATERAL LOWER EXTREMITY EDEMA: ICD-10-CM

## 2023-01-18 DIAGNOSIS — E11.9 ENCOUNTER FOR DIABETIC FOOT EXAM: ICD-10-CM

## 2023-01-18 DIAGNOSIS — I73.9 PVD (PERIPHERAL VASCULAR DISEASE) WITH CLAUDICATION: ICD-10-CM

## 2023-01-18 DIAGNOSIS — E11.49 TYPE II DIABETES MELLITUS WITH NEUROLOGICAL MANIFESTATIONS: Primary | ICD-10-CM

## 2023-01-18 DIAGNOSIS — L60.2 ONYCHOGRYPHOSIS: ICD-10-CM

## 2023-01-18 DIAGNOSIS — R26.2 DIFFICULTY IN WALKING, NOT ELSEWHERE CLASSIFIED: ICD-10-CM

## 2023-01-18 DIAGNOSIS — B35.3 TINEA PEDIS OF BOTH FEET: ICD-10-CM

## 2023-01-18 DIAGNOSIS — I73.9 PVD (PERIPHERAL VASCULAR DISEASE): ICD-10-CM

## 2023-01-18 DIAGNOSIS — M20.42 HAMMERTOE, BILATERAL: ICD-10-CM

## 2023-01-18 DIAGNOSIS — E11.9 COMPREHENSIVE DIABETIC FOOT EXAMINATION, TYPE 2 DM, ENCOUNTER FOR: ICD-10-CM

## 2023-01-18 DIAGNOSIS — R26.2 DIFFICULTY WALKING: ICD-10-CM

## 2023-01-18 PROCEDURE — 1159F PR MEDICATION LIST DOCUMENTED IN MEDICAL RECORD: ICD-10-PCS | Mod: CPTII,S$GLB,, | Performed by: PODIATRIST

## 2023-01-18 PROCEDURE — 99214 PR OFFICE/OUTPT VISIT, EST, LEVL IV, 30-39 MIN: ICD-10-PCS | Mod: 25,S$GLB,, | Performed by: PODIATRIST

## 2023-01-18 PROCEDURE — 11721 DEBRIDE NAIL 6 OR MORE: CPT | Mod: Q8,S$GLB,, | Performed by: PODIATRIST

## 2023-01-18 PROCEDURE — 1160F RVW MEDS BY RX/DR IN RCRD: CPT | Mod: CPTII,S$GLB,, | Performed by: PODIATRIST

## 2023-01-18 PROCEDURE — 1159F MED LIST DOCD IN RCRD: CPT | Mod: CPTII,S$GLB,, | Performed by: PODIATRIST

## 2023-01-18 PROCEDURE — 1126F AMNT PAIN NOTED NONE PRSNT: CPT | Mod: CPTII,S$GLB,, | Performed by: PODIATRIST

## 2023-01-18 PROCEDURE — 11721 ROUTINE FOOT CARE: ICD-10-PCS | Mod: Q8,S$GLB,, | Performed by: PODIATRIST

## 2023-01-18 PROCEDURE — 1160F PR REVIEW ALL MEDS BY PRESCRIBER/CLIN PHARMACIST DOCUMENTED: ICD-10-PCS | Mod: CPTII,S$GLB,, | Performed by: PODIATRIST

## 2023-01-18 PROCEDURE — 1126F PR PAIN SEVERITY QUANTIFIED, NO PAIN PRESENT: ICD-10-PCS | Mod: CPTII,S$GLB,, | Performed by: PODIATRIST

## 2023-01-18 PROCEDURE — 99214 OFFICE O/P EST MOD 30 MIN: CPT | Mod: 25,S$GLB,, | Performed by: PODIATRIST

## 2023-01-18 RX ORDER — LISINOPRIL 10 MG/1
10 TABLET ORAL DAILY
COMMUNITY
Start: 2022-12-16

## 2023-01-18 RX ORDER — AMLODIPINE BESYLATE 5 MG/1
5 TABLET ORAL DAILY
COMMUNITY
Start: 2022-12-19

## 2023-01-18 NOTE — PROGRESS NOTES
"  [address] [facility] [address]  [place] [address]  Phone: [phone]   [phone]    Patient's PCP:Emmy Piña MD  Referring Provider: No ref. provider found    Subjective:      Chief Complaint:: Nail Care (Routine q8 diabetic nail trimming), Routine Foot Care, Diabetes Mellitus, and Diabetic Foot Exam    HPI  Mary Cynthia Cryer is a 82 y.o. female who presents today with complaints of long, thick toenails and callus both feet.  Gradual onset, worsening over past several weeks, aggravated by increased weight bearing, shoe gear, pressure.  Periodic debridement helps symptoms.    This patient has documented high risk feet requiring routine maintenance secondary to diabetes mellitis and those secondary complications of diabetes, as mentioned.    Additionally, patient presents for diabetic foot exam.      Systemic Doctor: Emmy Piña MD  Date Last Seen: 12/08/2022  Blood Sugar: 222  Hemoglobin A1c: 6.3    Vitals:    01/18/23 1053   Resp: 20   Weight: 122.5 kg (270 lb)   Height: 5' 8" (1.727 m)   PainSc: 0-No pain      Shoe Size:     Past Surgical History:   Procedure Laterality Date    ANGIOGRAPHY OF LOWER EXTREMITY N/A 12/11/2019    Procedure: Angiogram Extremity Unilateral;  Surgeon: Cruz Armas MD;  Location: [facility] CATH/EP LAB;  Service: Cardiology;  Laterality: N/A;    ANKLE HARDWARE REMOVAL Right 12/15/2022    Procedure: REMOVAL, HARDWARE, ANKLE;  Surgeon: Nae Jerry MD;  Location: [facility] OR;  Service: Orthopedics;  Laterality: Right;    CORONARY ARTERY BYPASS GRAFT      HYSTERECTOMY      partial    IRRIGATION AND DEBRIDEMENT OF LOWER EXTREMITY Right 12/15/2022    Procedure: IRRIGATION AND DEBRIDEMENT, LOWER EXTREMITY;  Surgeon: Nae Jerry MD;  Location: [facility] OR;  Service: Orthopedics;  Laterality: Right;    OPEN REDUCTION AND INTERNAL FIXATION (ORIF) OF INJURY OF ANKLE Right 7/20/2022    Procedure: ORIF, ANKLE- medial mallelous;  Surgeon: Nae Jerry MD;  Location: " "[facility] OR;  Service: Orthopedics;  Laterality: Right;    PERIPHERAL ANGIOGRAPHY  12/11/2019     Past Medical History:   Diagnosis Date    Allergy     Anticoagulant long-term use     Diabetes mellitus     Diabetes mellitus, type 2     Disorder of kidney and ureter     Hyperlipidemia     Hypertension     Obesity     Peripheral vascular disease     Thyroid disease     Type 2 diabetes mellitus      Family History   Problem Relation Age of Onset    Alzheimer's disease Mother     Heart disease Father         MI    No Known Problems Sister     Diabetes Brother     No Known Problems Daughter     No Known Problems Son     No Known Problems Sister     No Known Problems Sister     No Known Problems Sister     No Known Problems Sister     Heart disease Brother     No Known Problems Brother     No Known Problems Son     No Known Problems Son         Social History:   Marital Status:   Alcohol History:  reports no history of alcohol use.  Tobacco History:  reports that she has been smoking cigarettes. She has a 15.00 pack-year smoking history. She has never used smokeless tobacco.  Drug History:  reports no history of drug use.    Review of patient's allergies indicates:   Allergen Reactions    Tetracyclines Swelling       Current Outpatient Medications   Medication Sig Dispense Refill    albuterol (PROVENTIL/VENTOLIN HFA) 90 mcg/actuation inhaler Inhale 2 puffs into the lungs every 6 (six) hours as needed.      alcohol swabs PadM USE THREE TIMES DAILY  TO  CHECK BLOOD SUGAR      amLODIPine (NORVASC) 5 MG tablet       aspirin (ECOTRIN) 81 MG EC tablet Take 81 mg by mouth once daily.       BD ALCOHOL SWABS PadM       ciprofloxacin HCl (CIPRO) 500 MG tablet Take 500 mg by mouth 2 (two) times daily.      clopidogreL (PLAVIX) 75 mg tablet 75 mg once daily.      collagenase (SANTYL) ointment Apply topically once daily. 30 g 1    DROPLET INSULIN SYRINGE 1 mL 31 gauge x 15/64" Syrg       gabapentin (NEURONTIN) 300 MG capsule Take " "300 mg by mouth 2 (two) times daily.       HYDROcodone-acetaminophen (NORCO)  mg per tablet Take 1 tablet by mouth every 4 (four) hours as needed. (Patient not taking: Reported on 1/11/2023) 40 tablet 0    HYDROcodone-acetaminophen (NORCO)  mg per tablet Take 1 tablet by mouth every 4 (four) hours as needed (pain). (Patient not taking: Reported on 1/11/2023) 25 tablet 0    HYDROcodone-acetaminophen (NORCO)  mg per tablet Take 1 tablet by mouth every 4 (four) hours as needed for Pain. 25 tablet 0    insulin glargine (LANTUS) 100 unit/mL injection Inject 35 Units into the skin once daily. And takes 20 units q pm      insulin syringe-needle U-100 0.3 mL 30 gauge x 5/16" Syrg by Other route.      levothyroxine (SYNTHROID) 25 MCG tablet Take 25 mcg by mouth once daily.      lisinopriL (PRINIVIL,ZESTRIL) 20 MG tablet Take 1 tablet (20 mg total) by mouth once daily. (Patient taking differently: Take 10 mg by mouth once daily.)      lisinopriL 10 MG tablet       metformin (GLUCOPHAGE) 500 MG tablet Take 500 mg by mouth 2 (two) times daily with meals.      metoprolol succinate (TOPROL-XL) 50 MG 24 hr tablet Take 50 mg by mouth 2 (two) times daily. 25 mg + 50 mg = 75 mg BID      NOVOLIN R REGULAR U-100 INSULN 100 unit/mL injection Inject 6 Units into the skin 2 (two) times daily before meals.      rosuvastatin (CRESTOR) 40 MG Tab Take 40 mg by mouth once daily.      triamterene-hydrochlorothiazide 37.5-25 mg (MAXZIDE-25) 37.5-25 mg per tablet Take 1 tablet by mouth once daily.       TRUE METRIX GLUCOSE TEST STRIP Strp       TRUEPLUS LANCETS 30 gauge Misc        No current facility-administered medications for this visit.       Review of Systems   Musculoskeletal:  Positive for gait problem.   Allergic/Immunologic: Positive for immunocompromised state.   Neurological:  Positive for numbness.       Objective:        Physical Exam:   Foot Exam  Physical Exam  Ortho/SPM Exam     Physical examination: General: " Pt. is well-developed, well-nourished, appears stated age, in no acute distress, alert and oriented x 3.     Vascular: Dorsalis pedis pulses are diminished bilaterally and posterior tibial pulses are 1/4 Bilaterally. Toes are cool to touch.     Neurologic: [place] 5.07 monofilament is decreased bilateral feet. Sharp/dull sensation absent Bilateral feet.     Vibratory sensation absent bilateral     Hammertoes 2,3 bilaterally.  Plantarflexed first metatarsals, bilaterally      Musculoskeletal: adequate joint range of motion without pain, limitation, nor crepitation Bilateral feet and ankle joints. Muscle strength is 5/5 in all groups bilaterally.     Lymphatics: no lymphangitic streaking bilaterally.       Dry scale with superficial flakes over an erythematous base bilateral feet in moccasin distribution without ulceration, drainage, pus, tracking, fluctuance, malodor, or cardinal signs infection.    Toenails 1st, 2nd, 3rd, 4th, 5th  bilateral are hypertrophic, dystrophic, discolored tanish brown with tan, gray crumbly subungual debris.  Tender to distal nail plate pressure, without periungual skin abnormality of each.      Imaging:            Assessment:       1. Type II diabetes mellitus with neurological manifestations    2. PVD (peripheral vascular disease) with claudication    3. Hammertoe, bilateral    4. Bilateral lower extremity edema    5. OG (onychogryphosis)    6. Difficulty in walking, not elsewhere classified    7. PVD (peripheral vascular disease)    8. Difficulty walking    9. Tinea pedis of both feet    10. Onychogryphosis    11. Comprehensive diabetic foot examination, type 2 DM, encounter for    12. Encounter for diabetic foot exam      Plan:   Type II diabetes mellitus with neurological manifestations  -     Routine Foot Care  -      DIABETES FOOT EXAM    PVD (peripheral vascular disease) with claudication  -     Routine Foot Care    Hammertoe, bilateral  -     Routine Foot  Care    Bilateral lower extremity edema  -     Routine Foot Care    OG (onychogryphosis)  -     Routine Foot Care    Difficulty in walking, not elsewhere classified  -     Routine Foot Care    PVD (peripheral vascular disease)  -     Routine Foot Care    Difficulty walking  -     Routine Foot Care    Tinea pedis of both feet  -     Routine Foot Care    Onychogryphosis  -     Routine Foot Care    Comprehensive diabetic foot examination, type 2 DM, encounter for  -     Routine Foot Care  -      DIABETES FOOT EXAM    Encounter for diabetic foot exam  -     Routine Foot Care  -      DIABETES FOOT EXAM      No follow-ups on file.    Routine Foot Care    Date/Time: 1/18/2023 10:30 AM  Performed by: Louise Damon DPM  Authorized by: Louise Damon DPM     Consent Done?:  Yes (Written)  Hyperkeratotic Skin Lesions?: No      Nail Care Type:  Debride  Location(s): All  (Left 1st Toe, Left 3rd Toe, Left 2nd Toe, Left 4th Toe, Left 5th Toe, Right 1st Toe, Right 2nd Toe, Right 3rd Toe, Right 4th Toe and Right 5th Toe)  Patient tolerance:  Patient tolerated the procedure well with no immediate complications          Counseling:     I provided patient education verbally regarding:   Patient diagnosis, treatment options, as well as alternatives, risks, and benefits.     This note was created using Dragon voice recognition software that occasionally misinterpreted phrases or words.           Patient should call the office immediately if any signs of infection, such as fever, chills, sweats, increased redness or pain.     Patient was instructed to call the clinic or go to the emergency department if their symptoms do not improve, worsens, or if new symptoms develop.  Patient was advised that if any increased swelling, pain, or numbness arise to go immediately to the ED. Patient knows to call any time if an emergency arises. Shared decision making occurred and patient verbalized understanding in agreement with this plan.          Counseling/Education:  I provided patient education verbally regarding:   The aspects of diabetes and how it pertains to the feet. I explained the importance of proper diabetic foot care and how it is essential for the health of their feet.     I discussed the importance of knowing their Hemoglobin A1c and that the level needs to be as close to 6 as possible. I discussed the increase complications of high blood sugar including stroke, blindness, heart attack, kidney failure and loss of limb secondary to neuropathy and PVD.      With neuropathy, beware of any breaks in the skin or redness. These areas are not recognized early due to the numbness.     I discussed Diabetes, lower back issues, metabolic disorders, systemic causes, chemotherapy, vitamin deficiency, heavy metal exposure, as some of the causes. I also explained that as much as 40% of the time we can not find a cause. I discussed different treatments available to control the symptoms but which may not cure the problem.         Counseled patient on the aspects of diabetes and how it pertains to the feet.  I explained the importance of proper diabetic foot care and how it is essential for the health of their feet.        Shoe inspection. Patient instructed on proper foot hygeine. We discussed wearing proper shoe gear, daily foot inspections, never walking without protective shoe gear, never putting sharp instruments to feet, routine podiatric nail visits every 2-3 months.       Additional patient instructions:      - Check feet daily for wounds and areas of irritation.     - Keep regularly scheduled appointments with primary care, ophthalmology, and podiatry.     - Maintain blood glucose control via taking medications as prescribed, diabetic diet, and exercise.     - Apply moisturizing cream to feet and ankles daily but not between toes.      - Keep feet clean and dry, especially between toes.     - Elevate feet as much as possible throughout the day.     -  Wear supportive/accommodative shoes at all times when ambulating.     - Supplement with alpha lipoic acid (600 mg once daily by mouth) and vitamin B complex (as directed per package insert) to reduce and repair nerve damage.     - Wear over-the-counter compression socks at all times when ambulating.  Do not wear them while resting for prolonged periods of time such as when sleeping.      - Notify clinic immediately of any new or worsening conditions.              I counseled the patient on their conditions, their implications and medical management.     >50% of this > 60 minute visit was spent face to face educating/counseling the patient     I spent a total of 60 minutes on the day of the visit.This includes face to face time and non-face to face time preparing to see the patient (eg, review of tests), obtaining and/or reviewing separately obtained history, documenting clinical information in the electronic or other health record, independently interpreting results and communicating results to the patient/family/caregiver, or care coordinator.

## 2023-03-03 DIAGNOSIS — R41.3 MEMORY CHANGE: Primary | ICD-10-CM

## 2023-03-17 ENCOUNTER — DOCUMENT SCAN (OUTPATIENT)
Dept: HOME HEALTH SERVICES | Facility: HOSPITAL | Age: 83
End: 2023-03-17
Payer: MEDICARE

## 2023-03-23 ENCOUNTER — HOSPITAL ENCOUNTER (OUTPATIENT)
Dept: RADIOLOGY | Facility: HOSPITAL | Age: 83
Discharge: HOME | End: 2023-03-23
Attending: NURSE PRACTITIONER
Payer: MEDICARE

## 2023-03-23 DIAGNOSIS — R41.3 MEMORY CHANGE: ICD-10-CM

## 2023-03-23 PROCEDURE — 70551 MRI BRAIN STEM W/O DYE: CPT | Mod: TC,PO

## 2023-03-27 ENCOUNTER — LAB VISIT (OUTPATIENT)
Dept: LAB | Facility: HOSPITAL | Age: 83
End: 2023-03-27
Attending: NURSE PRACTITIONER
Payer: MEDICARE

## 2023-03-27 DIAGNOSIS — R41.3 MEMORY LOSS: Primary | ICD-10-CM

## 2023-03-27 LAB
AMMONIA PLAS-SCNC: 20 UMOL/L (ref 10–50)
FOLATE SERPL-MCNC: 9.2 NG/ML (ref 4–24)
TSH SERPL DL<=0.005 MIU/L-ACNC: 1.49 UIU/ML (ref 0.34–5.6)
VIT B12 SERPL-MCNC: 159 PG/ML (ref 210–950)

## 2023-03-27 PROCEDURE — 84443 ASSAY THYROID STIM HORMONE: CPT | Performed by: NURSE PRACTITIONER

## 2023-03-27 PROCEDURE — 84425 ASSAY OF VITAMIN B-1: CPT | Performed by: NURSE PRACTITIONER

## 2023-03-27 PROCEDURE — 82746 ASSAY OF FOLIC ACID SERUM: CPT | Performed by: NURSE PRACTITIONER

## 2023-03-27 PROCEDURE — 82140 ASSAY OF AMMONIA: CPT | Performed by: NURSE PRACTITIONER

## 2023-03-27 PROCEDURE — 86592 SYPHILIS TEST NON-TREP QUAL: CPT | Performed by: NURSE PRACTITIONER

## 2023-03-27 PROCEDURE — 82607 VITAMIN B-12: CPT | Performed by: NURSE PRACTITIONER

## 2023-03-28 LAB — RPR SER QL: NORMAL

## 2023-03-31 LAB — VIT B1 BLD-SCNC: 97.7 NMOL/L (ref 66.5–200)

## 2023-04-04 DIAGNOSIS — I63.89 CEREBROVASCULAR ACCIDENT (CVA) DUE TO OTHER MECHANISM: Primary | ICD-10-CM

## 2023-05-01 DIAGNOSIS — Z95.1 S/P CABG (CORONARY ARTERY BYPASS GRAFT): ICD-10-CM

## 2023-05-01 DIAGNOSIS — E78.5 HYPERLIPIDEMIA ASSOCIATED WITH TYPE 2 DIABETES MELLITUS: ICD-10-CM

## 2023-05-01 DIAGNOSIS — I25.2 OLD MI (MYOCARDIAL INFARCTION): Primary | ICD-10-CM

## 2023-05-01 DIAGNOSIS — E11.69 HYPERLIPIDEMIA ASSOCIATED WITH TYPE 2 DIABETES MELLITUS: ICD-10-CM

## 2023-05-02 DIAGNOSIS — I63.9 CVA (CEREBRAL VASCULAR ACCIDENT): ICD-10-CM

## 2023-05-02 DIAGNOSIS — Z72.0 CURRENT OCCASIONAL SMOKER: ICD-10-CM

## 2023-05-02 DIAGNOSIS — E66.01 OBESITY, CLASS III, BMI 40-49.9 (MORBID OBESITY): ICD-10-CM

## 2023-05-02 DIAGNOSIS — I10 HYPERTENSION: ICD-10-CM

## 2023-05-02 DIAGNOSIS — E78.5 HYPERLIPIDEMIA ASSOCIATED WITH TYPE 2 DIABETES MELLITUS: ICD-10-CM

## 2023-05-02 DIAGNOSIS — Z95.1 S/P CABG (CORONARY ARTERY BYPASS GRAFT): ICD-10-CM

## 2023-05-02 DIAGNOSIS — E11.69 HYPERLIPIDEMIA ASSOCIATED WITH TYPE 2 DIABETES MELLITUS: ICD-10-CM

## 2023-05-02 DIAGNOSIS — I25.2 OLD MYOCARDIAL INFARCTION: Primary | ICD-10-CM

## 2023-05-02 DIAGNOSIS — I73.9 PVD (PERIPHERAL VASCULAR DISEASE) WITH CLAUDICATION: ICD-10-CM

## 2023-05-09 ENCOUNTER — HOSPITAL ENCOUNTER (OUTPATIENT)
Dept: CARDIOLOGY | Facility: HOSPITAL | Age: 83
Discharge: HOME | End: 2023-05-09
Attending: NURSE PRACTITIONER
Payer: MEDICARE

## 2023-05-09 ENCOUNTER — HOSPITAL ENCOUNTER (OUTPATIENT)
Dept: RADIOLOGY | Facility: HOSPITAL | Age: 83
Discharge: HOME | End: 2023-05-09
Attending: NURSE PRACTITIONER
Payer: MEDICARE

## 2023-05-09 VITALS — WEIGHT: 270 LBS | BODY MASS INDEX: 40.92 KG/M2 | HEIGHT: 68 IN

## 2023-05-09 DIAGNOSIS — I63.89 CEREBROVASCULAR ACCIDENT (CVA) DUE TO OTHER MECHANISM: ICD-10-CM

## 2023-05-09 PROCEDURE — 70544 MR ANGIOGRAPHY HEAD W/O DYE: CPT | Mod: TC

## 2023-05-09 PROCEDURE — 93306 TTE W/DOPPLER COMPLETE: CPT

## 2023-05-09 PROCEDURE — 93880 EXTRACRANIAL BILAT STUDY: CPT | Mod: TC

## 2023-05-09 PROCEDURE — 93306 TTE W/DOPPLER COMPLETE: CPT | Mod: 26,,, | Performed by: SPECIALIST

## 2023-05-09 PROCEDURE — 93306 ECHO (CUPID ONLY): ICD-10-PCS | Mod: 26,,, | Performed by: SPECIALIST

## 2023-05-10 DIAGNOSIS — I10 HTN (HYPERTENSION): ICD-10-CM

## 2023-05-10 DIAGNOSIS — R06.02 SHORTNESS OF BREATH: ICD-10-CM

## 2023-05-10 DIAGNOSIS — E66.01 OBESITY, CLASS III, BMI 40-49.9 (MORBID OBESITY): ICD-10-CM

## 2023-05-10 DIAGNOSIS — R60.9 EDEMA: Primary | ICD-10-CM

## 2023-05-10 DIAGNOSIS — I25.2 OLD MI (MYOCARDIAL INFARCTION): ICD-10-CM

## 2023-05-10 DIAGNOSIS — E11.9 DIABETES: ICD-10-CM

## 2023-05-17 LAB
AORTIC ROOT ANNULUS: 3.9 CM
AORTIC VALVE CUSP SEPERATION: 2 CM
AV INDEX (PROSTH): 0.67
AV MEAN GRADIENT: 5 MMHG
AV PEAK GRADIENT: 9 MMHG
AV VALVE AREA: 2.09 CM2
AV VELOCITY RATIO: 0.62
BSA FOR ECHO PROCEDURE: 2.42 M2
CV ECHO LV RWT: 0.35 CM
DOP CALC AO PEAK VEL: 1.49 M/S
DOP CALC AO VTI: 31.2 CM
DOP CALC LVOT AREA: 3.1 CM2
DOP CALC LVOT DIAMETER: 2 CM
DOP CALC LVOT PEAK VEL: 0.92 M/S
DOP CALC LVOT STROKE VOLUME: 65.31 CM3
DOP CALCLVOT PEAK VEL VTI: 20.8 CM
E WAVE DECELERATION TIME: 257 MSEC
E/A RATIO: 0.76
E/E' RATIO: 13.08 M/S
ECHO LV POSTERIOR WALL: 0.84 CM (ref 0.6–1.1)
EJECTION FRACTION: 75 %
FRACTIONAL SHORTENING: 43 % (ref 28–44)
INTERVENTRICULAR SEPTUM: 0.97 CM (ref 0.6–1.1)
IVRT: 151 MSEC
LEFT INTERNAL DIMENSION IN SYSTOLE: 2.72 CM (ref 2.1–4)
LEFT VENTRICLE DIASTOLIC VOLUME INDEX: 46.55 ML/M2
LEFT VENTRICLE DIASTOLIC VOLUME: 108 ML
LEFT VENTRICLE MASS INDEX: 64 G/M2
LEFT VENTRICLE SYSTOLIC VOLUME INDEX: 11.9 ML/M2
LEFT VENTRICLE SYSTOLIC VOLUME: 27.5 ML
LEFT VENTRICULAR INTERNAL DIMENSION IN DIASTOLE: 4.81 CM (ref 3.5–6)
LEFT VENTRICULAR MASS: 149.39 G
LV LATERAL E/E' RATIO: 12.14 M/S
LV SEPTAL E/E' RATIO: 14.17 M/S
LVOT MG: 2 MMHG
LVOT MV: 0.63 CM/S
MV PEAK A VEL: 1.12 M/S
MV PEAK E VEL: 0.85 M/S
MV STENOSIS PRESSURE HALF TIME: 65 MS
MV VALVE AREA P 1/2 METHOD: 3.38 CM2
PISA TR MAX VEL: 1.96 M/S
RA PRESSURE: 3 MMHG
RIGHT VENTRICULAR END-DIASTOLIC DIMENSION: 2.41 CM
TDI LATERAL: 0.07 M/S
TDI SEPTAL: 0.06 M/S
TDI: 0.07 M/S
TR MAX PG: 15 MMHG
TV REST PULMONARY ARTERY PRESSURE: 18 MMHG

## 2023-06-05 ENCOUNTER — TELEPHONE (OUTPATIENT)
Dept: CARDIOLOGY | Facility: HOSPITAL | Age: 83
End: 2023-06-05

## 2023-06-13 ENCOUNTER — TELEPHONE (OUTPATIENT)
Dept: CARDIOLOGY | Facility: HOSPITAL | Age: 83
End: 2023-06-13

## 2023-06-13 DIAGNOSIS — R60.9 EDEMA: Primary | ICD-10-CM

## 2023-06-13 DIAGNOSIS — I10 ESSENTIAL HYPERTENSION, MALIGNANT: ICD-10-CM

## 2023-06-13 DIAGNOSIS — E66.01 MORBID OBESITY: ICD-10-CM

## 2023-06-13 DIAGNOSIS — R06.00 DYSPNEA: ICD-10-CM

## 2023-06-13 DIAGNOSIS — E11.9 DIABETES MELLITUS WITHOUT COMPLICATION: ICD-10-CM

## 2023-06-13 DIAGNOSIS — I25.2 OLD MYOCARDIAL INFARCTION: ICD-10-CM

## 2023-06-13 DIAGNOSIS — R06.02 SHORTNESS OF BREATH: ICD-10-CM

## 2023-06-14 ENCOUNTER — HOSPITAL ENCOUNTER (OUTPATIENT)
Dept: RADIOLOGY | Facility: HOSPITAL | Age: 83
Discharge: HOME | End: 2023-06-14
Attending: SPECIALIST
Payer: MEDICARE

## 2023-06-14 ENCOUNTER — CLINICAL SUPPORT (OUTPATIENT)
Dept: CARDIOLOGY | Facility: HOSPITAL | Age: 83
End: 2023-06-14
Attending: SPECIALIST
Payer: MEDICARE

## 2023-06-14 DIAGNOSIS — E11.9 DIABETES MELLITUS WITHOUT COMPLICATION: ICD-10-CM

## 2023-06-14 DIAGNOSIS — R06.00 DYSPNEA: ICD-10-CM

## 2023-06-14 DIAGNOSIS — I10 ESSENTIAL HYPERTENSION, MALIGNANT: ICD-10-CM

## 2023-06-14 DIAGNOSIS — E66.01 MORBID OBESITY: ICD-10-CM

## 2023-06-14 DIAGNOSIS — R06.02 SHORTNESS OF BREATH: ICD-10-CM

## 2023-06-14 DIAGNOSIS — N32.81 DETRUSOR INSTABILITY OF BLADDER: Primary | ICD-10-CM

## 2023-06-14 DIAGNOSIS — N39.3 FEMALE STRESS INCONTINENCE: ICD-10-CM

## 2023-06-14 DIAGNOSIS — I25.2 OLD MYOCARDIAL INFARCTION: ICD-10-CM

## 2023-06-14 LAB
CV PHARM DOSE: 0.4 MG
CV STRESS BASE HR: 73 BPM
DIASTOLIC BLOOD PRESSURE: 61 MMHG
OHS CV CPX 1 MINUTE RECOVERY HEART RATE: 80 BPM
OHS CV CPX 85 PERCENT MAX PREDICTED HEART RATE MALE: 115
OHS CV CPX MAX PREDICTED HEART RATE: 136
OHS CV CPX PATIENT IS FEMALE: 1
OHS CV CPX PATIENT IS MALE: 0
OHS CV CPX PEAK DIASTOLIC BLOOD PRESSURE: 55 MMHG
OHS CV CPX PEAK HEAR RATE: 86 BPM
OHS CV CPX PEAK RATE PRESSURE PRODUCT: 9976
OHS CV CPX PEAK SYSTOLIC BLOOD PRESSURE: 116 MMHG
OHS CV CPX PERCENT MAX PREDICTED HEART RATE ACHIEVED: 63
OHS CV CPX RATE PRESSURE PRODUCT PRESENTING: 8176
SYSTOLIC BLOOD PRESSURE: 112 MMHG

## 2023-06-14 PROCEDURE — 63600175 PHARM REV CODE 636 W HCPCS: Performed by: SPECIALIST

## 2023-06-14 PROCEDURE — 93016 NUCLEAR STRESS TEST (CUPID ONLY): ICD-10-PCS | Mod: ,,, | Performed by: GENERAL PRACTICE

## 2023-06-14 PROCEDURE — 93017 CV STRESS TEST TRACING ONLY: CPT

## 2023-06-14 PROCEDURE — 93016 CV STRESS TEST SUPVJ ONLY: CPT | Mod: ,,, | Performed by: GENERAL PRACTICE

## 2023-06-14 PROCEDURE — 93018 CV STRESS TEST I&R ONLY: CPT | Mod: ,,, | Performed by: GENERAL PRACTICE

## 2023-06-14 PROCEDURE — 78452 HT MUSCLE IMAGE SPECT MULT: CPT | Mod: TC

## 2023-06-14 PROCEDURE — 93018 NUCLEAR STRESS TEST (CUPID ONLY): ICD-10-PCS | Mod: ,,, | Performed by: GENERAL PRACTICE

## 2023-06-14 PROCEDURE — A9502 TC99M TETROFOSMIN: HCPCS

## 2023-06-14 RX ORDER — REGADENOSON 0.08 MG/ML
0.4 INJECTION, SOLUTION INTRAVENOUS
Status: COMPLETED | OUTPATIENT
Start: 2023-06-14 | End: 2023-06-14

## 2023-06-14 RX ADMIN — REGADENOSON 0.4 MG: 0.08 INJECTION, SOLUTION INTRAVENOUS at 11:06

## 2023-06-15 ENCOUNTER — HOSPITAL ENCOUNTER (OUTPATIENT)
Dept: RADIOLOGY | Facility: HOSPITAL | Age: 83
Discharge: HOME | End: 2023-06-15
Attending: SPECIALIST
Payer: MEDICARE

## 2023-06-24 ENCOUNTER — HOSPITAL ENCOUNTER (OUTPATIENT)
Facility: HOSPITAL | Age: 83
Discharge: HOME | End: 2023-06-26
Attending: EMERGENCY MEDICINE | Admitting: FAMILY MEDICINE
Payer: MEDICARE

## 2023-06-24 DIAGNOSIS — R07.9 CHEST PAIN: Primary | ICD-10-CM

## 2023-06-24 PROBLEM — N18.30 ACUTE RENAL FAILURE SUPERIMPOSED ON STAGE 3 CHRONIC KIDNEY DISEASE: Chronic | Status: ACTIVE | Noted: 2023-06-24

## 2023-06-24 PROBLEM — E11.69 DIABETES MELLITUS TYPE 2 IN OBESE: Chronic | Status: ACTIVE | Noted: 2023-06-24

## 2023-06-24 PROBLEM — E66.01 MORBID OBESITY: Chronic | Status: ACTIVE | Noted: 2017-08-21

## 2023-06-24 PROBLEM — N17.9 ACUTE RENAL FAILURE SUPERIMPOSED ON STAGE 3 CHRONIC KIDNEY DISEASE: Chronic | Status: ACTIVE | Noted: 2023-06-24

## 2023-06-24 PROBLEM — E66.9 DIABETES MELLITUS TYPE 2 IN OBESE: Chronic | Status: ACTIVE | Noted: 2023-06-24

## 2023-06-24 PROBLEM — E03.9 HYPOTHYROIDISM: Chronic | Status: ACTIVE | Noted: 2017-08-21

## 2023-06-24 PROBLEM — J44.9 COPD (CHRONIC OBSTRUCTIVE PULMONARY DISEASE): Chronic | Status: ACTIVE | Noted: 2023-06-24

## 2023-06-24 PROBLEM — I10 HYPERTENSION: Chronic | Status: ACTIVE | Noted: 2022-07-21

## 2023-06-24 LAB
ALBUMIN SERPL BCP-MCNC: 3.4 G/DL (ref 3.5–5.2)
ALP SERPL-CCNC: 61 U/L (ref 55–135)
ALT SERPL W/O P-5'-P-CCNC: 16 U/L (ref 10–44)
ANION GAP SERPL CALC-SCNC: 8 MMOL/L (ref 8–16)
AST SERPL-CCNC: 17 U/L (ref 10–40)
BASOPHILS # BLD AUTO: 0.03 K/UL (ref 0–0.2)
BASOPHILS NFR BLD: 0.4 % (ref 0–1.9)
BILIRUB SERPL-MCNC: 0.7 MG/DL (ref 0.1–1)
BNP SERPL-MCNC: 64 PG/ML (ref 0–99)
BNP SERPL-MCNC: 64 PG/ML (ref 0–99)
BUN SERPL-MCNC: 19 MG/DL (ref 8–23)
CALCIUM SERPL-MCNC: 9.2 MG/DL (ref 8.7–10.5)
CHLORIDE SERPL-SCNC: 105 MMOL/L (ref 95–110)
CO2 SERPL-SCNC: 27 MMOL/L (ref 23–29)
CREAT SERPL-MCNC: 2.4 MG/DL (ref 0.5–1.4)
DIFFERENTIAL METHOD: ABNORMAL
EOSINOPHIL # BLD AUTO: 0.1 K/UL (ref 0–0.5)
EOSINOPHIL NFR BLD: 1.7 % (ref 0–8)
ERYTHROCYTE [DISTWIDTH] IN BLOOD BY AUTOMATED COUNT: 12.1 % (ref 11.5–14.5)
EST. GFR  (NO RACE VARIABLE): 19.9 ML/MIN/1.73 M^2
GLUCOSE SERPL-MCNC: 140 MG/DL (ref 70–110)
GLUCOSE SERPL-MCNC: 189 MG/DL (ref 70–110)
HCT VFR BLD AUTO: 39.4 % (ref 37–48.5)
HGB BLD-MCNC: 13 G/DL (ref 12–16)
IMM GRANULOCYTES # BLD AUTO: 0.02 K/UL (ref 0–0.04)
IMM GRANULOCYTES NFR BLD AUTO: 0.3 % (ref 0–0.5)
INR PPP: 1 (ref 0.8–1.2)
LYMPHOCYTES # BLD AUTO: 1.4 K/UL (ref 1–4.8)
LYMPHOCYTES NFR BLD: 18.6 % (ref 18–48)
MAGNESIUM SERPL-MCNC: 2.1 MG/DL (ref 1.6–2.6)
MCH RBC QN AUTO: 33.3 PG (ref 27–31)
MCHC RBC AUTO-ENTMCNC: 33 G/DL (ref 32–36)
MCV RBC AUTO: 101 FL (ref 82–98)
MONOCYTES # BLD AUTO: 0.6 K/UL (ref 0.3–1)
MONOCYTES NFR BLD: 7.8 % (ref 4–15)
NEUTROPHILS # BLD AUTO: 5.5 K/UL (ref 1.8–7.7)
NEUTROPHILS NFR BLD: 71.2 % (ref 38–73)
NRBC BLD-RTO: 0 /100 WBC
PLATELET # BLD AUTO: 232 K/UL (ref 150–450)
PMV BLD AUTO: 9.4 FL (ref 9.2–12.9)
POTASSIUM SERPL-SCNC: 3.6 MMOL/L (ref 3.5–5.1)
PROT SERPL-MCNC: 6.7 G/DL (ref 6–8.4)
PROTHROMBIN TIME: 10.9 SEC (ref 9–12.5)
RBC # BLD AUTO: 3.9 M/UL (ref 4–5.4)
SODIUM SERPL-SCNC: 140 MMOL/L (ref 136–145)
TROPONIN I SERPL HS-MCNC: 6.5 PG/ML (ref 0–14.9)
TROPONIN I SERPL HS-MCNC: 7.2 PG/ML (ref 0–14.9)
WBC # BLD AUTO: 7.69 K/UL (ref 3.9–12.7)

## 2023-06-24 PROCEDURE — 83735 ASSAY OF MAGNESIUM: CPT | Performed by: EMERGENCY MEDICINE

## 2023-06-24 PROCEDURE — 93010 EKG 12-LEAD: ICD-10-PCS | Mod: ,,, | Performed by: SPECIALIST

## 2023-06-24 PROCEDURE — G0378 HOSPITAL OBSERVATION PER HR: HCPCS

## 2023-06-24 PROCEDURE — 25000003 PHARM REV CODE 250: Performed by: FAMILY MEDICINE

## 2023-06-24 PROCEDURE — 85610 PROTHROMBIN TIME: CPT | Performed by: EMERGENCY MEDICINE

## 2023-06-24 PROCEDURE — 84484 ASSAY OF TROPONIN QUANT: CPT | Mod: 91 | Performed by: EMERGENCY MEDICINE

## 2023-06-24 PROCEDURE — 93005 ELECTROCARDIOGRAM TRACING: CPT | Performed by: SPECIALIST

## 2023-06-24 PROCEDURE — 83880 ASSAY OF NATRIURETIC PEPTIDE: CPT | Performed by: EMERGENCY MEDICINE

## 2023-06-24 PROCEDURE — 96372 THER/PROPH/DIAG INJ SC/IM: CPT | Performed by: NURSE PRACTITIONER

## 2023-06-24 PROCEDURE — 85025 COMPLETE CBC W/AUTO DIFF WBC: CPT | Performed by: EMERGENCY MEDICINE

## 2023-06-24 PROCEDURE — 25000003 PHARM REV CODE 250: Performed by: NURSE PRACTITIONER

## 2023-06-24 PROCEDURE — 99285 EMERGENCY DEPT VISIT HI MDM: CPT | Mod: 25

## 2023-06-24 PROCEDURE — 82962 GLUCOSE BLOOD TEST: CPT

## 2023-06-24 PROCEDURE — 93010 ELECTROCARDIOGRAM REPORT: CPT | Mod: ,,, | Performed by: SPECIALIST

## 2023-06-24 PROCEDURE — 80053 COMPREHEN METABOLIC PANEL: CPT | Performed by: EMERGENCY MEDICINE

## 2023-06-24 RX ORDER — NITROGLYCERIN 0.3 MG/1
0.3 TABLET SUBLINGUAL EVERY 5 MIN PRN
Status: DISCONTINUED | OUTPATIENT
Start: 2023-06-24 | End: 2023-06-26 | Stop reason: HOSPADM

## 2023-06-24 RX ORDER — SOLIFENACIN SUCCINATE 10 MG/1
10 TABLET, FILM COATED ORAL DAILY
COMMUNITY
Start: 2023-06-13

## 2023-06-24 RX ORDER — ONDANSETRON 2 MG/ML
4 INJECTION INTRAMUSCULAR; INTRAVENOUS EVERY 8 HOURS PRN
Status: DISCONTINUED | OUTPATIENT
Start: 2023-06-24 | End: 2023-06-26 | Stop reason: HOSPADM

## 2023-06-24 RX ORDER — TALC
6 POWDER (GRAM) TOPICAL NIGHTLY PRN
Status: DISCONTINUED | OUTPATIENT
Start: 2023-06-24 | End: 2023-06-26 | Stop reason: HOSPADM

## 2023-06-24 RX ORDER — ACETAMINOPHEN 325 MG/1
650 TABLET ORAL EVERY 8 HOURS PRN
Status: DISCONTINUED | OUTPATIENT
Start: 2023-06-24 | End: 2023-06-26 | Stop reason: HOSPADM

## 2023-06-24 RX ORDER — AMLODIPINE BESYLATE 5 MG/1
5 TABLET ORAL DAILY
Status: DISCONTINUED | OUTPATIENT
Start: 2023-06-25 | End: 2023-06-26 | Stop reason: HOSPADM

## 2023-06-24 RX ORDER — LANOLIN ALCOHOL/MO/W.PET/CERES
800 CREAM (GRAM) TOPICAL
Status: DISCONTINUED | OUTPATIENT
Start: 2023-06-24 | End: 2023-06-26 | Stop reason: HOSPADM

## 2023-06-24 RX ORDER — ALBUTEROL SULFATE 0.83 MG/ML
2.5 SOLUTION RESPIRATORY (INHALATION) EVERY 6 HOURS PRN
Status: DISCONTINUED | OUTPATIENT
Start: 2023-06-24 | End: 2023-06-26 | Stop reason: HOSPADM

## 2023-06-24 RX ORDER — IBUPROFEN 200 MG
24 TABLET ORAL
Status: DISCONTINUED | OUTPATIENT
Start: 2023-06-24 | End: 2023-06-26 | Stop reason: HOSPADM

## 2023-06-24 RX ORDER — IBUPROFEN 200 MG
1 TABLET ORAL DAILY
Status: DISCONTINUED | OUTPATIENT
Start: 2023-06-25 | End: 2023-06-26 | Stop reason: HOSPADM

## 2023-06-24 RX ORDER — TRAMADOL HYDROCHLORIDE 50 MG/1
50 TABLET ORAL EVERY 8 HOURS PRN
COMMUNITY
Start: 2023-05-16

## 2023-06-24 RX ORDER — SODIUM CHLORIDE 0.9 % (FLUSH) 0.9 %
10 SYRINGE (ML) INJECTION
Status: DISCONTINUED | OUTPATIENT
Start: 2023-06-24 | End: 2023-06-26 | Stop reason: HOSPADM

## 2023-06-24 RX ORDER — LEVOTHYROXINE SODIUM 25 UG/1
25 TABLET ORAL
Status: DISCONTINUED | OUTPATIENT
Start: 2023-06-25 | End: 2023-06-26 | Stop reason: HOSPADM

## 2023-06-24 RX ORDER — GABAPENTIN 300 MG/1
300 CAPSULE ORAL 2 TIMES DAILY
Status: DISCONTINUED | OUTPATIENT
Start: 2023-06-24 | End: 2023-06-26 | Stop reason: HOSPADM

## 2023-06-24 RX ORDER — CILOSTAZOL 100 MG/1
100 TABLET ORAL 2 TIMES DAILY
COMMUNITY

## 2023-06-24 RX ORDER — ATORVASTATIN CALCIUM 40 MG/1
80 TABLET, FILM COATED ORAL DAILY
Status: DISCONTINUED | OUTPATIENT
Start: 2023-06-25 | End: 2023-06-26 | Stop reason: HOSPADM

## 2023-06-24 RX ORDER — SODIUM,POTASSIUM PHOSPHATES 280-250MG
2 POWDER IN PACKET (EA) ORAL
Status: DISCONTINUED | OUTPATIENT
Start: 2023-06-24 | End: 2023-06-26 | Stop reason: HOSPADM

## 2023-06-24 RX ORDER — PANTOPRAZOLE SODIUM 40 MG/1
40 TABLET, DELAYED RELEASE ORAL DAILY
COMMUNITY
Start: 2023-06-06

## 2023-06-24 RX ORDER — TRIAMTERENE/HYDROCHLOROTHIAZID 37.5-25 MG
1 TABLET ORAL DAILY
Status: DISCONTINUED | OUTPATIENT
Start: 2023-06-25 | End: 2023-06-24

## 2023-06-24 RX ORDER — CLOPIDOGREL BISULFATE 75 MG/1
75 TABLET ORAL DAILY
Status: DISCONTINUED | OUTPATIENT
Start: 2023-06-25 | End: 2023-06-26 | Stop reason: HOSPADM

## 2023-06-24 RX ORDER — OXYBUTYNIN CHLORIDE 5 MG/1
10 TABLET, EXTENDED RELEASE ORAL DAILY
Status: DISCONTINUED | OUTPATIENT
Start: 2023-06-25 | End: 2023-06-26 | Stop reason: HOSPADM

## 2023-06-24 RX ORDER — LISINOPRIL 10 MG/1
10 TABLET ORAL DAILY
Status: DISCONTINUED | OUTPATIENT
Start: 2023-06-25 | End: 2023-06-26 | Stop reason: HOSPADM

## 2023-06-24 RX ORDER — ASPIRIN 81 MG/1
81 TABLET ORAL DAILY
Status: DISCONTINUED | OUTPATIENT
Start: 2023-06-25 | End: 2023-06-26 | Stop reason: HOSPADM

## 2023-06-24 RX ORDER — SODIUM CHLORIDE 9 MG/ML
INJECTION, SOLUTION INTRAVENOUS CONTINUOUS
Status: ACTIVE | OUTPATIENT
Start: 2023-06-24 | End: 2023-06-25

## 2023-06-24 RX ORDER — GLUCAGON 1 MG
1 KIT INJECTION
Status: DISCONTINUED | OUTPATIENT
Start: 2023-06-24 | End: 2023-06-26 | Stop reason: HOSPADM

## 2023-06-24 RX ORDER — MORPHINE SULFATE 2 MG/ML
2 INJECTION, SOLUTION INTRAMUSCULAR; INTRAVENOUS EVERY 4 HOURS PRN
Status: DISCONTINUED | OUTPATIENT
Start: 2023-06-24 | End: 2023-06-26 | Stop reason: HOSPADM

## 2023-06-24 RX ORDER — ALBUTEROL SULFATE 90 UG/1
2 AEROSOL, METERED RESPIRATORY (INHALATION) EVERY 6 HOURS PRN
Status: DISCONTINUED | OUTPATIENT
Start: 2023-06-24 | End: 2023-06-24

## 2023-06-24 RX ORDER — NITROGLYCERIN 0.3 MG/1
0.3 TABLET SUBLINGUAL EVERY 5 MIN PRN
COMMUNITY
Start: 2023-02-16 | End: 2024-02-16

## 2023-06-24 RX ORDER — MIRABEGRON 25 MG/1
25 TABLET, FILM COATED, EXTENDED RELEASE ORAL DAILY
COMMUNITY
Start: 2023-04-17

## 2023-06-24 RX ORDER — PANTOPRAZOLE SODIUM 40 MG/1
40 TABLET, DELAYED RELEASE ORAL
Status: DISCONTINUED | OUTPATIENT
Start: 2023-06-25 | End: 2023-06-26 | Stop reason: HOSPADM

## 2023-06-24 RX ORDER — PROCHLORPERAZINE EDISYLATE 5 MG/ML
5 INJECTION INTRAMUSCULAR; INTRAVENOUS EVERY 6 HOURS PRN
Status: DISCONTINUED | OUTPATIENT
Start: 2023-06-24 | End: 2023-06-26 | Stop reason: HOSPADM

## 2023-06-24 RX ORDER — CILOSTAZOL 100 MG/1
100 TABLET ORAL 2 TIMES DAILY
Status: DISCONTINUED | OUTPATIENT
Start: 2023-06-24 | End: 2023-06-26 | Stop reason: HOSPADM

## 2023-06-24 RX ORDER — METOPROLOL TARTRATE 50 MG/1
50 TABLET ORAL 2 TIMES DAILY
COMMUNITY
Start: 2023-05-17

## 2023-06-24 RX ORDER — IBUPROFEN 200 MG
16 TABLET ORAL
Status: DISCONTINUED | OUTPATIENT
Start: 2023-06-24 | End: 2023-06-26 | Stop reason: HOSPADM

## 2023-06-24 RX ORDER — METOPROLOL TARTRATE 50 MG/1
50 TABLET ORAL 2 TIMES DAILY
Status: DISCONTINUED | OUTPATIENT
Start: 2023-06-24 | End: 2023-06-26 | Stop reason: HOSPADM

## 2023-06-24 RX ADMIN — GABAPENTIN 300 MG: 300 CAPSULE ORAL at 09:06

## 2023-06-24 RX ADMIN — CILOSTAZOL 100 MG: 100 TABLET ORAL at 10:06

## 2023-06-24 RX ADMIN — INSULIN DETEMIR 20 UNITS: 100 INJECTION, SOLUTION SUBCUTANEOUS at 10:06

## 2023-06-24 RX ADMIN — METOPROLOL TARTRATE 50 MG: 50 TABLET, FILM COATED ORAL at 09:06

## 2023-06-24 RX ADMIN — SODIUM CHLORIDE: 0.9 INJECTION, SOLUTION INTRAVENOUS at 09:06

## 2023-06-24 NOTE — ED PROVIDER NOTES
Encounter Date: 6/24/2023       History     Chief Complaint   Patient presents with    Chest Pain     Reported chest pain 10/10 at home with room air sat of 88%. Currently 95% on room air. Chest pain 3/10 after nitro spray. 0/10 after second nitro spray.     Emergent evaluation of an 82-year-old female with history of obesity, COPD and asthma with ongoing tobacco use had been smoking 1 pack per day has decreased to half pack per day, hypertension, hyperlipidemia, type 2 diabetes, coronary artery disease status post angioplasty and three-vessel CABG, peripheral vascular disease, hypothyroidism presents to the ER due to acute onset of chest pain midsternal radiating to bilateral anterior chest that began at 1:32 p.m. patient reports pain was 10/10 and pressure-like.  She reports no shortness of breath weakness dizziness lightheadedness diaphoresis or nausea.  Her daughter reports she did become very pale.  Daughter gave her 4 baby aspirins which she chewed this did not change her pain.  EMS was activated she was given sublingual nitro they brought her pain from 10/10 to 3/10.  And 2nd dose of nitro brought her pain to 0/10.  EMS reported originally sats were 88% on room air but are currently % on room air with no shortness of breath  Daughter reports patient recently had a cardiac workup    Review of patient's allergies indicates:   Allergen Reactions    Tetracyclines Swelling     Past Medical History:   Diagnosis Date    Allergy     Anticoagulant long-term use     Diabetes mellitus     Diabetes mellitus, type 2     Disorder of kidney and ureter     Hyperlipidemia     Hypertension     Obesity     Peripheral vascular disease     Thyroid disease     Type 2 diabetes mellitus      Past Surgical History:   Procedure Laterality Date    ANGIOGRAPHY OF LOWER EXTREMITY N/A 12/11/2019    Procedure: Angiogram Extremity Unilateral;  Surgeon: Cruz Armas MD;  Location: [facility] CATH/EP LAB;  Service: Cardiology;   Laterality: N/A;    ANKLE HARDWARE REMOVAL Right 12/15/2022    Procedure: REMOVAL, HARDWARE, ANKLE;  Surgeon: Nae Jerry MD;  Location: [facility] OR;  Service: Orthopedics;  Laterality: Right;    CORONARY ARTERY BYPASS GRAFT      HYSTERECTOMY      partial    IRRIGATION AND DEBRIDEMENT OF LOWER EXTREMITY Right 12/15/2022    Procedure: IRRIGATION AND DEBRIDEMENT, LOWER EXTREMITY;  Surgeon: Nae Jerry MD;  Location: [facility] OR;  Service: Orthopedics;  Laterality: Right;    OPEN REDUCTION AND INTERNAL FIXATION (ORIF) OF INJURY OF ANKLE Right 7/20/2022    Procedure: ORIF, ANKLE- medial mallelous;  Surgeon: Nae Jerry MD;  Location: [facility] OR;  Service: Orthopedics;  Laterality: Right;    PERIPHERAL ANGIOGRAPHY  12/11/2019     Family History   Problem Relation Age of Onset    Alzheimer's disease Mother     Heart disease Father         MI    No Known Problems Sister     Diabetes Brother     No Known Problems Daughter     No Known Problems Son     No Known Problems Sister     No Known Problems Sister     No Known Problems Sister     No Known Problems Sister     Heart disease Brother     No Known Problems Brother     No Known Problems Son     No Known Problems Son      Social History     Tobacco Use    Smoking status: Every Day     Packs/day: 0.25     Years: 60.00     Pack years: 15.00     Types: Cigarettes    Smokeless tobacco: Never   Substance Use Topics    Alcohol use: No    Drug use: No     Review of Systems   Constitutional:  Negative for activity change, appetite change, chills, diaphoresis, fatigue and fever.   HENT:  Negative for congestion, postnasal drip, rhinorrhea and sore throat.    Respiratory:  Positive for cough. Negative for chest tightness, shortness of breath, wheezing and stridor.         Chronic smoker's cough per family   Cardiovascular:  Positive for chest pain. Negative for palpitations.   Gastrointestinal:  Negative for abdominal distention, abdominal pain, blood in stool, constipation,  diarrhea, nausea and vomiting.   Genitourinary:  Negative for flank pain.   Musculoskeletal:  Negative for arthralgias, back pain, myalgias, neck pain and neck stiffness.   Skin:  Positive for pallor. Negative for rash.   Neurological:  Negative for dizziness, syncope, weakness, light-headedness and headaches.   Hematological:  Does not bruise/bleed easily.   Psychiatric/Behavioral:  Negative for confusion. The patient is not nervous/anxious.    All other systems reviewed and are negative.    Physical Exam     Initial Vitals [06/24/23 1450]   BP Pulse Resp Temp SpO2   137/65 83 20 98.2 °F (36.8 °C) 95 %      MAP       --         Physical Exam    Nursing note and vitals reviewed.  Constitutional: She appears well-developed and well-nourished. She is not diaphoretic. No distress.   Obese   HENT:   Head: Normocephalic and atraumatic.   Right Ear: External ear normal.   Left Ear: External ear normal.   Nose: Nose normal.   Mouth/Throat: Oropharynx is clear and moist.   Eyes: Conjunctivae and EOM are normal. Pupils are equal, round, and reactive to light.   Neck: Neck supple. No tracheal deviation present.   Normal range of motion.  Cardiovascular:  Normal rate, regular rhythm, normal heart sounds and intact distal pulses.     Exam reveals no gallop and no friction rub.       No murmur heard.  Blood pressure 137/65 pulse 83   Pulmonary/Chest: Breath sounds normal. No stridor. No respiratory distress. She has no wheezes. She has no rhonchi. She has no rales. She exhibits no tenderness.   Sats 100% on room air clear breath sounds bilaterally   Abdominal: Abdomen is soft. Bowel sounds are normal. She exhibits no distension and no mass. There is no abdominal tenderness. There is no rebound and no guarding.   Musculoskeletal:         General: No edema. Normal range of motion.      Cervical back: Normal range of motion and neck supple.     Neurological: She is alert and oriented to person, place, and time. She has normal  strength. No cranial nerve deficit or sensory deficit.   Skin: Skin is warm and dry. No rash noted. No erythema. No pallor.   Psychiatric: She has a normal mood and affect. Her behavior is normal. Judgment and thought content normal.       ED Course   Procedures  Labs Reviewed   CBC W/ AUTO DIFFERENTIAL - Abnormal; Notable for the following components:       Result Value    RBC 3.90 (*)      (*)     MCH 33.3 (*)     All other components within normal limits   COMPREHENSIVE METABOLIC PANEL - Abnormal; Notable for the following components:    Glucose 140 (*)     Creatinine 2.4 (*)     Albumin 3.4 (*)     eGFR 19.9 (*)     All other components within normal limits   B-TYPE NATRIURETIC PEPTIDE   TROPONIN I HIGH SENSITIVITY   PROTIME-INR   MAGNESIUM   B-TYPE NATRIURETIC PEPTIDE   TROPONIN I HIGH SENSITIVITY     EKG Readings: (Independently Interpreted)   Initial Reading: No STEMI. Rhythm: Normal Sinus Rhythm. Heart Rate: 79. Conduction: RBBB (And first-degree AV block). Axis: Left Axis Deviation.   No change in EKG compared to December 8, 2022   ECG Results              EKG 12-lead (In process)  Result time 06/24/23 15:19:40      In process by Interface, Lab In [facility] (06/24/23 15:19:40)                   Narrative:    Test Reason : R07.9,    Vent. Rate : 079 BPM     Atrial Rate : 079 BPM     P-R Int : 210 ms          QRS Dur : 150 ms      QT Int : 448 ms       P-R-T Axes : 052 -60 047 degrees     QTc Int : 513 ms    Sinus rhythm with 1st degree A-V block  Left axis deviation  Right bundle branch block  Possible Lateral infarct ,age undetermined  Abnormal ECG  When compared with ECG of 08-DEC-2022 12:53,  Borderline criteria for Lateral infarct are now Present    Referred By: [email or web address]   SELF           Confirmed By:                                   Imaging Results              X-Ray Chest AP Portable (Final result)  Result time 06/24/23 15:50:54      Final result by Tomy Alfred MD (06/24/23 15:50:54)                    Narrative:    Chest single view    CLINICAL DATA: Chest pain    FINDINGS: AP view is compared to July 2022. Heart size is normal. There has been previous median sternotomy. The lungs are clear. No acute osseous abnormalities are identified.    IMPRESSION:  1. No acute radiographic abnormalities.    Electronically signed by:  Tomy Alfred MD  6/24/2023 3:50 PM CDT Workstation: [id number]                                  X-Rays:   Independently Interpreted Readings:   Chest X-Ray: Normal heart size.  No infiltrates.  No acute abnormalities.   Medications - No data to display  Medical Decision Making:   History:   Old Medical Records: I decided to obtain old medical records.  Old Records Summarized: records from previous admission(s).       <> Summary of Records: 6/14/23  Nuclear medicine stress test  The ECG portion of the study is negative for ischemia.  ·  The patient reported no chest pain during the stress test.  ·  There were no arrhythmias during stress.  ·  The nuclear portion of this study will be reported separately.     Echo 4/4/23  · The left ventricle is normal in size with normal systolic function.  · The estimated ejection fraction is 75%.  · Normal left ventricular diastolic function.  · Atrial fibrillation not observed.  · Normal right ventricular size with normal right ventricular systolic function.  · Mild tricuspid regurgitation.  · Normal central venous pressure (3 mmHg).  · The estimated PA systolic pressure is 18 mmHg.  · Mild aortic sclerosis    Independently Interpreted Test(s):   I have ordered and independently interpreted X-rays - see prior notes.  I have ordered and independently interpreted EKG Reading(s) - see prior notes  Clinical Tests:   Lab Tests: Ordered and Reviewed  The following lab test(s) were unremarkable: CBC and BNP       <> Summary of Lab: Creatinine 2.4 glucose 140    Troponin 7.2  Normal coags    Magnesium 2.1  Radiological Study: Ordered and  Reviewed  Medical Tests: Ordered and Reviewed  ED Management:  Emergent evaluation of an 82-year-old female with history of obesity, COPD and asthma with ongoing tobacco use had been smoking 1 pack per day has decreased to half pack per day, hypertension, hyperlipidemia, type 2 diabetes, coronary artery disease status post angioplasty and three-vessel CABG, peripheral vascular disease, hypothyroidism presents to the ER due to acute onset of chest pain midsternal radiating to bilateral anterior chest that began at 1:32 p.m. patient reports pain was 10/10 and pressure-like.  She reports no shortness of breath weakness dizziness lightheadedness diaphoresis or nausea.  Her daughter reports she did become very pale.  Daughter gave her 4 baby aspirins which she chewed this did not change her pain.  EMS was activated she was given sublingual nitro they brought her pain from 10/10 to 3/10.  And 2nd dose of nitro brought her pain to 0/10.  EMS reported originally sats were 88% on room air but are currently % on room air with no shortness of breath  Daughter reports patient recently had a cardiac workup  On physical exam vital signs are normal sats are currently % on room air patient is in no distress she originally was tearful but reports it was due to IV being established in the right arm.  Blood pressure 137/65 pulse 83 afebrile 98.2 respirations 20 clear breath sounds bilaterally patient once has a cough that is dry and daughter reports this is her chronic smoker's cough.  No reproducible chest pain soft nontender abdomen.  MDM    Patient presents for emergent evaluation of acute that poses a possible threat to life and/or bodily function.    Differential diagnosis includes but is not limited to MI including STEMI or NSTEMI, ACS with unstable angina or angina, PE, pulmonary edema CHF exacerbation COPD exacerbation pneumothorax pericardial effusion cardiac tamponade,  In the ED patient found to have acute chest  pain, pallor.   I ordered labs and personally reviewed them.  Labs significant for see above    I ordered X-rays and personally reviewed them and reviewed the radiologist interpretation.  Xray significant for see above.    I ordered EKG and personally reviewed it.  EKG significant for see above    Admission MDM  I discussed the patient presentation labs, ekg, X-rays, CT findings with the Hospitalist .  Troponin was negative pain is still resolved patient has a heart score of 5 will admit   Patient was managed in the ED with no medications at this time pain is 0/10.   The response to treatment was improved pain is currently 0/10  Patient required emergent consultation to hospitalist for admission.  Geneva Inman M.D.     Additional MDM:   Heart Score:    History:          Moderately suspicious.  ECG:             Normal  Age:               >65 years  Risk factors: >= 3 risk factors or history of atherosclerotic disease  Troponin:       Less than or equal to normal limit  Final Score: 5                       Clinical Impression:   Final diagnoses:  [R07.9] Chest pain (Primary)        ED Disposition Condition    Admit Stable                Geneva Inman MD  06/24/23 [id number]

## 2023-06-24 NOTE — SUBJECTIVE & OBJECTIVE
Past Medical History:   Diagnosis Date    Allergy     Anticoagulant long-term use     Diabetes mellitus     Diabetes mellitus, type 2     Disorder of kidney and ureter     Hyperlipidemia     Hypertension     Obesity     Peripheral vascular disease     Thyroid disease     Type 2 diabetes mellitus        Past Surgical History:   Procedure Laterality Date    ANGIOGRAPHY OF LOWER EXTREMITY N/A 12/11/2019    Procedure: Angiogram Extremity Unilateral;  Surgeon: Cruz Armas MD;  Location: [facility] CATH/EP LAB;  Service: Cardiology;  Laterality: N/A;    ANKLE HARDWARE REMOVAL Right 12/15/2022    Procedure: REMOVAL, HARDWARE, ANKLE;  Surgeon: Nae Jerry MD;  Location: [facility] OR;  Service: Orthopedics;  Laterality: Right;    CORONARY ARTERY BYPASS GRAFT      HYSTERECTOMY      partial    IRRIGATION AND DEBRIDEMENT OF LOWER EXTREMITY Right 12/15/2022    Procedure: IRRIGATION AND DEBRIDEMENT, LOWER EXTREMITY;  Surgeon: Nae Jerry MD;  Location: [facility] OR;  Service: Orthopedics;  Laterality: Right;    OPEN REDUCTION AND INTERNAL FIXATION (ORIF) OF INJURY OF ANKLE Right 7/20/2022    Procedure: ORIF, ANKLE- medial mallelous;  Surgeon: Nae Jerry MD;  Location: [facility] OR;  Service: Orthopedics;  Laterality: Right;    PERIPHERAL ANGIOGRAPHY  12/11/2019       Review of patient's allergies indicates:   Allergen Reactions    Tetracyclines Swelling       Current Facility-Administered Medications on File Prior to Encounter   Medication    gentian violet 1 % topical solution 0.5 mL     Current Outpatient Medications on File Prior to Encounter   Medication Sig    albuterol (PROVENTIL/VENTOLIN HFA) 90 mcg/actuation inhaler Inhale 2 puffs into the lungs every 6 (six) hours as needed.    amLODIPine (NORVASC) 5 MG tablet Take 5 mg by mouth once daily.    aspirin (ECOTRIN) 81 MG EC tablet Take 81 mg by mouth once daily.     cilostazoL (PLETAL) 100 MG Tab Take 100 mg by mouth 2 (two) times daily.    clopidogreL (PLAVIX) 75 mg tablet  "Take 75 mg by mouth once daily.    collagenase (SANTYL) ointment Apply topically once daily.    gabapentin (NEURONTIN) 300 MG capsule Take 300 mg by mouth 2 (two) times daily.     insulin glargine (LANTUS) 100 unit/mL injection Inject 35 Units into the skin once daily. And takes 20 units q pm    levothyroxine (SYNTHROID) 25 MCG tablet Take 25 mcg by mouth once daily.    lisinopriL 10 MG tablet Take 10 mg by mouth once daily.    metformin (GLUCOPHAGE) 500 MG tablet Take 500 mg by mouth 2 (two) times daily with meals.    metoprolol tartrate (LOPRESSOR) 50 MG tablet Take 50 mg by mouth 2 (two) times daily.    NOVOLIN R REGULAR U-100 INSULN 100 unit/mL injection Inject 6 Units into the skin 2 (two) times daily before meals.    pantoprazole (PROTONIX) 40 MG tablet Take 40 mg by mouth once daily.    rosuvastatin (CRESTOR) 40 MG Tab Take 40 mg by mouth once daily.    solifenacin (VESICARE) 10 MG tablet Take 10 mg by mouth once daily.    traMADoL (ULTRAM) 50 mg tablet Take 50 mg by mouth every 8 (eight) hours as needed.    triamterene-hydrochlorothiazide 37.5-25 mg (MAXZIDE-25) 37.5-25 mg per tablet Take 1 tablet by mouth once daily.     alcohol swabs PadM USE THREE TIMES DAILY  TO  CHECK BLOOD SUGAR    BD ALCOHOL SWABS PadM     DROPLET INSULIN SYRINGE 1 mL 31 gauge x 15/64" Syrg     HYDROcodone-acetaminophen (NORCO)  mg per tablet Take 1 tablet by mouth every 4 (four) hours as needed.    HYDROcodone-acetaminophen (NORCO)  mg per tablet Take 1 tablet by mouth every 4 (four) hours as needed (pain).    HYDROcodone-acetaminophen (NORCO)  mg per tablet Take 1 tablet by mouth every 4 (four) hours as needed for Pain.    insulin syringe-needle U-100 0.3 mL 30 gauge x 5/16" Syrg by Other route.    lisinopriL (PRINIVIL,ZESTRIL) 20 MG tablet Take 1 tablet (20 mg total) by mouth once daily. (Patient taking differently: Take 10 mg by mouth once daily.)    metoprolol succinate (TOPROL-XL) 50 MG 24 hr tablet Take 50 mg " by mouth 2 (two) times daily. 25 mg + 50 mg = 75 mg BID    MYRBETRIQ 25 mg Tb24 ER tablet Take 25 mg by mouth once daily.    nitroGLYCERIN (NITROSTAT) 0.3 MG SL tablet Place 0.3 mg under the tongue every 5 (five) minutes as needed.    TRUE METRIX GLUCOSE TEST STRIP Strp     TRUEPLUS LANCETS 30 gauge Misc     [DISCONTINUED] ciprofloxacin HCl (CIPRO) 500 MG tablet Take 500 mg by mouth 2 (two) times daily.     Family History       Problem Relation (Age of Onset)    Alzheimer's disease Mother    Diabetes Brother    Heart disease Father, Brother    No Known Problems Sister, Daughter, Son, Sister, Sister, Sister, Sister, Brother, Son, Son          Tobacco Use    Smoking status: Every Day     Packs/day: 0.50     Years: 60.00     Pack years: 30.00     Types: Cigarettes    Smokeless tobacco: Never   Substance and Sexual Activity    Alcohol use: No    Drug use: No    Sexual activity: Never     Review of Systems   Constitutional:  Negative for activity change, appetite change, chills, diaphoresis, fatigue, fever and unexpected weight change.   HENT:  Negative for congestion, dental problem, facial swelling, nosebleeds, sinus pressure, sinus pain, sore throat and trouble swallowing.    Eyes:  Negative for pain and redness.   Respiratory:  Positive for chest tightness. Negative for apnea, cough, shortness of breath and wheezing.    Cardiovascular:  Negative for chest pain, palpitations and leg swelling.   Gastrointestinal:  Negative for abdominal distention, abdominal pain, anal bleeding, blood in stool, constipation, diarrhea, nausea and vomiting.   Endocrine: Negative for polyphagia and polyuria.   Genitourinary:  Negative for decreased urine volume, difficulty urinating, dysuria, frequency, hematuria and urgency.   Musculoskeletal:  Negative for back pain, gait problem, joint swelling, myalgias, neck pain and neck stiffness.   Skin:  Positive for pallor. Negative for color change, rash and wound.   Neurological:  Negative for  dizziness, seizures, syncope, facial asymmetry, speech difficulty, weakness, light-headedness, numbness and headaches.   Psychiatric/Behavioral:  Negative for agitation, behavioral problems, confusion, hallucinations, sleep disturbance and suicidal ideas.    Objective:     Vital Signs (Most Recent):  Temp: 98.2 °F (36.8 °C) (06/24/23 1450)  Pulse: 80 (06/24/23 1600)  Resp: (!) 21 (06/24/23 1530)  BP: (!) 143/59 (06/24/23 1530)  SpO2: (!) 94 % (06/24/23 1600) Vital Signs (24h Range):  Temp:  [98.2 °F (36.8 °C)] 98.2 °F (36.8 °C)  Pulse:  [77-83] 80  Resp:  [20-21] 21  SpO2:  [94 %-99 %] 94 %  BP: (137-144)/(59-65) 143/59     Weight: 124.7 kg (275 lb)  Body mass index is 41.81 kg/m².     Physical Exam  Vitals reviewed.   Constitutional:       General: She is not in acute distress.     Appearance: Normal appearance. She is not ill-appearing, toxic-appearing or diaphoretic.   HENT:      Head: Normocephalic.      Right Ear: External ear normal.      Left Ear: External ear normal.      Nose: No congestion or rhinorrhea.      Mouth/Throat:      Mouth: Mucous membranes are moist.      Pharynx: Oropharynx is clear. No oropharyngeal exudate or posterior oropharyngeal erythema.   Eyes:      Extraocular Movements: Extraocular movements intact.      Conjunctiva/sclera: Conjunctivae normal.      Pupils: Pupils are equal, round, and reactive to light.   Cardiovascular:      Rate and Rhythm: Normal rate and regular rhythm.      Pulses: Normal pulses.      Heart sounds: Normal heart sounds.   Pulmonary:      Effort: Pulmonary effort is normal.      Breath sounds: Normal breath sounds.   Abdominal:      General: Abdomen is flat. Bowel sounds are normal.      Palpations: Abdomen is soft.   Genitourinary:     Rectum: Normal.   Musculoskeletal:         General: Normal range of motion.      Cervical back: Normal range of motion and neck supple.   Skin:     General: Skin is warm and dry.      Capillary Refill: Capillary refill takes less  than 2 seconds.   Neurological:      Mental Status: She is alert and oriented to person, place, and time.   Psychiatric:         Mood and Affect: Mood normal.            CRANIAL NERVES     CN III, IV, VI   Pupils are equal, round, and reactive to light.     Significant Labs: All pertinent labs within the past 24 hours have been reviewed.  Recent Lab Results         06/24/23  1549        Albumin 3.4       Alkaline Phosphatase 61       ALT 16       Anion Gap 8       AST 17       Baso # 0.03       Basophil % 0.4       BILIRUBIN TOTAL 0.7  Comment: For infants and newborns, interpretation of results should be based  on gestational age, weight and in agreement with clinical  observations.    Premature Infant recommended reference ranges:  Up to 24 hours.............<8.0 mg/dL  Up to 48 hours............<12.0 mg/dL  3-5 days..................<15.0 mg/dL  6-29 days.................<15.0 mg/dL         BNP 64  Comment: Values of less than 100 pg/ml are consistent with non-CHF populations.        64  Comment: Values of less than 100 pg/ml are consistent with non-CHF populations.       BUN 19       Calcium 9.2       Chloride 105       CO2 27       Creatinine 2.4       Differential Method Automated       eGFR 19.9       Eos # 0.1       Eosinophil % 1.7       Glucose 140       Gran # (ANC) 5.5       Gran % 71.2       Hematocrit 39.4       Hemoglobin 13.0       Immature Grans (Abs) 0.02  Comment: Mild elevation in immature granulocytes is non specific and   can be seen in a variety of conditions including stress response,   acute inflammation, trauma and pregnancy. Correlation with other   laboratory and clinical findings is essential.         Immature Granulocytes 0.3       INR 1.0  Comment: Coumadin Therapy:  2.0 - 3.0 for INR for all indicators except mechanical heart valves  and antiphospholipid syndromes which should use 2.5 - 3.5.         Lymph # 1.4       Lymph % 18.6       Magnesium 2.1       MCH 33.3       MCHC 33.0               Mono # 0.6       Mono % 7.8       MPV 9.4       nRBC 0       Platelets 232       Potassium 3.6       PROTEIN TOTAL 6.7       Protime 10.9       RBC 3.90       RDW 12.1       Sodium 140       Troponin I High Sensitivity 7.2  Comment: Troponin results differ between methods. Do not use   results between Troponin methods interchangeably.    Alkaline Phospatase levels above 400 U/L may   cause false positive results.    Access hsTnI should not be used for patients taking   Asfotase cain (Strensiq).         WBC 7.69               Significant Imaging: I have reviewed all pertinent imaging results/findings within the past 24 hours.

## 2023-06-25 ENCOUNTER — CLINICAL SUPPORT (OUTPATIENT)
Dept: CARDIOLOGY | Facility: HOSPITAL | Age: 83
End: 2023-06-25
Payer: MEDICARE

## 2023-06-25 VITALS — BODY MASS INDEX: 38.04 KG/M2 | WEIGHT: 251 LBS | HEIGHT: 68 IN

## 2023-06-25 LAB
ALBUMIN SERPL BCP-MCNC: 3.2 G/DL (ref 3.5–5.2)
ALP SERPL-CCNC: 58 U/L (ref 55–135)
ALT SERPL W/O P-5'-P-CCNC: 13 U/L (ref 10–44)
ANION GAP SERPL CALC-SCNC: 5 MMOL/L (ref 8–16)
AST SERPL-CCNC: 15 U/L (ref 10–40)
BASOPHILS # BLD AUTO: 0.03 K/UL (ref 0–0.2)
BASOPHILS NFR BLD: 0.4 % (ref 0–1.9)
BILIRUB SERPL-MCNC: 0.5 MG/DL (ref 0.1–1)
BSA FOR ECHO PROCEDURE: 2.34 M2
BUN SERPL-MCNC: 19 MG/DL (ref 8–23)
CALCIUM SERPL-MCNC: 9 MG/DL (ref 8.7–10.5)
CHLORIDE SERPL-SCNC: 109 MMOL/L (ref 95–110)
CO2 SERPL-SCNC: 28 MMOL/L (ref 23–29)
CREAT SERPL-MCNC: 2.1 MG/DL (ref 0.5–1.4)
CV ECHO LV RWT: 0.6 CM
DIFFERENTIAL METHOD: ABNORMAL
ECHO LV POSTERIOR WALL: 1.03 CM (ref 0.6–1.1)
EJECTION FRACTION: 55 %
EOSINOPHIL # BLD AUTO: 0.2 K/UL (ref 0–0.5)
EOSINOPHIL NFR BLD: 2.8 % (ref 0–8)
ERYTHROCYTE [DISTWIDTH] IN BLOOD BY AUTOMATED COUNT: 12 % (ref 11.5–14.5)
EST. GFR  (NO RACE VARIABLE): 23.4 ML/MIN/1.73 M^2
FRACTIONAL SHORTENING: 20 % (ref 28–44)
GLUCOSE SERPL-MCNC: 135 MG/DL (ref 70–110)
GLUCOSE SERPL-MCNC: 142 MG/DL (ref 70–110)
GLUCOSE SERPL-MCNC: 215 MG/DL (ref 70–110)
GLUCOSE SERPL-MCNC: 260 MG/DL (ref 70–110)
HCT VFR BLD AUTO: 39.6 % (ref 37–48.5)
HGB BLD-MCNC: 13.1 G/DL (ref 12–16)
IMM GRANULOCYTES # BLD AUTO: 0.02 K/UL (ref 0–0.04)
IMM GRANULOCYTES NFR BLD AUTO: 0.3 % (ref 0–0.5)
INTERVENTRICULAR SEPTUM: 1.21 CM (ref 0.6–1.1)
LEFT INTERNAL DIMENSION IN SYSTOLE: 2.76 CM (ref 2.1–4)
LEFT VENTRICLE DIASTOLIC VOLUME INDEX: 21.84 ML/M2
LEFT VENTRICLE DIASTOLIC VOLUME: 49.14 ML
LEFT VENTRICLE MASS INDEX: 53 G/M2
LEFT VENTRICLE SYSTOLIC VOLUME INDEX: 12.7 ML/M2
LEFT VENTRICLE SYSTOLIC VOLUME: 28.52 ML
LEFT VENTRICULAR INTERNAL DIMENSION IN DIASTOLE: 3.45 CM (ref 3.5–6)
LEFT VENTRICULAR MASS: 119.71 G
LYMPHOCYTES # BLD AUTO: 1.5 K/UL (ref 1–4.8)
LYMPHOCYTES NFR BLD: 22.6 % (ref 18–48)
MAGNESIUM SERPL-MCNC: 2.2 MG/DL (ref 1.6–2.6)
MCH RBC QN AUTO: 33.5 PG (ref 27–31)
MCHC RBC AUTO-ENTMCNC: 33.1 G/DL (ref 32–36)
MCV RBC AUTO: 101 FL (ref 82–98)
MONOCYTES # BLD AUTO: 0.6 K/UL (ref 0.3–1)
MONOCYTES NFR BLD: 9.3 % (ref 4–15)
NEUTROPHILS # BLD AUTO: 4.4 K/UL (ref 1.8–7.7)
NEUTROPHILS NFR BLD: 64.6 % (ref 38–73)
NRBC BLD-RTO: 0 /100 WBC
PLATELET # BLD AUTO: 246 K/UL (ref 150–450)
PMV BLD AUTO: 9.6 FL (ref 9.2–12.9)
POTASSIUM SERPL-SCNC: 3.5 MMOL/L (ref 3.5–5.1)
PROT SERPL-MCNC: 6.5 G/DL (ref 6–8.4)
RBC # BLD AUTO: 3.91 M/UL (ref 4–5.4)
SODIUM SERPL-SCNC: 142 MMOL/L (ref 136–145)
TROPONIN I SERPL HS-MCNC: 8.6 PG/ML (ref 0–14.9)
TSH SERPL DL<=0.005 MIU/L-ACNC: 1.02 UIU/ML (ref 0.34–5.6)
WBC # BLD AUTO: 6.76 K/UL (ref 3.9–12.7)

## 2023-06-25 PROCEDURE — 36415 COLL VENOUS BLD VENIPUNCTURE: CPT | Performed by: NURSE PRACTITIONER

## 2023-06-25 PROCEDURE — 25000003 PHARM REV CODE 250: Performed by: NURSE PRACTITIONER

## 2023-06-25 PROCEDURE — 25000003 PHARM REV CODE 250: Performed by: FAMILY MEDICINE

## 2023-06-25 PROCEDURE — 85025 COMPLETE CBC W/AUTO DIFF WBC: CPT | Performed by: FAMILY MEDICINE

## 2023-06-25 PROCEDURE — S4991 NICOTINE PATCH NONLEGEND: HCPCS | Performed by: FAMILY MEDICINE

## 2023-06-25 PROCEDURE — G0378 HOSPITAL OBSERVATION PER HR: HCPCS

## 2023-06-25 PROCEDURE — 93308 TTE F-UP OR LMTD: CPT | Mod: 26,,, | Performed by: SPECIALIST

## 2023-06-25 PROCEDURE — 84443 ASSAY THYROID STIM HORMONE: CPT | Performed by: NURSE PRACTITIONER

## 2023-06-25 PROCEDURE — 99900031 HC PATIENT EDUCATION (STAT)

## 2023-06-25 PROCEDURE — 83735 ASSAY OF MAGNESIUM: CPT | Performed by: NURSE PRACTITIONER

## 2023-06-25 PROCEDURE — 99214 PR OFFICE/OUTPT VISIT, EST, LEVL IV, 30-39 MIN: ICD-10-PCS | Mod: ,,, | Performed by: INTERNAL MEDICINE

## 2023-06-25 PROCEDURE — 99214 OFFICE O/P EST MOD 30 MIN: CPT | Mod: ,,, | Performed by: INTERNAL MEDICINE

## 2023-06-25 PROCEDURE — 99900035 HC TECH TIME PER 15 MIN (STAT)

## 2023-06-25 PROCEDURE — 36415 COLL VENOUS BLD VENIPUNCTURE: CPT | Performed by: FAMILY MEDICINE

## 2023-06-25 PROCEDURE — 80053 COMPREHEN METABOLIC PANEL: CPT | Performed by: FAMILY MEDICINE

## 2023-06-25 PROCEDURE — 82962 GLUCOSE BLOOD TEST: CPT

## 2023-06-25 PROCEDURE — 93308 ECHO (CUPID ONLY): ICD-10-PCS | Mod: 26,,, | Performed by: SPECIALIST

## 2023-06-25 PROCEDURE — 93308 TTE F-UP OR LMTD: CPT

## 2023-06-25 PROCEDURE — 84484 ASSAY OF TROPONIN QUANT: CPT | Performed by: NURSE PRACTITIONER

## 2023-06-25 PROCEDURE — 94761 N-INVAS EAR/PLS OXIMETRY MLT: CPT

## 2023-06-25 RX ORDER — ISOSORBIDE MONONITRATE 30 MG/1
30 TABLET, EXTENDED RELEASE ORAL DAILY
Status: DISCONTINUED | OUTPATIENT
Start: 2023-06-25 | End: 2023-06-26 | Stop reason: HOSPADM

## 2023-06-25 RX ADMIN — METOPROLOL TARTRATE 50 MG: 50 TABLET, FILM COATED ORAL at 09:06

## 2023-06-25 RX ADMIN — GABAPENTIN 300 MG: 300 CAPSULE ORAL at 09:06

## 2023-06-25 RX ADMIN — ISOSORBIDE MONONITRATE 30 MG: 30 TABLET, EXTENDED RELEASE ORAL at 01:06

## 2023-06-25 RX ADMIN — PANTOPRAZOLE SODIUM 40 MG: 40 TABLET, DELAYED RELEASE ORAL at 06:06

## 2023-06-25 RX ADMIN — INSULIN DETEMIR 20 UNITS: 100 INJECTION, SOLUTION SUBCUTANEOUS at 08:06

## 2023-06-25 RX ADMIN — GABAPENTIN 300 MG: 300 CAPSULE ORAL at 08:06

## 2023-06-25 RX ADMIN — ATORVASTATIN CALCIUM 80 MG: 40 TABLET, FILM COATED ORAL at 09:06

## 2023-06-25 RX ADMIN — CILOSTAZOL 100 MG: 100 TABLET ORAL at 08:06

## 2023-06-25 RX ADMIN — CLOPIDOGREL BISULFATE 75 MG: 75 TABLET, FILM COATED ORAL at 09:06

## 2023-06-25 RX ADMIN — LEVOTHYROXINE SODIUM 25 MCG: 0.03 TABLET ORAL at 06:06

## 2023-06-25 RX ADMIN — NICOTINE 1 PATCH: 21 PATCH, EXTENDED RELEASE TRANSDERMAL at 09:06

## 2023-06-25 RX ADMIN — ASPIRIN 81 MG: 81 TABLET, COATED ORAL at 09:06

## 2023-06-25 RX ADMIN — METOPROLOL TARTRATE 50 MG: 50 TABLET, FILM COATED ORAL at 08:06

## 2023-06-25 RX ADMIN — CILOSTAZOL 100 MG: 100 TABLET ORAL at 09:06

## 2023-06-25 RX ADMIN — AMLODIPINE BESYLATE 5 MG: 5 TABLET ORAL at 09:06

## 2023-06-25 RX ADMIN — OXYBUTYNIN CHLORIDE 10 MG: 5 TABLET, EXTENDED RELEASE ORAL at 09:06

## 2023-06-25 RX ADMIN — LISINOPRIL 10 MG: 10 TABLET ORAL at 09:06

## 2023-06-25 NOTE — ASSESSMENT & PLAN NOTE
Meghan   Initial troponin 7.2--> trend troponins x2  ekg prn  Limited echo  Will make Npo p MN  Consider cards consult if needed

## 2023-06-25 NOTE — PLAN OF CARE
Unable to meet with patient at bedside so spoke with Brenda Kent (Friend) [phone] (Mobile) on phone to complete assessment. Brenda is patient's next door neighbor and assists with her medical needs. She states patient does have a living will and patient's daughter Ivan is medical POA.     [facility]  Initial Discharge Assessment       Primary Care Provider: Emmy iPña MD    Admission Diagnosis: Chest pain [R07.9]    Admission Date: 6/24/2023  Expected Discharge Date: 6/25/2023    Transition of Care Barriers: (P) None    Payor: [facility] MEDICARE / Plan: [employer] MEDICARE HMO / Product Type: Capitation /     Extended Emergency Contact Information  Primary Emergency Contact: Brenda Kent  Mobile Phone: [phone]  Relation: Friend    Discharge Plan A: (P) Home  Discharge Plan B: (P) Home      [facility] [address] [address] [place]  [address] [address]  Phone: [phone] Fax: [phone]      Initial Assessment (most recent)       Adult Discharge Assessment - 06/25/23 1023          Discharge Assessment    Assessment Type Discharge Planning Assessment (P)      Confirmed/corrected address, phone number and insurance Yes (P)      Confirmed Demographics Correct on Facesheet (P)      Source of Information other (see comments) (P)    Brenda Kent (Friend)   [phone] (Mobile)    Does patient/caregiver understand observation status Yes (P)      Communicated NITZA with patient/caregiver No (P)      Reason For Admission chest pain (P)      People in Home alone (P)      Facility Arrived From: home (P)      Do you expect to return to your current living situation? Yes (P)      Do you have help at home or someone to help you manage your care at home? Yes (P)      Who are your caregiver(s) and their phone number(s)? Brenda Kent (Friend)   [phone] (Mobile) next door neighbor (P)      Current cognitive status: Unable to Assess (P)       Equipment Currently Used at Home none (P)      Readmission within 30 days? No (P)      Patient currently being followed by outpatient case management? No (P)      Do you currently have service(s) that help you manage your care at home? No (P)      Do you take prescription medications? Yes (P)      Do you have prescription coverage? Yes (P)      Coverage [employer] (P)      Who is going to help you get home at discharge? Brenda Kent (Friend)   [phone] (Mobile) (P)      How do you get to doctors appointments? family or friend will provide (P)      Are you on dialysis? No (P)      Do you take coumadin? No (P)      Discharge Plan A Home (P)      Discharge Plan B Home (P)      DME Needed Upon Discharge  none (P)      Discharge Plan discussed with: Friend (P)      Name(s) and Number(s) Brenda Kent (Friend)   [phone] (Mobile) (P)      Transition of Care Barriers None (P)

## 2023-06-25 NOTE — ASSESSMENT & PLAN NOTE
Initial creatinine 2.4, BUN 19, GFR 19  Hold NSAIDs/nephrotoxins medications  Strict I's and O's  Renal ultrasound  IV NS gentle hydration 75ml/hr  Recheck cmp am   Monitor cret closely if no improvement consider Nephro Consult

## 2023-06-25 NOTE — HPI
Patient is a 82 year old female with history of CABG x3, half pack-a-day smoker, COPD and asthma, HTN, hyperlipidemia, dm 2, PVD, hypothyroidism presents to the ER via EMS with acute onset mid sternal radiating chest pressure to bilateral anterior chest that began at 1:32 p.m. Reports pain 10/10 associated with paleness.  She denied shortness of breath, weakness dizziness, lightheadedness, diaphoresis or nausea.  Her daughter reports she did become very pale during chest pressure. She took 4 baby aspirins w/ no pain relief and given sublingual nitro x2 which relieved her pain of the 2nd nitro tablet.  She was evaluated in the ED with a creatinine level 2.4, BUN 19, GFR 19, initial troponin 7.2, EKG show bundle-branch block no ST elevation.    On assessment patient rates chest pressure 0/10.  She reports nitro helped with her pain.  Patient states was at home resting in a chair when she had immediate chest pressure the initially started midsternal and radiated to bilateral breasts area.  She reports last stress test 06/2023. She is followed by cardiology Dr. Lopez.  She denies dyspnea, feeling nauseous, vomiting, lightheaded, dizziness, fever, chills.  She reports smoking pack to a half pack a day, denies alcohol use, illicit drugs.    Hospitalist asked to admit for chest pain rule out, acute on chronic renal failure    Last stress test 06/2023  Conclusion     The ECG portion of the study is negative for ischemia.    The patient reported no chest pain during the stress test.    There were no arrhythmias during stress.    The nuclear portion of this study will be reported separately.    Last echo 4/2023 Summary  The left ventricle is normal in size with normal systolic function.  The estimated ejection fraction is 75%.  Normal left ventricular diastolic function.  Atrial fibrillation not observed.  Normal right ventricular size with normal right ventricular systolic function.  Mild tricuspid regurgitation.  Normal  central venous pressure (3 mmHg).  The estimated PA systolic pressure is 18 mmHg.  Mild aortic sclerosis

## 2023-06-25 NOTE — ASSESSMENT & PLAN NOTE
Smokes a pack to pack a encouraged smoking sensation  Patient has no desire to stop smoking history of COPD  Nicotine patch

## 2023-06-25 NOTE — PROGRESS NOTES
[facility] Medicine Progress Note  Patient Name: Mary Cynthia Cryer MRN: [id number]   Patient Class: OP- Observation  Length of Stay: 0   Admission Date: 6/24/2023  2:44 PM Attending Physician: Abdirashid Hemphill MD   Primary Care Provider: Emmy Piña MD Face-to-Face encounter date: 06/25/2023   Chief Complaint: Chest Pain (Reported chest pain 10/10 at home with room air sat of 88%. Currently 95% on room air. Chest pain 3/10 after nitro spray. 0/10 after second nitro spray.)      Subjective:    Interval History   Patient is doing well.    Denies chest pain, shortness of breath, palpitations, abdominal pain, nausea/vomiting.   No concerns/issues overnight reported by the patient or the nursing staff.  Reviewed the labs and discussed the plan of care.   No family present at bedside.     Review of Systems   All other Review of Systems were found to be negative expect for that mentioned already in HPI.     Hospital Course     06/25/2023     amLODIPine  5 mg Oral Daily    aspirin  81 mg Oral Daily    atorvastatin  80 mg Oral Daily    cilostazoL  100 mg Oral BID    clopidogreL  75 mg Oral Daily    gabapentin  300 mg Oral BID    insulin detemir U-100  20 Units Subcutaneous QHS    isosorbide mononitrate  30 mg Oral Daily    levothyroxine  25 mcg Oral Before breakfast    lisinopriL  10 mg Oral Daily    metoprolol tartrate  50 mg Oral BID    nicotine  1 patch Transdermal Daily    oxybutynin  10 mg Oral Daily    pantoprazole  40 mg Oral Before breakfast       acetaminophen, albuterol sulfate, dextrose 50%, dextrose 50%, glucagon (human recombinant), glucose, glucose, magnesium oxide, magnesium oxide, melatonin, morphine, nitroGLYCERIN, ondansetron, potassium bicarbonate, potassium bicarbonate, potassium bicarbonate, potassium, sodium phosphates, potassium, sodium phosphates, potassium, sodium phosphates, prochlorperazine, sodium chloride 0.9%      Objective:   Physical Exam  /61 (BP  "Location: Left arm, Patient Position: Lying)   Pulse 71   Temp 97.1 °F (36.2 °C) (Oral)   Resp 17   Ht 5' 8" (1.727 m)   Wt 114.1 kg (251 lb 8.7 oz)   SpO2 97%   BMI 38.25 kg/m²   Constitutional: No distress.   HENT: Atraumatic.   Cardiovascular: Normal rate, regular rhythm and normal heart sounds.   Pulmonary/Chest: Effort normal. Clear to auscultation bilaterally. No wheezes.   Abdominal: Soft. Bowel sounds are normal. Exhibits no distension and no mass. No tenderness  Neurological: Alert.   Skin: Skin is warm and dry.     Labs and Imaging    Significant Labs: All pertinent labs within the past 24 hours have been reviewed.    Significant Imaging: I have reviewed all pertinent imaging results/findings within the past 24 hours.    I have reviewed the Vitals, labs and imaging as above.     Assessment & Plan:   Mary Cynthia Cryer is a 82 y.o. female admitted for    Active Hospital Problems    Diagnosis    *Chest pain    Diabetes mellitus type 2 in obese    Acute renal failure superimposed on stage 3 chronic kidney disease    COPD (chronic obstructive pulmonary disease)    Hypertension    Tobacco abuse    Morbid obesity    Hypothyroidism       Plan  No more reoccurrence of chest pain  Started Imdur 30 mg   Continue IVF  If CP occurs cardiology will consider cath, but if not she can follow up with Dr. oLpez as an OP  Discharge tomorrow    Active PT: No  Active OT: No  Active SLP: No    Core measures:  - Code status:   - Diet: Cardiac  VTE Risk Mitigation (From admission, onward)           Ordered     IP VTE HIGH RISK PATIENT  Once         06/24/23 1840     Place sequential compression device  Until discontinued         06/24/23 1840                    Discharge Planning:   Discharge Planning   NITZA: 06/26    Code Status: Full Code   Is the patient medically ready for discharge?: no    Reason for patient still in hospital (select all that apply): Patient trending condition  Discharge Plan A: Home with assistance " from family        Above encounter included review of the medical records, interviewing and examining the patient face-to-face, discussion with family and other health care providers, ordering and interpreting lab/test results and formulating a plan of care.     Medical Decision Making:  [] Low Complexity  [x] Moderate Complexity  [] High Complexity    Abdirashid Hemphill MD  [facility] Hospitalist  06/25/2023

## 2023-06-25 NOTE — H&P
[facility] - Emergency Dept  Hospital Medicine  History & Physical    Patient Name: Mary Cynthia Cryer  MRN: [id number]  Patient Class: OP- Observation  Admission Date: 6/24/2023  Attending Physician: Regla De La Cruz MD   Primary Care Provider: Emmy Piña MD         Patient information was obtained from patient, relative(s) and ER records.     Subjective:     Principal Problem:Chest pain    Chief Complaint:   Chief Complaint   Patient presents with    Chest Pain     Reported chest pain 10/10 at home with room air sat of 88%. Currently 95% on room air. Chest pain 3/10 after nitro spray. 0/10 after second nitro spray.        HPI: Patient is a 82 year old female with history of CABG x3, half pack-a-day smoker, COPD and asthma, HTN, hyperlipidemia, dm 2, PVD, hypothyroidism presents to the ER via EMS with acute onset mid sternal radiating chest pressure to bilateral anterior chest that began at 1:32 p.m. Reports pain 10/10 associated with paleness.  She denied shortness of breath, weakness dizziness, lightheadedness, diaphoresis or nausea.  Her daughter reports she did become very pale during chest pressure. She took 4 baby aspirins w/ no pain relief and given sublingual nitro x2 which relieved her pain of the 2nd nitro tablet.  She was evaluated in the ED with a creatinine level 2.4, BUN 19, GFR 19, initial troponin 7.2, EKG show bundle-branch block no ST elevation.    On assessment patient rates chest pressure 0/10.  She reports nitro helped with her pain.  Patient states was at home resting in a chair when she had immediate chest pressure the initially started midsternal and radiated to bilateral breasts area.  She reports last stress test 06/2023. She is followed by cardiology Dr. Lopez.  She denies dyspnea, feeling nauseous, vomiting, lightheaded, dizziness, fever, chills.  She reports smoking pack to a half pack a day, denies alcohol use, illicit drugs.    Hospitalist asked to admit for  chest pain rule out, acute on chronic renal failure    Last stress test 06/2023  Conclusion     The ECG portion of the study is negative for ischemia.    The patient reported no chest pain during the stress test.    There were no arrhythmias during stress.    The nuclear portion of this study will be reported separately.      Past Medical History:   Diagnosis Date    Allergy     Anticoagulant long-term use     Diabetes mellitus     Diabetes mellitus, type 2     Disorder of kidney and ureter     Hyperlipidemia     Hypertension     Obesity     Peripheral vascular disease     Thyroid disease     Type 2 diabetes mellitus        Past Surgical History:   Procedure Laterality Date    ANGIOGRAPHY OF LOWER EXTREMITY N/A 12/11/2019    Procedure: Angiogram Extremity Unilateral;  Surgeon: Cruz Armas MD;  Location: [facility] CATH/EP LAB;  Service: Cardiology;  Laterality: N/A;    ANKLE HARDWARE REMOVAL Right 12/15/2022    Procedure: REMOVAL, HARDWARE, ANKLE;  Surgeon: Nae Jerry MD;  Location: [facility] OR;  Service: Orthopedics;  Laterality: Right;    CORONARY ARTERY BYPASS GRAFT      HYSTERECTOMY      partial    IRRIGATION AND DEBRIDEMENT OF LOWER EXTREMITY Right 12/15/2022    Procedure: IRRIGATION AND DEBRIDEMENT, LOWER EXTREMITY;  Surgeon: Nae Jerry MD;  Location: [facility] OR;  Service: Orthopedics;  Laterality: Right;    OPEN REDUCTION AND INTERNAL FIXATION (ORIF) OF INJURY OF ANKLE Right 7/20/2022    Procedure: ORIF, ANKLE- medial mallelous;  Surgeon: Nae Jerry MD;  Location: [facility] OR;  Service: Orthopedics;  Laterality: Right;    PERIPHERAL ANGIOGRAPHY  12/11/2019       Review of patient's allergies indicates:   Allergen Reactions    Tetracyclines Swelling       Current Facility-Administered Medications on File Prior to Encounter   Medication    gentian violet 1 % topical solution 0.5 mL     Current Outpatient Medications on File Prior to Encounter   Medication Sig    albuterol  "(PROVENTIL/VENTOLIN HFA) 90 mcg/actuation inhaler Inhale 2 puffs into the lungs every 6 (six) hours as needed.    amLODIPine (NORVASC) 5 MG tablet Take 5 mg by mouth once daily.    aspirin (ECOTRIN) 81 MG EC tablet Take 81 mg by mouth once daily.     cilostazoL (PLETAL) 100 MG Tab Take 100 mg by mouth 2 (two) times daily.    clopidogreL (PLAVIX) 75 mg tablet Take 75 mg by mouth once daily.    collagenase (SANTYL) ointment Apply topically once daily.    gabapentin (NEURONTIN) 300 MG capsule Take 300 mg by mouth 2 (two) times daily.     insulin glargine (LANTUS) 100 unit/mL injection Inject 35 Units into the skin once daily. And takes 20 units q pm    levothyroxine (SYNTHROID) 25 MCG tablet Take 25 mcg by mouth once daily.    lisinopriL 10 MG tablet Take 10 mg by mouth once daily.    metformin (GLUCOPHAGE) 500 MG tablet Take 500 mg by mouth 2 (two) times daily with meals.    metoprolol tartrate (LOPRESSOR) 50 MG tablet Take 50 mg by mouth 2 (two) times daily.    NOVOLIN R REGULAR U-100 INSULN 100 unit/mL injection Inject 6 Units into the skin 2 (two) times daily before meals.    pantoprazole (PROTONIX) 40 MG tablet Take 40 mg by mouth once daily.    rosuvastatin (CRESTOR) 40 MG Tab Take 40 mg by mouth once daily.    solifenacin (VESICARE) 10 MG tablet Take 10 mg by mouth once daily.    traMADoL (ULTRAM) 50 mg tablet Take 50 mg by mouth every 8 (eight) hours as needed.    triamterene-hydrochlorothiazide 37.5-25 mg (MAXZIDE-25) 37.5-25 mg per tablet Take 1 tablet by mouth once daily.     alcohol swabs PadM USE THREE TIMES DAILY  TO  CHECK BLOOD SUGAR    BD ALCOHOL SWABS PadM     DROPLET INSULIN SYRINGE 1 mL 31 gauge x 15/64" Syrg     HYDROcodone-acetaminophen (NORCO)  mg per tablet Take 1 tablet by mouth every 4 (four) hours as needed.    HYDROcodone-acetaminophen (NORCO)  mg per tablet Take 1 tablet by mouth every 4 (four) hours as needed (pain).    HYDROcodone-acetaminophen (NORCO) " " mg per tablet Take 1 tablet by mouth every 4 (four) hours as needed for Pain.    insulin syringe-needle U-100 0.3 mL 30 gauge x 5/16" Syrg by Other route.    lisinopriL (PRINIVIL,ZESTRIL) 20 MG tablet Take 1 tablet (20 mg total) by mouth once daily. (Patient taking differently: Take 10 mg by mouth once daily.)    metoprolol succinate (TOPROL-XL) 50 MG 24 hr tablet Take 50 mg by mouth 2 (two) times daily. 25 mg + 50 mg = 75 mg BID    MYRBETRIQ 25 mg Tb24 ER tablet Take 25 mg by mouth once daily.    nitroGLYCERIN (NITROSTAT) 0.3 MG SL tablet Place 0.3 mg under the tongue every 5 (five) minutes as needed.    TRUE METRIX GLUCOSE TEST STRIP Strp     TRUEPLUS LANCETS 30 gauge Misc     [DISCONTINUED] ciprofloxacin HCl (CIPRO) 500 MG tablet Take 500 mg by mouth 2 (two) times daily.     Family History       Problem Relation (Age of Onset)    Alzheimer's disease Mother    Diabetes Brother    Heart disease Father, Brother    No Known Problems Sister, Daughter, Son, Sister, Sister, Sister, Sister, Brother, Son, Son          Tobacco Use    Smoking status: Every Day     Packs/day: 0.50     Years: 60.00     Pack years: 30.00     Types: Cigarettes    Smokeless tobacco: Never   Substance and Sexual Activity    Alcohol use: No    Drug use: No    Sexual activity: Never     Review of Systems   Constitutional:  Negative for activity change, appetite change, chills, diaphoresis, fatigue, fever and unexpected weight change.   HENT:  Negative for congestion, dental problem, facial swelling, nosebleeds, sinus pressure, sinus pain, sore throat and trouble swallowing.    Eyes:  Negative for pain and redness.   Respiratory:  Positive for chest tightness. Negative for apnea, cough, shortness of breath and wheezing.    Cardiovascular:  Negative for chest pain, palpitations and leg swelling.   Gastrointestinal:  Negative for abdominal distention, abdominal pain, anal bleeding, blood in stool, constipation, diarrhea, nausea and " vomiting.   Endocrine: Negative for polyphagia and polyuria.   Genitourinary:  Negative for decreased urine volume, difficulty urinating, dysuria, frequency, hematuria and urgency.   Musculoskeletal:  Negative for back pain, gait problem, joint swelling, myalgias, neck pain and neck stiffness.   Skin:  Positive for pallor. Negative for color change, rash and wound.   Neurological:  Negative for dizziness, seizures, syncope, facial asymmetry, speech difficulty, weakness, light-headedness, numbness and headaches.   Psychiatric/Behavioral:  Negative for agitation, behavioral problems, confusion, hallucinations, sleep disturbance and suicidal ideas.    Objective:     Vital Signs (Most Recent):  Temp: 98.2 °F (36.8 °C) (06/24/23 1450)  Pulse: 80 (06/24/23 1600)  Resp: (!) 21 (06/24/23 1530)  BP: (!) 143/59 (06/24/23 1530)  SpO2: (!) 94 % (06/24/23 1600) Vital Signs (24h Range):  Temp:  [98.2 °F (36.8 °C)] 98.2 °F (36.8 °C)  Pulse:  [77-83] 80  Resp:  [20-21] 21  SpO2:  [94 %-99 %] 94 %  BP: (137-144)/(59-65) 143/59     Weight: 124.7 kg (275 lb)  Body mass index is 41.81 kg/m².     Physical Exam  Vitals reviewed.   Constitutional:       General: She is not in acute distress.     Appearance: Normal appearance. She is not ill-appearing, toxic-appearing or diaphoretic.   HENT:      Head: Normocephalic.      Right Ear: External ear normal.      Left Ear: External ear normal.      Nose: No congestion or rhinorrhea.      Mouth/Throat:      Mouth: Mucous membranes are moist.      Pharynx: Oropharynx is clear. No oropharyngeal exudate or posterior oropharyngeal erythema.   Eyes:      Extraocular Movements: Extraocular movements intact.      Conjunctiva/sclera: Conjunctivae normal.      Pupils: Pupils are equal, round, and reactive to light.   Cardiovascular:      Rate and Rhythm: Normal rate and regular rhythm.      Pulses: Normal pulses.      Heart sounds: Normal heart sounds.   Pulmonary:      Effort: Pulmonary effort is  normal.      Breath sounds: Normal breath sounds.   Abdominal:      General: Abdomen is flat. Bowel sounds are normal.      Palpations: Abdomen is soft.   Genitourinary:     Rectum: Normal.   Musculoskeletal:         General: Normal range of motion.      Cervical back: Normal range of motion and neck supple.   Skin:     General: Skin is warm and dry.      Capillary Refill: Capillary refill takes less than 2 seconds.   Neurological:      Mental Status: She is alert and oriented to person, place, and time.   Psychiatric:         Mood and Affect: Mood normal.            CRANIAL NERVES     CN III, IV, VI   Pupils are equal, round, and reactive to light.     Significant Labs: All pertinent labs within the past 24 hours have been reviewed.  Recent Lab Results         06/24/23  1549        Albumin 3.4       Alkaline Phosphatase 61       ALT 16       Anion Gap 8       AST 17       Baso # 0.03       Basophil % 0.4       BILIRUBIN TOTAL 0.7  Comment: For infants and newborns, interpretation of results should be based  on gestational age, weight and in agreement with clinical  observations.    Premature Infant recommended reference ranges:  Up to 24 hours.............<8.0 mg/dL  Up to 48 hours............<12.0 mg/dL  3-5 days..................<15.0 mg/dL  6-29 days.................<15.0 mg/dL         BNP 64  Comment: Values of less than 100 pg/ml are consistent with non-CHF populations.        64  Comment: Values of less than 100 pg/ml are consistent with non-CHF populations.       BUN 19       Calcium 9.2       Chloride 105       CO2 27       Creatinine 2.4       Differential Method Automated       eGFR 19.9       Eos # 0.1       Eosinophil % 1.7       Glucose 140       Gran # (ANC) 5.5       Gran % 71.2       Hematocrit 39.4       Hemoglobin 13.0       Immature Grans (Abs) 0.02  Comment: Mild elevation in immature granulocytes is non specific and   can be seen in a variety of conditions including stress response,   acute  inflammation, trauma and pregnancy. Correlation with other   laboratory and clinical findings is essential.         Immature Granulocytes 0.3       INR 1.0  Comment: Coumadin Therapy:  2.0 - 3.0 for INR for all indicators except mechanical heart valves  and antiphospholipid syndromes which should use 2.5 - 3.5.         Lymph # 1.4       Lymph % 18.6       Magnesium 2.1       MCH 33.3       MCHC 33.0              Mono # 0.6       Mono % 7.8       MPV 9.4       nRBC 0       Platelets 232       Potassium 3.6       PROTEIN TOTAL 6.7       Protime 10.9       RBC 3.90       RDW 12.1       Sodium 140       Troponin I High Sensitivity 7.2  Comment: Troponin results differ between methods. Do not use   results between Troponin methods interchangeably.    Alkaline Phospatase levels above 400 U/L may   cause false positive results.    Access hsTnI should not be used for patients taking   Asfotase cain (Strensiq).         WBC 7.69               Significant Imaging: I have reviewed all pertinent imaging results/findings within the past 24 hours.    Assessment/Plan:     * Chest pain  Meghan   Initial troponin 7.2--> trend troponins x2  ekg prn  Consider cards consult if needed  Will make Npo p MN      COPD (chronic obstructive pulmonary disease)  No exacerbation/stable not requiring any oxygen  Breathing treatments p.r.n.  Continue home breathing treatment  Encouraged smoking cessation    Acute renal failure superimposed on stage 3 chronic kidney disease  Initial creatinine 2.4, BUN 19, GFR 19  Hold NSAIDs/nephrotoxins medications  Strict I's and O's  Renal ultrasound  IV NS gentle hydration 75ml/hr  Recheck cmp am   Monitor cret closely if no improvement consider Nephro Consult    Diabetes mellitus type 2 in obese  Patient's FSGs are controlled on current medication regimen.  Last A1c reviewed-   Lab Results   Component Value Date    HGBA1C 6.3 (H) 12/08/2022     Most recent fingerstick glucose reviewed- No results for  input(s): POCTGLUCOSE in the last 24 hours.  Current correctional scale  Low  Maintain anti-hyperglycemic dose as follows-   Antihyperglycemics (From admission, onward)    None      poct/SSI  Hold Oral hypoglycemics while patient is in the hospital.    Tobacco abuse  Smokes a pack to pack a encouraged smoking sensation  Patient has no desire to stop smoking history of COPD  Nicotine patch      Hypertension  Stable/chronic  Monitor vital signs q.4  Continue home medications      Hypothyroidism  Continue home medication  TSH      Morbid obesity  Body mass index is 41.81 kg/m². Morbid obesity complicates all aspects of disease management from diagnostic modalities to treatment. Weight loss encouraged and health benefits explained to patient.     Nutritional consult      VTE Risk Mitigation (From admission, onward)         Ordered     IP VTE HIGH RISK PATIENT  Once         06/24/23 1840     Place sequential compression device  Until discontinued         06/24/23 1840                     On 06/24/2023, patient should be placed in hospital observation services under my care in collaboration with SANTIAGO Price  Department of Hospital Medicine  [facility] - Emergency Dept

## 2023-06-25 NOTE — PLAN OF CARE
Problem: Adult Inpatient Plan of Care  Goal: Plan of Care Review  Outcome: Ongoing, Progressing  Goal: Patient-Specific Goal (Individualized)  Outcome: Ongoing, Progressing  Goal: Absence of Hospital-Acquired Illness or Injury  Outcome: Ongoing, Progressing  Goal: Optimal Comfort and Wellbeing  Outcome: Ongoing, Progressing  Goal: Readiness for Transition of Care  Outcome: Ongoing, Progressing     Problem: Bariatric Environmental Safety  Goal: Safety Maintained with Care  Outcome: Ongoing, Progressing     Problem: Fluid and Electrolyte Imbalance (Acute Kidney Injury/Impairment)  Goal: Fluid and Electrolyte Balance  Outcome: Ongoing, Progressing     Problem: Oral Intake Inadequate (Acute Kidney Injury/Impairment)  Goal: Optimal Nutrition Intake  Outcome: Ongoing, Progressing     Problem: Renal Function Impairment (Acute Kidney Injury/Impairment)  Goal: Effective Renal Function  Outcome: Ongoing, Progressing     Problem: Diabetes Comorbidity  Goal: Blood Glucose Level Within Targeted Range  Outcome: Ongoing, Progressing

## 2023-06-25 NOTE — PROGRESS NOTES
Automatic Inhaler to Nebulizer Interchange    albuterol (Ventolin, ProAir, or Proventil)  mcg given multiple times per day changed to albuterol 2.5 mg Q6H PRN Wheezing, Shortness of Breath  per [facility] Automatic Therapeutic Substitutions Protocol.    Please contact pharmacy at extension [phone] with any questions.     Thank you,   Efraín Black

## 2023-06-25 NOTE — NURSING
Nurses Note -- 4 Eyes      6/24/2023   11:55 PM      Skin assessed during: Admit      [x] No Altered Skin Integrity Present    []Prevention Measures Documented      [] Yes- Altered Skin Integrity Present or Discovered   [] LDA Added if Not in Epic (Describe Wound)   [] New Altered Skin Integrity was Present on Admit and Documented in LDA   [] Wound Image Taken    Wound Care Consulted? No    Attending Nurse:  Sun Pelaez LPN     Second RN/Staff Member:  [id number]

## 2023-06-25 NOTE — NURSING
Patient had a short V-Tach episode @0357 this am patient was asleep. She quickly returned back to normal sinus rhythm   Notified Attending provider GLORY De La Cruz MD

## 2023-06-25 NOTE — PLAN OF CARE
Unable to meet with patient so spoke with  Brenda Kent (Friend) [phone] (Mobile) on phone and explained Medicare Outpatient Observation Notice (MOON). MOON form scanned into media manager.       06/25/23 1021   FUNEZ Message   Medicare Outpatient and Observation Notification regarding financial responsibility Explained to patient/caregiver   Date FUNEZ was signed 06/25/23   Time FUNEZ was signed [id number]

## 2023-06-25 NOTE — ASSESSMENT & PLAN NOTE
No exacerbation/stable not requiring any oxygen  Breathing treatments p.r.n.  Continue home breathing treatment  Encouraged smoking cessation

## 2023-06-25 NOTE — CONSULTS
[facility]  Department of Cardiology  Consult Note      PATIENT NAME: Mary Cynthia Cryer  MRN: [id number]  TODAY'S DATE: 06/25/2023  ADMIT DATE: 6/24/2023                          CONSULT REQUESTED BY: Abdirashid Hemphill MD    SUBJECTIVE     PRINCIPAL PROBLEM: Chest pain      REASON FOR CONSULT:  Chest Pain      HPI:    82 year old female patient known to Dr. Lopez has been in her normal state of health. PMH significant tof CAD and CABG in 1995 no further issues. Stress test recently negative which was done because was time and not because of symptoms she tells us. She started having cp similar to prior to her bypass and it did not go away with her inhaler or tums so she came to er it went away in EMS with nitro. Trop is negative. Currently CP free. Currently YULY getting IVF.    FROM H AND P     Principal Problem:Chest pain     Chief Complaint:        Chief Complaint   Patient presents with    Chest Pain       Reported chest pain 10/10 at home with room air sat of 88%. Currently 95% on room air. Chest pain 3/10 after nitro spray. 0/10 after second nitro spray.         HPI: Patient is a 82 year old female with history of CABG x3, half pack-a-day smoker, COPD and asthma, HTN, hyperlipidemia, dm 2, PVD, hypothyroidism presents to the ER via EMS with acute onset mid sternal radiating chest pressure to bilateral anterior chest that began at 1:32 p.m. Reports pain 10/10 associated with paleness.  She denied shortness of breath, weakness dizziness, lightheadedness, diaphoresis or nausea.  Her daughter reports she did become very pale during chest pressure. She took 4 baby aspirins w/ no pain relief and given sublingual nitro x2 which relieved her pain of the 2nd nitro tablet.  She was evaluated in the ED with a creatinine level 2.4, BUN 19, GFR 19, initial troponin 7.2, EKG show bundle-branch block no ST elevation.     On assessment patient rates chest pressure 0/10.  She reports nitro helped with her pain.   Patient states was at home resting in a chair when she had immediate chest pressure the initially started midsternal and radiated to bilateral breasts area.  She reports last stress test 06/2023. She is followed by cardiology Dr. Lopez.  She denies dyspnea, feeling nauseous, vomiting, lightheaded, dizziness, fever, chills.  She reports smoking pack to a half pack a day, denies alcohol use, illicit drugs.     Hospitalist asked to admit for chest pain rule out, acute on chronic renal failure     Last stress test 06/2023  Conclusion     The ECG portion of the study is negative for ischemia.    The patient reported no chest pain during the stress test.    There were no arrhythmias during stress.    The nuclear portion of this study will be reported separately.     Last echo 4/2023 Summary  The left ventricle is normal in size with normal systolic function.  The estimated ejection fraction is 75%.  Normal left ventricular diastolic function.  Atrial fibrillation not observed.  Normal right ventricular size with normal right ventricular systolic function.  Mild tricuspid regurgitation.  Normal central venous pressure (3 mmHg).  The estimated PA systolic pressure is 18 mmHg.  Mild aortic sclerosis           Review of patient's allergies indicates:   Allergen Reactions    Tetracyclines Swelling       Past Medical History:   Diagnosis Date    Allergy     Anticoagulant long-term use     Diabetes mellitus     Diabetes mellitus, type 2     Disorder of kidney and ureter     Hyperlipidemia     Hypertension     Obesity     Peripheral vascular disease     Thyroid disease     Type 2 diabetes mellitus      Past Surgical History:   Procedure Laterality Date    ANGIOGRAPHY OF LOWER EXTREMITY N/A 12/11/2019    Procedure: Angiogram Extremity Unilateral;  Surgeon: Cruz Armas MD;  Location: [facility] CATH/EP LAB;  Service: Cardiology;  Laterality: N/A;    ANKLE HARDWARE REMOVAL Right 12/15/2022    Procedure: REMOVAL, HARDWARE, ANKLE;   Surgeon: Nae Jerry MD;  Location: [facility] OR;  Service: Orthopedics;  Laterality: Right;    CORONARY ARTERY BYPASS GRAFT      HYSTERECTOMY      partial    IRRIGATION AND DEBRIDEMENT OF LOWER EXTREMITY Right 12/15/2022    Procedure: IRRIGATION AND DEBRIDEMENT, LOWER EXTREMITY;  Surgeon: Nae Jerry MD;  Location: [facility] OR;  Service: Orthopedics;  Laterality: Right;    OPEN REDUCTION AND INTERNAL FIXATION (ORIF) OF INJURY OF ANKLE Right 7/20/2022    Procedure: ORIF, ANKLE- medial mallelous;  Surgeon: Nae Jerry MD;  Location: [facility] OR;  Service: Orthopedics;  Laterality: Right;    PERIPHERAL ANGIOGRAPHY  12/11/2019     Social History     Tobacco Use    Smoking status: Every Day     Packs/day: 0.50     Years: 60.00     Pack years: 30.00     Types: Cigarettes    Smokeless tobacco: Never   Substance Use Topics    Alcohol use: No    Drug use: No        REVIEW OF SYSTEMS  +CP PRIOR TO ARRIVAL    OBJECTIVE     VITAL SIGNS (Most Recent)  Temp: 97.1 °F (36.2 °C) (06/25/23 0900)  Pulse: 71 (06/25/23 0900)  Resp: 17 (06/25/23 0900)  BP: 132/61 (06/25/23 0900)  SpO2: 97 % (06/25/23 0900)    VENTILATION STATUS  Resp: 17 (06/25/23 0900)  SpO2: 97 % (06/25/23 0900)           I & O (Last 24H):No intake or output data in the 24 hours ending 06/25/23 0931    WEIGHTS  Wt Readings from Last 3 Encounters:   06/25/23 0402 114.1 kg (251 lb 8.7 oz)   06/24/23 2231 115.3 kg (254 lb 3.1 oz)   06/24/23 2033 115.3 kg (254 lb 3.1 oz)   06/24/23 1450 124.7 kg (275 lb)   05/09/23 0900 122.5 kg (270 lb)   01/18/23 1053 122.5 kg (270 lb)       PHYSICAL EXAM  GENERAL: well built, well nourished, well-developed in no apparent distress alert and oriented.   HEENT: Normocephalic.   NECK: No JVD. No bruit..   CARDIAC: Regular rate and rhythm. S1 is normal.S2 is normal.No gallops, clicks or murmurs noted at this time.  CHEST ANATOMY: normal.   LUNGS: Clear to auscultation. No wheezing or rhonchi..   ABDOMEN: Soft.   URINARY: No arrieta catheter    EXTREMITIES: No cyanosis, clubbing or edema noted at this time.,   CENTRAL NERVOUS SYSTEM: No focal motor or sensory deficits noted.   SKIN: Skin without lesions, moist, well perfused.   MUSCLE STRENGTH & TONE: No noteable weakness, atrophy or abnormal movement.     HOME MEDICATIONS:  No current facility-administered medications on file prior to encounter.     Current Outpatient Medications on File Prior to Encounter   Medication Sig Dispense Refill    albuterol (PROVENTIL/VENTOLIN HFA) 90 mcg/actuation inhaler Inhale 2 puffs into the lungs every 6 (six) hours as needed.      amLODIPine (NORVASC) 5 MG tablet Take 5 mg by mouth once daily.      aspirin (ECOTRIN) 81 MG EC tablet Take 81 mg by mouth once daily.       cilostazoL (PLETAL) 100 MG Tab Take 100 mg by mouth 2 (two) times daily.      clopidogreL (PLAVIX) 75 mg tablet Take 75 mg by mouth once daily.      collagenase (SANTYL) ointment Apply topically once daily. 30 g 1    gabapentin (NEURONTIN) 300 MG capsule Take 300 mg by mouth 2 (two) times daily.       insulin glargine (LANTUS) 100 unit/mL injection Inject 35 Units into the skin once daily. And takes 20 units q pm      levothyroxine (SYNTHROID) 25 MCG tablet Take 25 mcg by mouth once daily.      lisinopriL 10 MG tablet Take 10 mg by mouth once daily.      metformin (GLUCOPHAGE) 500 MG tablet Take 500 mg by mouth 2 (two) times daily with meals.      metoprolol tartrate (LOPRESSOR) 50 MG tablet Take 50 mg by mouth 2 (two) times daily.      NOVOLIN R REGULAR U-100 INSULN 100 unit/mL injection Inject 6 Units into the skin 2 (two) times daily before meals.      pantoprazole (PROTONIX) 40 MG tablet Take 40 mg by mouth once daily.      rosuvastatin (CRESTOR) 40 MG Tab Take 40 mg by mouth once daily.      solifenacin (VESICARE) 10 MG tablet Take 10 mg by mouth once daily.      traMADoL (ULTRAM) 50 mg tablet Take 50 mg by mouth every 8 (eight) hours as needed.      triamterene-hydrochlorothiazide 37.5-25 mg  "(MAXZIDE-25) 37.5-25 mg per tablet Take 1 tablet by mouth once daily.       alcohol swabs PadM USE THREE TIMES DAILY  TO  CHECK BLOOD SUGAR      BD ALCOHOL SWABS PadM       DROPLET INSULIN SYRINGE 1 mL 31 gauge x 15/64" Syrg       HYDROcodone-acetaminophen (NORCO)  mg per tablet Take 1 tablet by mouth every 4 (four) hours as needed. 40 tablet 0    HYDROcodone-acetaminophen (NORCO)  mg per tablet Take 1 tablet by mouth every 4 (four) hours as needed (pain). 25 tablet 0    HYDROcodone-acetaminophen (NORCO)  mg per tablet Take 1 tablet by mouth every 4 (four) hours as needed for Pain. 25 tablet 0    insulin syringe-needle U-100 0.3 mL 30 gauge x 5/16" Syrg by Other route.      lisinopriL (PRINIVIL,ZESTRIL) 20 MG tablet Take 1 tablet (20 mg total) by mouth once daily. (Patient taking differently: Take 10 mg by mouth once daily.)      metoprolol succinate (TOPROL-XL) 50 MG 24 hr tablet Take 50 mg by mouth 2 (two) times daily. 25 mg + 50 mg = 75 mg BID      MYRBETRIQ 25 mg Tb24 ER tablet Take 25 mg by mouth once daily.      nitroGLYCERIN (NITROSTAT) 0.3 MG SL tablet Place 0.3 mg under the tongue every 5 (five) minutes as needed.      TRUE METRIX GLUCOSE TEST STRIP Strp       TRUEPLUS LANCETS 30 gauge Misc          SCHEDULED MEDS:   amLODIPine  5 mg Oral Daily    aspirin  81 mg Oral Daily    atorvastatin  80 mg Oral Daily    cilostazoL  100 mg Oral BID    clopidogreL  75 mg Oral Daily    gabapentin  300 mg Oral BID    insulin detemir U-100  20 Units Subcutaneous QHS    levothyroxine  25 mcg Oral Before breakfast    lisinopriL  10 mg Oral Daily    metoprolol tartrate  50 mg Oral BID    nicotine  1 patch Transdermal Daily    oxybutynin  10 mg Oral Daily    pantoprazole  40 mg Oral Before breakfast       CONTINUOUS INFUSIONS:   sodium chloride 0.9% 75 mL/hr at 06/24/23 2140       PRN MEDS:acetaminophen, albuterol sulfate, dextrose 50%, dextrose 50%, glucagon (human recombinant), glucose, glucose, magnesium " oxide, magnesium oxide, melatonin, morphine, nitroGLYCERIN, ondansetron, potassium bicarbonate, potassium bicarbonate, potassium bicarbonate, potassium, sodium phosphates, potassium, sodium phosphates, potassium, sodium phosphates, prochlorperazine, sodium chloride 0.9%    LABS AND DIAGNOSTICS     CBC LAST 3 DAYS  Recent Labs   Lab 06/24/23  1549 06/25/23  0740   WBC 7.69 6.76   RBC 3.90* 3.91*   HGB 13.0 13.1   HCT 39.4 39.6   * 101*   MCH 33.3* 33.5*   MCHC 33.0 33.1   RDW 12.1 12.0    246   MPV 9.4 9.6   GRAN 71.2  5.5 64.6  4.4   LYMPH 18.6  1.4 22.6  1.5   MONO 7.8  0.6 9.3  0.6   BASO 0.03 0.03   NRBC 0 0       COAGULATION LAST 3 DAYS  Recent Labs   Lab 06/24/23  1549   INR 1.0       CHEMISTRY LAST 3 DAYS  Recent Labs   Lab 06/24/23  1549 06/25/23  0740    142   K 3.6 3.5    109   CO2 27 28   ANIONGAP 8 5*   BUN 19 19   CREATININE 2.4* 2.1*   * 135*   CALCIUM 9.2 9.0   MG 2.1 2.2   ALBUMIN 3.4* 3.2*   PROT 6.7 6.5   ALKPHOS 61 58   ALT 16 13   AST 17 15   BILITOT 0.7 0.5       CARDIAC PROFILE LAST 3 DAYS  Recent Labs   Lab 06/24/23  1549 06/24/23  1815 06/25/23  0006   BNP 64  64  --   --    TROPONINIHS 7.2 6.5 8.6       ENDOCRINE LAST 3 DAYS  Recent Labs   Lab 06/25/23  0006   TSH 1.020       LAST ARTERIAL BLOOD GAS  ABG  No results for input(s): PH, PO2, PCO2, HCO3, BE in the last 168 hours.    LAST 7 DAYS MICROBIOLOGY   Microbiology Results (last 7 days)       ** No results found for the last 168 hours. **            MOST RECENT IMAGING  US Kidney  EXAM: Renal ultrasound.  INDICATION: Acute on chronic renal failure  COMPARISON: None available.    FINDINGS:  The right kidney measures 10.4 cm in length and demonstrates normal parenchymal thickness and echogenicity. No hydronephrosis or shadowing calculi.    The left kidney measures 11.8 cm in length and demonstrates normal parenchymal thickness and echogenicity. No hydronephrosis or shadowing calculi.    The bladder  contour shows no focal abnormality. Bilateral ureteral jets are not visualized during examination.    IMPRESSION:    Negative for hydronephrosis or renal atrophy.    Electronically signed by:  Romero Shah MD  6/24/2023 8:27 PM CDT Workstation: [id number]  X-Ray Chest AP Portable  Chest single view    CLINICAL DATA: Chest pain    FINDINGS: AP view is compared to July 2022. Heart size is normal. There has been previous median sternotomy. The lungs are clear. No acute osseous abnormalities are identified.    IMPRESSION:  1. No acute radiographic abnormalities.    Electronically signed by:  Tomy Alfred MD  6/24/2023 3:50 PM CDT Workstation: [id number]      ECHOCARDIOGRAM RESULTS (last 5)  Results for orders placed during the hospital encounter of 05/09/23    Echo Saline Bubble? No    Interpretation Summary  · The left ventricle is normal in size with normal systolic function.  · The estimated ejection fraction is 75%.  · Normal left ventricular diastolic function.  · Atrial fibrillation not observed.  · Normal right ventricular size with normal right ventricular systolic function.  · Mild tricuspid regurgitation.  · Normal central venous pressure (3 mmHg).  · The estimated PA systolic pressure is 18 mmHg.  · Mild aortic sclerosis      CURRENT/PREVIOUS VISIT EKG  Results for orders placed or performed during the hospital encounter of 06/24/23   EKG 12-lead    Collection Time: 06/24/23  3:02 PM    Narrative    Test Reason : R07.9,    Vent. Rate : 079 BPM     Atrial Rate : 079 BPM     P-R Int : 210 ms          QRS Dur : 150 ms      QT Int : 448 ms       P-R-T Axes : 052 -60 047 degrees     QTc Int : 513 ms    Sinus rhythm with 1st degree A-V block  Left axis deviation  Right bundle branch block  Possible Lateral infarct ,age undetermined  Abnormal ECG  When compared with ECG of 08-DEC-2022 12:53,  Borderline criteria for Lateral infarct are now Present    Referred By: [email or web address]   SELF           Confirmed By:             ASSESSMENT/PLAN:     Active Hospital Problems    Diagnosis    *Chest pain    Diabetes mellitus type 2 in obese    Acute renal failure superimposed on stage 3 chronic kidney disease    COPD (chronic obstructive pulmonary disease)    Hypertension    Tobacco abuse    Morbid obesity    Hypothyroidism       ASSESSMENT & PLAN:     Chest Pain- Recent negative stress, negative troponin   DM 2  Morbid Obesity  HTN  Tobacco use  COPD  Thyroid Dysfunction  YULY on CKD      RECOMMENDATIONS:    Start Imdur 30 mg   Keep over night with IVF  If CP occurs will consider cath, but if not she can follow up with Dr. Lopez as an OP.       Genesis Jeffrey NP  Department of Cardiology  Date of Service: 06/25/2023      I have personally interviewed and examined the patient, I have reviewed the Nurse Practitioner's history and physical, assessment, and plan. I have personally evaluated the patient at bedside and agree with the findings and made appropriate changes as necessary in recommendations.  Patient presented with chest pain which completely resolved.  High sensitivity troponin negative. In view of high risk of contrast induced nephropathy and recent negative stress test, we will treat with intensifying antianginal therapy.  Add Imdur 30 mg daily.  If she remains chest pain-free even with ambulation, she can follow up with Dr. Lopez as outpatient.      Alexa Nguyen MD  Department of Cardiology  [facility]  6/25/23

## 2023-06-25 NOTE — ASSESSMENT & PLAN NOTE
Patient's FSGs are controlled on current medication regimen.  Last A1c reviewed-   Lab Results   Component Value Date    HGBA1C 6.3 (H) 12/08/2022     Most recent fingerstick glucose reviewed- No results for input(s): POCTGLUCOSE in the last 24 hours.  Current correctional scale  Low  Maintain anti-hyperglycemic dose as follows-   Antihyperglycemics (From admission, onward)    None      poct/SSI  Hold Oral hypoglycemics while patient is in the hospital.

## 2023-06-25 NOTE — ASSESSMENT & PLAN NOTE
Body mass index is 41.81 kg/m². Morbid obesity complicates all aspects of disease management from diagnostic modalities to treatment. Weight loss encouraged and health benefits explained to patient.     Nutritional consult

## 2023-06-25 NOTE — PLAN OF CARE
06/25/23 0832   Patient Assessment/Suction   Level of Consciousness (AVPU) alert   Respiratory Effort Unlabored   All Lung Fields Breath Sounds clear   PRE-TX-O2   Device (Oxygen Therapy) room air   SpO2 96 %   Pulse Oximetry Type Intermittent   $ Pulse Oximetry - Multiple Charge Pulse Oximetry - Multiple   Pulse 72   Aerosol Therapy   $ Aerosol Therapy Charges PRN treatment not required   Respiratory Treatment Status (SVN) PRN treatment not required   Education   $ Education Bronchodilator;15 min   Respiratory Evaluation   $ Care Plan Tech Time 15 min

## 2023-06-25 NOTE — H&P
[facility]  History & Physical    Patient Name: Mary Cynthia Cryer  MRN: [id number]  Patient Class: OP- Observation  Admission Date: 6/24/2023  Attending Physician: Regla De La Cruz MD   Primary Care Provider: Emmy Piña MD         Patient information was obtained from patient, spouse/SO and ER records.     Subjective:     Principal Problem:Chest pain    Chief Complaint:   Chief Complaint   Patient presents with    Chest Pain     Reported chest pain 10/10 at home with room air sat of 88%. Currently 95% on room air. Chest pain 3/10 after nitro spray. 0/10 after second nitro spray.        HPI: Patient is a 82 year old female with history of CABG x3, half pack-a-day smoker, COPD and asthma, HTN, hyperlipidemia, dm 2, PVD, hypothyroidism presents to the ER via EMS with acute onset mid sternal radiating chest pressure to bilateral anterior chest that began at 1:32 p.m. Reports pain 10/10 associated with paleness.  She denied shortness of breath, weakness dizziness, lightheadedness, diaphoresis or nausea.  Her daughter reports she did become very pale during chest pressure. She took 4 baby aspirins w/ no pain relief and given sublingual nitro x2 which relieved her pain of the 2nd nitro tablet.  She was evaluated in the ED with a creatinine level 2.4, BUN 19, GFR 19, initial troponin 7.2, EKG show bundle-branch block no ST elevation.    On assessment patient rates chest pressure 0/10.  She reports nitro helped with her pain.  Patient states was at home resting in a chair when she had immediate chest pressure the initially started midsternal and radiated to bilateral breasts area.  She reports last stress test 06/2023. She is followed by cardiology Dr. Lopez.  She denies dyspnea, feeling nauseous, vomiting, lightheaded, dizziness, fever, chills.  She reports smoking pack to a half pack a day, denies alcohol use, illicit drugs.    Hospitalist asked to admit for chest pain rule  out, acute on chronic renal failure    Last stress test 06/2023  Conclusion     The ECG portion of the study is negative for ischemia.    The patient reported no chest pain during the stress test.    There were no arrhythmias during stress.    The nuclear portion of this study will be reported separately.    Last echo 4/2023 Summary   The left ventricle is normal in size with normal systolic function.   The estimated ejection fraction is 75%.   Normal left ventricular diastolic function.   Atrial fibrillation not observed.   Normal right ventricular size with normal right ventricular systolic function.   Mild tricuspid regurgitation.   Normal central venous pressure (3 mmHg).   The estimated PA systolic pressure is 18 mmHg.   Mild aortic sclerosis         Past Medical History:   Diagnosis Date    Allergy     Anticoagulant long-term use     Diabetes mellitus     Diabetes mellitus, type 2     Disorder of kidney and ureter     Hyperlipidemia     Hypertension     Obesity     Peripheral vascular disease     Thyroid disease     Type 2 diabetes mellitus        Past Surgical History:   Procedure Laterality Date    ANGIOGRAPHY OF LOWER EXTREMITY N/A 12/11/2019    Procedure: Angiogram Extremity Unilateral;  Surgeon: Cruz Armas MD;  Location: [facility] CATH/EP LAB;  Service: Cardiology;  Laterality: N/A;    ANKLE HARDWARE REMOVAL Right 12/15/2022    Procedure: REMOVAL, HARDWARE, ANKLE;  Surgeon: Nae Jerry MD;  Location: [facility] OR;  Service: Orthopedics;  Laterality: Right;    CORONARY ARTERY BYPASS GRAFT      HYSTERECTOMY      partial    IRRIGATION AND DEBRIDEMENT OF LOWER EXTREMITY Right 12/15/2022    Procedure: IRRIGATION AND DEBRIDEMENT, LOWER EXTREMITY;  Surgeon: Nae Jerry MD;  Location: [facility] OR;  Service: Orthopedics;  Laterality: Right;    OPEN REDUCTION AND INTERNAL FIXATION (ORIF) OF INJURY OF ANKLE Right 7/20/2022    Procedure: ORIF, ANKLE- medial mallelous;  Surgeon: Nae WOLFF  MD Rosalino;  Location: [facility];  Service: Orthopedics;  Laterality: Right;    PERIPHERAL ANGIOGRAPHY  12/11/2019       Review of patient's allergies indicates:   Allergen Reactions    Tetracyclines Swelling       Current Facility-Administered Medications on File Prior to Encounter   Medication    gentian violet 1 % topical solution 0.5 mL     Current Outpatient Medications on File Prior to Encounter   Medication Sig    albuterol (PROVENTIL/VENTOLIN HFA) 90 mcg/actuation inhaler Inhale 2 puffs into the lungs every 6 (six) hours as needed.    amLODIPine (NORVASC) 5 MG tablet Take 5 mg by mouth once daily.    aspirin (ECOTRIN) 81 MG EC tablet Take 81 mg by mouth once daily.     cilostazoL (PLETAL) 100 MG Tab Take 100 mg by mouth 2 (two) times daily.    clopidogreL (PLAVIX) 75 mg tablet Take 75 mg by mouth once daily.    collagenase (SANTYL) ointment Apply topically once daily.    gabapentin (NEURONTIN) 300 MG capsule Take 300 mg by mouth 2 (two) times daily.     insulin glargine (LANTUS) 100 unit/mL injection Inject 35 Units into the skin once daily. And takes 20 units q pm    levothyroxine (SYNTHROID) 25 MCG tablet Take 25 mcg by mouth once daily.    lisinopriL 10 MG tablet Take 10 mg by mouth once daily.    metformin (GLUCOPHAGE) 500 MG tablet Take 500 mg by mouth 2 (two) times daily with meals.    metoprolol tartrate (LOPRESSOR) 50 MG tablet Take 50 mg by mouth 2 (two) times daily.    NOVOLIN R REGULAR U-100 INSULN 100 unit/mL injection Inject 6 Units into the skin 2 (two) times daily before meals.    pantoprazole (PROTONIX) 40 MG tablet Take 40 mg by mouth once daily.    rosuvastatin (CRESTOR) 40 MG Tab Take 40 mg by mouth once daily.    solifenacin (VESICARE) 10 MG tablet Take 10 mg by mouth once daily.    traMADoL (ULTRAM) 50 mg tablet Take 50 mg by mouth every 8 (eight) hours as needed.    triamterene-hydrochlorothiazide 37.5-25 mg (MAXZIDE-25) 37.5-25 mg per tablet Take 1 tablet by mouth  "once daily.     alcohol swabs PadM USE THREE TIMES DAILY  TO  CHECK BLOOD SUGAR    BD ALCOHOL SWABS PadM     DROPLET INSULIN SYRINGE 1 mL 31 gauge x 15/64" Syrg     HYDROcodone-acetaminophen (NORCO)  mg per tablet Take 1 tablet by mouth every 4 (four) hours as needed.    HYDROcodone-acetaminophen (NORCO)  mg per tablet Take 1 tablet by mouth every 4 (four) hours as needed (pain).    HYDROcodone-acetaminophen (NORCO)  mg per tablet Take 1 tablet by mouth every 4 (four) hours as needed for Pain.    insulin syringe-needle U-100 0.3 mL 30 gauge x 5/16" Syrg by Other route.    lisinopriL (PRINIVIL,ZESTRIL) 20 MG tablet Take 1 tablet (20 mg total) by mouth once daily. (Patient taking differently: Take 10 mg by mouth once daily.)    metoprolol succinate (TOPROL-XL) 50 MG 24 hr tablet Take 50 mg by mouth 2 (two) times daily. 25 mg + 50 mg = 75 mg BID    MYRBETRIQ 25 mg Tb24 ER tablet Take 25 mg by mouth once daily.    nitroGLYCERIN (NITROSTAT) 0.3 MG SL tablet Place 0.3 mg under the tongue every 5 (five) minutes as needed.    TRUE METRIX GLUCOSE TEST STRIP Strp     TRUEPLUS LANCETS 30 gauge Misc     [DISCONTINUED] ciprofloxacin HCl (CIPRO) 500 MG tablet Take 500 mg by mouth 2 (two) times daily.     Family History       Problem Relation (Age of Onset)    Alzheimer's disease Mother    Diabetes Brother    Heart disease Father, Brother    No Known Problems Sister, Daughter, Son, Sister, Sister, Sister, Sister, Brother, Son, Son          Tobacco Use    Smoking status: Every Day     Packs/day: 0.50     Years: 60.00     Pack years: 30.00     Types: Cigarettes    Smokeless tobacco: Never   Substance and Sexual Activity    Alcohol use: No    Drug use: No    Sexual activity: Never     Review of Systems   Constitutional:  Negative for activity change, appetite change, chills, diaphoresis, fatigue, fever and unexpected weight change.   HENT:  Negative for congestion, dental problem, facial swelling, " nosebleeds, sinus pressure, sinus pain, sore throat and trouble swallowing.    Eyes:  Negative for pain and redness.   Respiratory:  Positive for chest tightness. Negative for apnea, cough, shortness of breath and wheezing.    Cardiovascular:  Negative for chest pain, palpitations and leg swelling.   Gastrointestinal:  Negative for abdominal distention, abdominal pain, anal bleeding, blood in stool, constipation, diarrhea, nausea and vomiting.   Endocrine: Negative for polyphagia and polyuria.   Genitourinary:  Negative for decreased urine volume, difficulty urinating, dysuria, frequency, hematuria and urgency.   Musculoskeletal:  Negative for back pain, gait problem, joint swelling, myalgias, neck pain and neck stiffness.   Skin:  Positive for pallor. Negative for color change, rash and wound.   Neurological:  Negative for dizziness, seizures, syncope, facial asymmetry, speech difficulty, weakness, light-headedness, numbness and headaches.   Psychiatric/Behavioral:  Negative for agitation, behavioral problems, confusion, hallucinations, sleep disturbance and suicidal ideas.    Objective:     Vital Signs (Most Recent):  Temp: 98.2 °F (36.8 °C) (06/24/23 1450)  Pulse: 80 (06/24/23 1600)  Resp: (!) 21 (06/24/23 1530)  BP: (!) 143/59 (06/24/23 1530)  SpO2: (!) 94 % (06/24/23 1600) Vital Signs (24h Range):  Temp:  [98.2 °F (36.8 °C)] 98.2 °F (36.8 °C)  Pulse:  [77-83] 80  Resp:  [20-21] 21  SpO2:  [94 %-99 %] 94 %  BP: (137-144)/(59-65) 143/59     Weight: 124.7 kg (275 lb)  Body mass index is 41.81 kg/m².     Physical Exam  Vitals reviewed.   Constitutional:       General: She is not in acute distress.     Appearance: Normal appearance. She is not ill-appearing, toxic-appearing or diaphoretic.   HENT:      Head: Normocephalic.      Right Ear: External ear normal.      Left Ear: External ear normal.      Nose: No congestion or rhinorrhea.      Mouth/Throat:      Mouth: Mucous membranes are moist.      Pharynx: Oropharynx  is clear. No oropharyngeal exudate or posterior oropharyngeal erythema.   Eyes:      Extraocular Movements: Extraocular movements intact.      Conjunctiva/sclera: Conjunctivae normal.      Pupils: Pupils are equal, round, and reactive to light.   Cardiovascular:      Rate and Rhythm: Normal rate and regular rhythm.      Pulses: Normal pulses.      Heart sounds: Normal heart sounds.   Pulmonary:      Effort: Pulmonary effort is normal.      Breath sounds: Normal breath sounds.   Abdominal:      General: Abdomen is flat. Bowel sounds are normal.      Palpations: Abdomen is soft.   Genitourinary:     Rectum: Normal.   Musculoskeletal:         General: Normal range of motion.      Cervical back: Normal range of motion and neck supple.   Skin:     General: Skin is warm and dry.      Capillary Refill: Capillary refill takes less than 2 seconds.   Neurological:      Mental Status: She is alert and oriented to person, place, and time.   Psychiatric:         Mood and Affect: Mood normal.            CRANIAL NERVES     CN III, IV, VI   Pupils are equal, round, and reactive to light.     Significant Labs: All pertinent labs within the past 24 hours have been reviewed.  Recent Lab Results         06/24/23  1549        Albumin 3.4       Alkaline Phosphatase 61       ALT 16       Anion Gap 8       AST 17       Baso # 0.03       Basophil % 0.4       BILIRUBIN TOTAL 0.7  Comment: For infants and newborns, interpretation of results should be based  on gestational age, weight and in agreement with clinical  observations.    Premature Infant recommended reference ranges:  Up to 24 hours.............<8.0 mg/dL  Up to 48 hours............<12.0 mg/dL  3-5 days..................<15.0 mg/dL  6-29 days.................<15.0 mg/dL         BNP 64  Comment: Values of less than 100 pg/ml are consistent with non-CHF populations.        64  Comment: Values of less than 100 pg/ml are consistent with non-CHF populations.       BUN 19       Calcium  9.2       Chloride 105       CO2 27       Creatinine 2.4       Differential Method Automated       eGFR 19.9       Eos # 0.1       Eosinophil % 1.7       Glucose 140       Gran # (ANC) 5.5       Gran % 71.2       Hematocrit 39.4       Hemoglobin 13.0       Immature Grans (Abs) 0.02  Comment: Mild elevation in immature granulocytes is non specific and   can be seen in a variety of conditions including stress response,   acute inflammation, trauma and pregnancy. Correlation with other   laboratory and clinical findings is essential.         Immature Granulocytes 0.3       INR 1.0  Comment: Coumadin Therapy:  2.0 - 3.0 for INR for all indicators except mechanical heart valves  and antiphospholipid syndromes which should use 2.5 - 3.5.         Lymph # 1.4       Lymph % 18.6       Magnesium 2.1       MCH 33.3       MCHC 33.0              Mono # 0.6       Mono % 7.8       MPV 9.4       nRBC 0       Platelets 232       Potassium 3.6       PROTEIN TOTAL 6.7       Protime 10.9       RBC 3.90       RDW 12.1       Sodium 140       Troponin I High Sensitivity 7.2  Comment: Troponin results differ between methods. Do not use   results between Troponin methods interchangeably.    Alkaline Phospatase levels above 400 U/L may   cause false positive results.    Access hsTnI should not be used for patients taking   Asfotase cain (Strensiq).         WBC 7.69               Significant Imaging: I have reviewed all pertinent imaging results/findings within the past 24 hours.    Assessment/Plan:     * Chest pain  Meghan   Initial troponin 7.2--> trend troponins x2  ekg prn  Limited echo  Will make Npo p MN  Consider cards consult if needed     COPD (chronic obstructive pulmonary disease)  No exacerbation/stable not requiring any oxygen  Breathing treatments p.r.n.  Continue home breathing treatment  Encouraged smoking cessation    Acute renal failure superimposed on stage 3 chronic kidney disease  Initial creatinine 2.4, BUN 19, GFR  19  Hold NSAIDs/nephrotoxins medications  Strict I's and O's  Renal ultrasound  IV NS gentle hydration 75ml/hr  Recheck cmp am   Monitor cret closely if no improvement consider Nephro Consult    Diabetes mellitus type 2 in obese  Patient's FSGs are controlled on current medication regimen.  Last A1c reviewed-   Lab Results   Component Value Date    HGBA1C 6.3 (H) 12/08/2022     Most recent fingerstick glucose reviewed- No results for input(s): POCTGLUCOSE in the last 24 hours.  Current correctional scale  Low  Maintain anti-hyperglycemic dose as follows-   Antihyperglycemics (From admission, onward)    None      poct/SSI  Hold Oral hypoglycemics while patient is in the hospital.    Tobacco abuse  Smokes a pack to pack a encouraged smoking sensation  Patient has no desire to stop smoking history of COPD  Nicotine patch      Hypertension  Stable/chronic  Monitor vital signs q.4  Continue home medications      Hypothyroidism  Continue home medication  TSH      Morbid obesity  Body mass index is 41.81 kg/m². Morbid obesity complicates all aspects of disease management from diagnostic modalities to treatment. Weight loss encouraged and health benefits explained to patient.     Nutritional consult      VTE Risk Mitigation (From admission, onward)         Ordered     IP VTE HIGH RISK PATIENT  Once         06/24/23 1840     Place sequential compression device  Until discontinued         06/24/23 1840                     On 06/24/2023, patient should be placed in hospital observation services under my care in collaboration with SANTIAGO Price  Department of Hospital Medicine  [facility]

## 2023-06-26 VITALS
OXYGEN SATURATION: 93 % | TEMPERATURE: 97 F | BODY MASS INDEX: 38.42 KG/M2 | WEIGHT: 253.5 LBS | HEIGHT: 68 IN | RESPIRATION RATE: 20 BRPM | SYSTOLIC BLOOD PRESSURE: 121 MMHG | DIASTOLIC BLOOD PRESSURE: 72 MMHG | HEART RATE: 68 BPM

## 2023-06-26 LAB
ALBUMIN SERPL BCP-MCNC: 3.1 G/DL (ref 3.5–5.2)
ALP SERPL-CCNC: 59 U/L (ref 55–135)
ALT SERPL W/O P-5'-P-CCNC: 12 U/L (ref 10–44)
ANION GAP SERPL CALC-SCNC: 5 MMOL/L (ref 8–16)
AST SERPL-CCNC: 14 U/L (ref 10–40)
BASOPHILS # BLD AUTO: 0.04 K/UL (ref 0–0.2)
BASOPHILS NFR BLD: 0.6 % (ref 0–1.9)
BILIRUB SERPL-MCNC: 0.5 MG/DL (ref 0.1–1)
BUN SERPL-MCNC: 20 MG/DL (ref 8–23)
CALCIUM SERPL-MCNC: 8.9 MG/DL (ref 8.7–10.5)
CHLORIDE SERPL-SCNC: 109 MMOL/L (ref 95–110)
CO2 SERPL-SCNC: 27 MMOL/L (ref 23–29)
CREAT SERPL-MCNC: 1.8 MG/DL (ref 0.5–1.4)
DIFFERENTIAL METHOD: ABNORMAL
EOSINOPHIL # BLD AUTO: 0.2 K/UL (ref 0–0.5)
EOSINOPHIL NFR BLD: 2.9 % (ref 0–8)
ERYTHROCYTE [DISTWIDTH] IN BLOOD BY AUTOMATED COUNT: 11.9 % (ref 11.5–14.5)
EST. GFR  (NO RACE VARIABLE): 28.1 ML/MIN/1.73 M^2
GLUCOSE SERPL-MCNC: 160 MG/DL (ref 70–110)
GLUCOSE SERPL-MCNC: 186 MG/DL (ref 70–110)
GLUCOSE SERPL-MCNC: 216 MG/DL (ref 70–110)
HCT VFR BLD AUTO: 38.2 % (ref 37–48.5)
HGB BLD-MCNC: 12.6 G/DL (ref 12–16)
IMM GRANULOCYTES # BLD AUTO: 0.01 K/UL (ref 0–0.04)
IMM GRANULOCYTES NFR BLD AUTO: 0.2 % (ref 0–0.5)
LYMPHOCYTES # BLD AUTO: 1.8 K/UL (ref 1–4.8)
LYMPHOCYTES NFR BLD: 27.3 % (ref 18–48)
MCH RBC QN AUTO: 33.3 PG (ref 27–31)
MCHC RBC AUTO-ENTMCNC: 33 G/DL (ref 32–36)
MCV RBC AUTO: 101 FL (ref 82–98)
MONOCYTES # BLD AUTO: 0.6 K/UL (ref 0.3–1)
MONOCYTES NFR BLD: 9.2 % (ref 4–15)
NEUTROPHILS # BLD AUTO: 3.9 K/UL (ref 1.8–7.7)
NEUTROPHILS NFR BLD: 59.8 % (ref 38–73)
NRBC BLD-RTO: 0 /100 WBC
PLATELET # BLD AUTO: 225 K/UL (ref 150–450)
PMV BLD AUTO: 10 FL (ref 9.2–12.9)
POTASSIUM SERPL-SCNC: 3.2 MMOL/L (ref 3.5–5.1)
PROT SERPL-MCNC: 6.1 G/DL (ref 6–8.4)
RBC # BLD AUTO: 3.78 M/UL (ref 4–5.4)
SODIUM SERPL-SCNC: 141 MMOL/L (ref 136–145)
WBC # BLD AUTO: 6.51 K/UL (ref 3.9–12.7)

## 2023-06-26 PROCEDURE — 99900035 HC TECH TIME PER 15 MIN (STAT)

## 2023-06-26 PROCEDURE — 25000003 PHARM REV CODE 250: Performed by: NURSE PRACTITIONER

## 2023-06-26 PROCEDURE — 80053 COMPREHEN METABOLIC PANEL: CPT | Performed by: FAMILY MEDICINE

## 2023-06-26 PROCEDURE — 85025 COMPLETE CBC W/AUTO DIFF WBC: CPT | Performed by: FAMILY MEDICINE

## 2023-06-26 PROCEDURE — S4991 NICOTINE PATCH NONLEGEND: HCPCS | Performed by: FAMILY MEDICINE

## 2023-06-26 PROCEDURE — 99214 OFFICE O/P EST MOD 30 MIN: CPT | Mod: ,,, | Performed by: INTERNAL MEDICINE

## 2023-06-26 PROCEDURE — 36415 COLL VENOUS BLD VENIPUNCTURE: CPT | Performed by: FAMILY MEDICINE

## 2023-06-26 PROCEDURE — 94761 N-INVAS EAR/PLS OXIMETRY MLT: CPT

## 2023-06-26 PROCEDURE — 25000003 PHARM REV CODE 250: Performed by: FAMILY MEDICINE

## 2023-06-26 PROCEDURE — G0378 HOSPITAL OBSERVATION PER HR: HCPCS

## 2023-06-26 PROCEDURE — 99214 PR OFFICE/OUTPT VISIT, EST, LEVL IV, 30-39 MIN: ICD-10-PCS | Mod: ,,, | Performed by: INTERNAL MEDICINE

## 2023-06-26 RX ORDER — ISOSORBIDE MONONITRATE 30 MG/1
30 TABLET, EXTENDED RELEASE ORAL DAILY
Qty: 30 TABLET | Refills: 0 | OUTPATIENT
Start: 2023-06-27 | End: 2023-09-07

## 2023-06-26 RX ADMIN — LEVOTHYROXINE SODIUM 25 MCG: 0.03 TABLET ORAL at 05:06

## 2023-06-26 RX ADMIN — CLOPIDOGREL BISULFATE 75 MG: 75 TABLET, FILM COATED ORAL at 08:06

## 2023-06-26 RX ADMIN — AMLODIPINE BESYLATE 5 MG: 5 TABLET ORAL at 08:06

## 2023-06-26 RX ADMIN — LISINOPRIL 10 MG: 10 TABLET ORAL at 08:06

## 2023-06-26 RX ADMIN — CILOSTAZOL 100 MG: 100 TABLET ORAL at 08:06

## 2023-06-26 RX ADMIN — NICOTINE 1 PATCH: 21 PATCH, EXTENDED RELEASE TRANSDERMAL at 08:06

## 2023-06-26 RX ADMIN — ASPIRIN 81 MG: 81 TABLET, COATED ORAL at 08:06

## 2023-06-26 RX ADMIN — METOPROLOL TARTRATE 50 MG: 50 TABLET, FILM COATED ORAL at 08:06

## 2023-06-26 RX ADMIN — GABAPENTIN 300 MG: 300 CAPSULE ORAL at 08:06

## 2023-06-26 RX ADMIN — OXYBUTYNIN CHLORIDE 10 MG: 5 TABLET, EXTENDED RELEASE ORAL at 08:06

## 2023-06-26 RX ADMIN — POTASSIUM BICARBONATE 35 MEQ: 391 TABLET, EFFERVESCENT ORAL at 08:06

## 2023-06-26 RX ADMIN — ATORVASTATIN CALCIUM 80 MG: 40 TABLET, FILM COATED ORAL at 08:06

## 2023-06-26 RX ADMIN — PANTOPRAZOLE SODIUM 40 MG: 40 TABLET, DELAYED RELEASE ORAL at 05:06

## 2023-06-26 RX ADMIN — ISOSORBIDE MONONITRATE 30 MG: 30 TABLET, EXTENDED RELEASE ORAL at 08:06

## 2023-06-26 NOTE — PLAN OF CARE
Problem: Adult Inpatient Plan of Care  Goal: Plan of Care Review  Outcome: Met  Goal: Patient-Specific Goal (Individualized)  Outcome: Met  Goal: Absence of Hospital-Acquired Illness or Injury  Outcome: Met  Goal: Optimal Comfort and Wellbeing  Outcome: Met  Goal: Readiness for Transition of Care  Outcome: Met     Problem: Bariatric Environmental Safety  Goal: Safety Maintained with Care  Outcome: Met     Problem: Fluid and Electrolyte Imbalance (Acute Kidney Injury/Impairment)  Goal: Fluid and Electrolyte Balance  Outcome: Met     Problem: Oral Intake Inadequate (Acute Kidney Injury/Impairment)  Goal: Optimal Nutrition Intake  Outcome: Met     Problem: Renal Function Impairment (Acute Kidney Injury/Impairment)  Goal: Effective Renal Function  Outcome: Met     Problem: Diabetes Comorbidity  Goal: Blood Glucose Level Within Targeted Range  Outcome: Met

## 2023-06-26 NOTE — PROGRESS NOTES
[facility]  Department of Cardiology  Progress Note      PATIENT NAME: Mary Cynthia Cryer  MRN: [id number]  TODAY'S DATE: 06/26/2023  ADMIT DATE: 6/24/2023                          CONSULT REQUESTED BY: Abdirashid Hemphill MD    SUBJECTIVE     PRINCIPAL PROBLEM: Chest pain    6/26/23:  Patient seen resting in bed with family at bedside. No further chest pain reported.     REASON FOR CONSULT:  Chest Pain      HPI:    82 year old female patient known to Dr. Lopez has been in her normal state of health. PMH significant tof CAD and CABG in 1995 no further issues. Stress test recently negative which was done because was time and not because of symptoms she tells us. She started having cp similar to prior to her bypass and it did not go away with her inhaler or tums so she came to er it went away in EMS with nitro. Trop is negative. Currently CP free. Currently YULY getting IVF.    FROM [facility]     Principal Problem:Chest pain     Chief Complaint:        Chief Complaint   Patient presents with    Chest Pain       Reported chest pain 10/10 at home with room air sat of 88%. Currently 95% on room air. Chest pain 3/10 after nitro spray. 0/10 after second nitro spray.         HPI: Patient is a 82 year old female with history of CABG x3, half pack-a-day smoker, COPD and asthma, HTN, hyperlipidemia, dm 2, PVD, hypothyroidism presents to the ER via EMS with acute onset mid sternal radiating chest pressure to bilateral anterior chest that began at 1:32 p.m. Reports pain 10/10 associated with paleness.  She denied shortness of breath, weakness dizziness, lightheadedness, diaphoresis or nausea.  Her daughter reports she did become very pale during chest pressure. She took 4 baby aspirins w/ no pain relief and given sublingual nitro x2 which relieved her pain of the 2nd nitro tablet.  She was evaluated in the ED with a creatinine level 2.4, BUN 19, GFR 19, initial troponin 7.2, EKG show bundle-branch block no ST  elevation.     On assessment patient rates chest pressure 0/10.  She reports nitro helped with her pain.  Patient states was at home resting in a chair when she had immediate chest pressure the initially started midsternal and radiated to bilateral breasts area.  She reports last stress test 06/2023. She is followed by cardiology Dr. Lopez.  She denies dyspnea, feeling nauseous, vomiting, lightheaded, dizziness, fever, chills.  She reports smoking pack to a half pack a day, denies alcohol use, illicit drugs.     Hospitalist asked to admit for chest pain rule out, acute on chronic renal failure     Last stress test 06/2023  Conclusion     The ECG portion of the study is negative for ischemia.    The patient reported no chest pain during the stress test.    There were no arrhythmias during stress.    The nuclear portion of this study will be reported separately.     Last echo 4/2023 Summary  The left ventricle is normal in size with normal systolic function.  The estimated ejection fraction is 75%.  Normal left ventricular diastolic function.  Atrial fibrillation not observed.  Normal right ventricular size with normal right ventricular systolic function.  Mild tricuspid regurgitation.  Normal central venous pressure (3 mmHg).  The estimated PA systolic pressure is 18 mmHg.  Mild aortic sclerosis           Review of patient's allergies indicates:   Allergen Reactions    Tetracyclines Swelling       Past Medical History:   Diagnosis Date    Allergy     Anticoagulant long-term use     Diabetes mellitus     Diabetes mellitus, type 2     Disorder of kidney and ureter     Hyperlipidemia     Hypertension     Obesity     Peripheral vascular disease     Thyroid disease     Type 2 diabetes mellitus      Past Surgical History:   Procedure Laterality Date    ANGIOGRAPHY OF LOWER EXTREMITY N/A 12/11/2019    Procedure: Angiogram Extremity Unilateral;  Surgeon: Cruz Armas MD;  Location: [facility] CATH/EP LAB;  Service:  Cardiology;  Laterality: N/A;    ANKLE HARDWARE REMOVAL Right 12/15/2022    Procedure: REMOVAL, HARDWARE, ANKLE;  Surgeon: Nae Jerry MD;  Location: [facility] OR;  Service: Orthopedics;  Laterality: Right;    CORONARY ARTERY BYPASS GRAFT      HYSTERECTOMY      partial    IRRIGATION AND DEBRIDEMENT OF LOWER EXTREMITY Right 12/15/2022    Procedure: IRRIGATION AND DEBRIDEMENT, LOWER EXTREMITY;  Surgeon: Nae Jerry MD;  Location: [facility] OR;  Service: Orthopedics;  Laterality: Right;    OPEN REDUCTION AND INTERNAL FIXATION (ORIF) OF INJURY OF ANKLE Right 7/20/2022    Procedure: ORIF, ANKLE- medial mallelous;  Surgeon: Nae Jerry MD;  Location: [facility] OR;  Service: Orthopedics;  Laterality: Right;    PERIPHERAL ANGIOGRAPHY  12/11/2019     Social History     Tobacco Use    Smoking status: Every Day     Packs/day: 0.50     Years: 60.00     Pack years: 30.00     Types: Cigarettes    Smokeless tobacco: Never   Substance Use Topics    Alcohol use: No    Drug use: No        REVIEW OF SYSTEMS  +CP PRIOR TO ARRIVAL    OBJECTIVE     VITAL SIGNS (Most Recent)  Temp: 97.5 °F (36.4 °C) (06/26/23 0810)  Pulse: 78 (06/26/23 0846)  Resp: 19 (06/26/23 0846)  BP: (!) 155/70 (06/26/23 0810)  SpO2: 96 % (06/26/23 0846)    VENTILATION STATUS  Resp: 19 (06/26/23 0846)  SpO2: 96 % (06/26/23 0846)           I & O (Last 24H):No intake or output data in the 24 hours ending 06/26/23 0913    WEIGHTS  Wt Readings from Last 3 Encounters:   06/26/23 0500 115 kg (253 lb 8.5 oz)   06/25/23 0402 114.1 kg (251 lb 8.7 oz)   06/24/23 2231 115.3 kg (254 lb 3.1 oz)   06/24/23 2033 115.3 kg (254 lb 3.1 oz)   06/24/23 1450 124.7 kg (275 lb)   06/25/23 1146 113.9 kg (251 lb)   05/09/23 0900 122.5 kg (270 lb)       PHYSICAL EXAM  GENERAL: well built, well nourished, well-developed in no apparent distress alert and oriented.   HEENT: Normocephalic.   NECK: No JVD. No bruit..   CARDIAC: Regular rate and rhythm. S1 is normal.S2 is normal.  CHEST ANATOMY:  normal.   LUNGS: Clear to auscultation. No wheezing or rhonchi..   ABDOMEN: Soft.   URINARY: No arrieta catheter   EXTREMITIES: No cyanosis, clubbing or edema noted at this time.,   CENTRAL NERVOUS SYSTEM: No focal motor or sensory deficits noted.   SKIN: Skin without lesions, moist, well perfused.   MUSCLE STRENGTH & TONE: No noteable weakness, atrophy or abnormal movement.     HOME MEDICATIONS:  No current facility-administered medications on file prior to encounter.     Current Outpatient Medications on File Prior to Encounter   Medication Sig Dispense Refill    albuterol (PROVENTIL/VENTOLIN HFA) 90 mcg/actuation inhaler Inhale 2 puffs into the lungs every 6 (six) hours as needed.      amLODIPine (NORVASC) 5 MG tablet Take 5 mg by mouth once daily.      aspirin (ECOTRIN) 81 MG EC tablet Take 81 mg by mouth once daily.       cilostazoL (PLETAL) 100 MG Tab Take 100 mg by mouth 2 (two) times daily.      clopidogreL (PLAVIX) 75 mg tablet Take 75 mg by mouth once daily.      collagenase (SANTYL) ointment Apply topically once daily. 30 g 1    gabapentin (NEURONTIN) 300 MG capsule Take 300 mg by mouth 2 (two) times daily.       insulin glargine (LANTUS) 100 unit/mL injection Inject 35 Units into the skin once daily. And takes 20 units q pm      levothyroxine (SYNTHROID) 25 MCG tablet Take 25 mcg by mouth once daily.      lisinopriL 10 MG tablet Take 10 mg by mouth once daily.      metformin (GLUCOPHAGE) 500 MG tablet Take 500 mg by mouth 2 (two) times daily with meals.      metoprolol tartrate (LOPRESSOR) 50 MG tablet Take 50 mg by mouth 2 (two) times daily.      NOVOLIN R REGULAR U-100 INSULN 100 unit/mL injection Inject 6 Units into the skin 2 (two) times daily before meals.      pantoprazole (PROTONIX) 40 MG tablet Take 40 mg by mouth once daily.      rosuvastatin (CRESTOR) 40 MG Tab Take 40 mg by mouth once daily.      solifenacin (VESICARE) 10 MG tablet Take 10 mg by mouth once daily.      traMADoL (ULTRAM) 50 mg  "tablet Take 50 mg by mouth every 8 (eight) hours as needed.      triamterene-hydrochlorothiazide 37.5-25 mg (MAXZIDE-25) 37.5-25 mg per tablet Take 1 tablet by mouth once daily.       alcohol swabs PadM USE THREE TIMES DAILY  TO  CHECK BLOOD SUGAR      BD ALCOHOL SWABS PadM       DROPLET INSULIN SYRINGE 1 mL 31 gauge x 15/64" Syrg       HYDROcodone-acetaminophen (NORCO)  mg per tablet Take 1 tablet by mouth every 4 (four) hours as needed. 40 tablet 0    HYDROcodone-acetaminophen (NORCO)  mg per tablet Take 1 tablet by mouth every 4 (four) hours as needed (pain). 25 tablet 0    HYDROcodone-acetaminophen (NORCO)  mg per tablet Take 1 tablet by mouth every 4 (four) hours as needed for Pain. 25 tablet 0    insulin syringe-needle U-100 0.3 mL 30 gauge x 5/16" Syrg by Other route.      lisinopriL (PRINIVIL,ZESTRIL) 20 MG tablet Take 1 tablet (20 mg total) by mouth once daily. (Patient taking differently: Take 10 mg by mouth once daily.)      metoprolol succinate (TOPROL-XL) 50 MG 24 hr tablet Take 50 mg by mouth 2 (two) times daily. 25 mg + 50 mg = 75 mg BID      MYRBETRIQ 25 mg Tb24 ER tablet Take 25 mg by mouth once daily.      nitroGLYCERIN (NITROSTAT) 0.3 MG SL tablet Place 0.3 mg under the tongue every 5 (five) minutes as needed.      TRUE METRIX GLUCOSE TEST STRIP Strp       TRUEPLUS LANCETS 30 gauge Misc          SCHEDULED MEDS:   amLODIPine  5 mg Oral Daily    aspirin  81 mg Oral Daily    atorvastatin  80 mg Oral Daily    cilostazoL  100 mg Oral BID    clopidogreL  75 mg Oral Daily    gabapentin  300 mg Oral BID    insulin detemir U-100  20 Units Subcutaneous QHS    isosorbide mononitrate  30 mg Oral Daily    levothyroxine  25 mcg Oral Before breakfast    lisinopriL  10 mg Oral Daily    metoprolol tartrate  50 mg Oral BID    nicotine  1 patch Transdermal Daily    oxybutynin  10 mg Oral Daily    pantoprazole  40 mg Oral Before breakfast       CONTINUOUS INFUSIONS:        PRN MEDS:acetaminophen, " albuterol sulfate, dextrose 50%, dextrose 50%, glucagon (human recombinant), glucose, glucose, magnesium oxide, magnesium oxide, melatonin, morphine, nitroGLYCERIN, ondansetron, potassium bicarbonate, potassium bicarbonate, potassium bicarbonate, potassium, sodium phosphates, potassium, sodium phosphates, potassium, sodium phosphates, prochlorperazine, sodium chloride 0.9%    LABS AND DIAGNOSTICS     CBC LAST 3 DAYS  Recent Labs   Lab 06/24/23  1549 06/25/23  0740 06/26/23  0407   WBC 7.69 6.76 6.51   RBC 3.90* 3.91* 3.78*   HGB 13.0 13.1 12.6   HCT 39.4 39.6 38.2   * 101* 101*   MCH 33.3* 33.5* 33.3*   MCHC 33.0 33.1 33.0   RDW 12.1 12.0 11.9    246 225   MPV 9.4 9.6 10.0   GRAN 71.2  5.5 64.6  4.4 59.8  3.9   LYMPH 18.6  1.4 22.6  1.5 27.3  1.8   MONO 7.8  0.6 9.3  0.6 9.2  0.6   BASO 0.03 0.03 0.04   NRBC 0 0 0         COAGULATION LAST 3 DAYS  Recent Labs   Lab 06/24/23  1549   INR 1.0         CHEMISTRY LAST 3 DAYS  Recent Labs   Lab 06/24/23  1549 06/25/23  0740 06/26/23  0407    142 141   K 3.6 3.5 3.2*    109 109   CO2 27 28 27   ANIONGAP 8 5* 5*   BUN 19 19 20   CREATININE 2.4* 2.1* 1.8*   * 135* 186*   CALCIUM 9.2 9.0 8.9   MG 2.1 2.2  --    ALBUMIN 3.4* 3.2* 3.1*   PROT 6.7 6.5 6.1   ALKPHOS 61 58 59   ALT 16 13 12   AST 17 15 14   BILITOT 0.7 0.5 0.5         CARDIAC PROFILE LAST 3 DAYS  Recent Labs   Lab 06/24/23  1549 06/24/23  1815 06/25/23  0006   BNP 64  64  --   --    TROPONINIHS 7.2 6.5 8.6         ENDOCRINE LAST 3 DAYS  Recent Labs   Lab 06/25/23  0006   TSH 1.020         LAST ARTERIAL BLOOD GAS  ABG  No results for input(s): PH, PO2, PCO2, HCO3, BE in the last 168 hours.    LAST 7 DAYS MICROBIOLOGY   Microbiology Results (last 7 days)       ** No results found for the last 168 hours. **            MOST RECENT IMAGING  Echo  · The left ventricle is normal in size with concentric remodeling and   normal systolic function.  · The estimated ejection fraction  is 55%.  · Limited echo no Doppler         ECHOCARDIOGRAM RESULTS (last 5)  Results for orders placed during the hospital encounter of 05/09/23    Echo Saline Bubble? No    Interpretation Summary  · The left ventricle is normal in size with normal systolic function.  · The estimated ejection fraction is 75%.  · Normal left ventricular diastolic function.  · Atrial fibrillation not observed.  · Normal right ventricular size with normal right ventricular systolic function.  · Mild tricuspid regurgitation.  · Normal central venous pressure (3 mmHg).  · The estimated PA systolic pressure is 18 mmHg.  · Mild aortic sclerosis      CURRENT/PREVIOUS VISIT EKG  Results for orders placed or performed during the hospital encounter of 06/24/23   EKG 12-lead    Collection Time: 06/24/23  3:02 PM    Narrative    Test Reason : R07.9,    Vent. Rate : 079 BPM     Atrial Rate : 079 BPM     P-R Int : 210 ms          QRS Dur : 150 ms      QT Int : 448 ms       P-R-T Axes : 052 -60 047 degrees     QTc Int : 513 ms    Sinus rhythm with 1st degree A-V block  Left axis deviation  Right bundle branch block  Possible Lateral infarct ,age undetermined  Abnormal ECG  When compared with ECG of 08-DEC-2022 12:53,  Borderline criteria for Lateral infarct are now Present    Referred By: [email or web address]   SELF           Confirmed By:            ASSESSMENT/PLAN:     Active Hospital Problems    Diagnosis    *Chest pain    Diabetes mellitus type 2 in obese    Acute renal failure superimposed on stage 3 chronic kidney disease    COPD (chronic obstructive pulmonary disease)    Hypertension    Tobacco abuse    Morbid obesity    Hypothyroidism       ASSESSMENT & PLAN:     Chest Pain- Recent negative stress, negative troponin   DM 2  Morbid Obesity  HTN  Tobacco use  COPD  Thyroid Dysfunction  YULY on CKD      RECOMMENDATIONS:    Stable for dc home from cardiac standpoint. Recommend close follow up with primary cardiologist.     Geneva Holt NP  Department of  Cardiology  Date of Service: 06/26/2023      I have personally interviewed and examined the patient, I have reviewed the Nurse Practitioner's history and physical, assessment, and plan. I have personally evaluated the patient at bedside and agree with the findings and made appropriate changes as necessary in recommendations.  Patient remained chest pain-free even with ambulation.  Medical management in view of high risk of contrast induced nephropathy and recent negative stress test.  Recommended close outpatient follow up with Dr. Lopez.     Alexa Nguyen MD  Department of Cardiology  [facility] hospital  6/26/23

## 2023-06-26 NOTE — CARE UPDATE
06/25/23 2011   Patient Assessment/Suction   Level of Consciousness (AVPU) alert   Respiratory Effort Normal;Unlabored   Expansion/Accessory Muscles/Retractions no retractions;no use of accessory muscles   All Lung Fields Breath Sounds diminished;clear   PRE-TX-O2   Device (Oxygen Therapy) room air   SpO2 95 %   Pulse Oximetry Type Intermittent   $ Pulse Oximetry - Multiple Charge Pulse Oximetry - Multiple   Aerosol Therapy   $ Aerosol Therapy Charges PRN treatment not required   Respiratory Treatment Status (SVN) PRN treatment not required   Education   $ Education Bronchodilator;15 min

## 2023-06-26 NOTE — PLAN OF CARE
06/26/23 1246   Final Note   Assessment Type Final Discharge Note   Anticipated Discharge Disposition Home   What phone number can be called within the next 1-3 days to see how you are doing after discharge? [phone]   Post-Acute Status   Post-Acute Authorization Other   Other Status No Post-Acute Service Needs   Discharge Delays None known at this time     Patient cleared for discharge from case management standpoint.    Follow up appointments scheduled and added to AVS.    Chart and discharge orders reviewed.  Patient discharged home with no further case management needs.

## 2023-06-26 NOTE — CARE UPDATE
06/26/23 0846   Patient Assessment/Suction   Level of Consciousness (AVPU) alert   Respiratory Effort Normal;Unlabored   Expansion/Accessory Muscles/Retractions no use of accessory muscles;expansion symmetric;no retractions   All Lung Fields Breath Sounds clear   Rhythm/Pattern, Respiratory unlabored;pattern regular;depth regular;chest wiggle adequate;no shortness of breath reported   Cough Frequency no cough   PRE-TX-O2   Device (Oxygen Therapy) room air   SpO2 96 %   Pulse Oximetry Type Intermittent   $ Pulse Oximetry - Multiple Charge Pulse Oximetry - Multiple   Pulse 78   Resp 19   Aerosol Therapy   $ Aerosol Therapy Charges PRN treatment not required   Respiratory Evaluation   $ Care Plan Tech Time 15 min

## 2023-06-26 NOTE — DISCHARGE SUMMARY
[facility]  Discharge Summary  Patient Name: Mary Cynthia Cryer MRN: [id number]   Patient Class: OP- Observation  Length of Stay: 0   Admission Date: 6/24/2023  2:44 PM Attending Physician: Abdirashid Hemphill MD   Primary Care Provider: Emmy Piña MD Face-to-Face encounter date: 06/26/2023   Chief Complaint: Chest Pain (Reported chest pain 10/10 at home with room air sat of 88%. Currently 95% on room air. Chest pain 3/10 after nitro spray. 0/10 after second nitro spray.)    Date of Discharge: 6/26/2023  Discharge Disposition:Home or Self Care    Condition: Stable       Reason for Hospitalization     Active Hospital Problems    Diagnosis    *Chest pain    Diabetes mellitus type 2 in obese    Acute renal failure superimposed on stage 3 chronic kidney disease    COPD (chronic obstructive pulmonary disease)    Hypertension    Tobacco abuse    Morbid obesity    Hypothyroidism         Brief History of Present Illness    Mary Cynthia Cryer is a 82 y.o.  female who  has a past medical history of Allergy, Anticoagulant long-term use, Diabetes mellitus, Diabetes mellitus, type 2, Disorder of kidney and ureter, Hyperlipidemia, Hypertension, Obesity, Peripheral vascular disease, Thyroid disease, and Type 2 diabetes mellitus.. The patient presented to [facility] on 6/24/2023 with a primary complaint of Chest Pain (Reported chest pain 10/10 at home with room air sat of 88%. Currently 95% on room air. Chest pain 3/10 after nitro spray. 0/10 after second nitro spray.)  .     For the full HPI please refer to the History & Physical from this admission.    Hospital Course By Problem with Pertinent Findings     Admitted for chest pain rule out. Serial cardiac enzymes negatives. Cardiology adjusted long acting nitrates. Cleared for discharge as patient chest pain free for 48 hours. Will need LHC if chest pain reoccurs that can be arranged by primary cardiologist.       Patient was seen and  "examined on the date of discharge and determined to be suitable for discharge.    Physical Exam  /72 (BP Location: Right arm, Patient Position: Sitting)   Pulse 68   Temp 97.4 °F (36.3 °C) (Oral)   Resp 20   Ht 5' 8" (1.727 m)   Wt 115 kg (253 lb 8.5 oz)   SpO2 (!) 93%   BMI 38.55 kg/m²   Vitals reviewed.    Constitutional: No distress.   HENT: Atraumatic.   Cardiovascular: Normal rate, regular rhythm and normal heart sounds.   Pulmonary/Chest: Effort normal. Clear to auscultation bilaterally. No wheezes.   Abdominal: Soft. Bowel sounds are normal. Exhibits no distension and no mass. No tenderness  Neurological: Alert.   Skin: Skin is warm and dry.     Following labs were Reviewed   Recent Labs   Lab 06/26/23  0407   WBC 6.51   HGB 12.6   HCT 38.2      CALCIUM 8.9   ALBUMIN 3.1*   PROT 6.1      K 3.2*   CO2 27      BUN 20   CREATININE 1.8*   ALKPHOS 59   ALT 12   AST 14   BILITOT 0.5     No results found for: POCTGLUCOSE     All labs within the past 24 hours have been reviewed    Microbiology Results (last 7 days)       ** No results found for the last 168 hours. **          US Kidney   Final Result      X-Ray Chest AP Portable   Final Result          No results found for this or any previous visit.      Consultants and Procedures   Consultants:  Consults (From admission, onward)          Status Ordering Provider     Inpatient consult to Cardiology  Once        Provider:  Alexa Nguyen MD    Completed DONAVAN LAMAS     Inpatient consult to Cardiology  Once        Provider:  (Not yet assigned)    MILDRED Bradford            Procedures:   * No surgery found *     Discharge Information:   Diet:  Resume  cardiac diet    Physical Activity:  Activity as tolerated    Instructions:  1. Take all medications as prescribed  2. Keep all follow-up appointments  3. Return to the hospital or call your primary care physicians if any worsening symptoms such as chest pain, " palpitations, shortness of breath occur.    Follow-Up Appointments:  Please call your primary care physician to schedule an appointment in 1 week time.      Pending laboratory work/Tests to be performed/followed by the Primary care Physician: None    The patient was discharged in the care of her parents//wife/family/caregiver, with discharge instructions were reviewed in written and verbal form. All pertinent questions were discussed and prescriptions were provided. The importance of making follow up appointments and compliance of medications has been stressed repeatedly. The patient will follow up in 1 week or sooner as needed with the PCP, and the patient is on board with the plan. Upon discharge, patient needs to be on following medications.    Discharge Medications:     Medication List        START taking these medications      isosorbide mononitrate 30 MG 24 hr tablet  Commonly known as: IMDUR  Take 1 tablet (30 mg total) by mouth once daily.  Start taking on: June 27, 2023            CHANGE how you take these medications      HYDROcodone-acetaminophen  mg per tablet  Commonly known as: NORCO  Take 1 tablet by mouth every 4 (four) hours as needed.  What changed: Another medication with the same name was removed. Continue taking this medication, and follow the directions you see here.     lisinopriL 10 MG tablet  What changed: Another medication with the same name was removed. Continue taking this medication, and follow the directions you see here.            CONTINUE taking these medications      albuterol 90 mcg/actuation inhaler  Commonly known as: PROVENTIL/VENTOLIN HFA     amLODIPine 5 MG tablet  Commonly known as: NORVASC     aspirin 81 MG EC tablet  Commonly known as: ECOTRIN     cilostazoL 100 MG Tab  Commonly known as: PLETAL     clopidogreL 75 mg tablet  Commonly known as: PLAVIX     collagenase ointment  Commonly known as: SANTYL  Apply topically once daily.     DROPLET INSULIN SYRINGE 1  "mL 31 gauge x 15/64" Syrg  Generic drug: insulin syringe-needle U-100     gabapentin 300 MG capsule  Commonly known as: NEURONTIN     insulin glargine 100 unit/mL injection  Commonly known as: Lantus     insulin syringe-needle U-100 0.3 mL 30 gauge x 5/16" Syrg     levothyroxine 25 MCG tablet  Commonly known as: SYNTHROID     metFORMIN 500 MG tablet  Commonly known as: GLUCOPHAGE     metoprolol tartrate 50 MG tablet  Commonly known as: LOPRESSOR     MYRBETRIQ 25 mg Tb24 ER tablet  Generic drug: mirabegron     nitroGLYCERIN 0.3 MG SL tablet  Commonly known as: NITROSTAT     NovoLIN R Regular U-100 Insuln 100 unit/mL injection  Generic drug: insulin regular     pantoprazole 40 MG tablet  Commonly known as: PROTONIX     rosuvastatin 40 MG Tab  Commonly known as: CRESTOR     solifenacin 10 MG tablet  Commonly known as: VESICARE     traMADoL 50 mg tablet  Commonly known as: ULTRAM     triamterene-hydrochlorothiazide 37.5-25 mg 37.5-25 mg per tablet  Commonly known as: MAXZIDE-25     TRUE METRIX GLUCOSE TEST STRIP Strp  Generic drug: blood sugar diagnostic     TRUEPLUS LANCETS 30 gauge Misc  Generic drug: lancets            STOP taking these medications      alcohol swabs Padm     BD ALCOHOL SWABS Padm  Generic drug: alcohol swabs     metoprolol succinate 50 MG 24 hr tablet  Commonly known as: TOPROL-XL               Where to Get Your Medications        These medications were sent to [facility] - [address] [address]      Phone: [phone]   isosorbide mononitrate 30 MG 24 hr tablet           I spent 30 minutes preparing the discharge including reviewing records from previous encounters, preparation of discharge summary, assessing and final examination of the patient, discharge medicine reconciliation, discussing plan of care, follow up and education and prescriptions.       Abdirashid Hemphill  [facility] Hospitalist  06/26/2023          "

## 2023-06-29 ENCOUNTER — LAB VISIT (OUTPATIENT)
Dept: LAB | Facility: HOSPITAL | Age: 83
End: 2023-06-29
Attending: STUDENT IN AN ORGANIZED HEALTH CARE EDUCATION/TRAINING PROGRAM
Payer: MEDICARE

## 2023-06-29 ENCOUNTER — TELEPHONE (OUTPATIENT)
Dept: PRIMARY CARE CLINIC | Facility: CLINIC | Age: 83
End: 2023-06-29

## 2023-06-29 ENCOUNTER — OFFICE VISIT (OUTPATIENT)
Dept: PRIMARY CARE CLINIC | Facility: CLINIC | Age: 83
End: 2023-06-29
Payer: MEDICARE

## 2023-06-29 VITALS
HEIGHT: 68 IN | OXYGEN SATURATION: 94 % | WEIGHT: 251.31 LBS | DIASTOLIC BLOOD PRESSURE: 60 MMHG | HEART RATE: 90 BPM | SYSTOLIC BLOOD PRESSURE: 136 MMHG | BODY MASS INDEX: 38.09 KG/M2 | TEMPERATURE: 98 F

## 2023-06-29 DIAGNOSIS — N17.9 AKI (ACUTE KIDNEY INJURY): ICD-10-CM

## 2023-06-29 DIAGNOSIS — R07.9 CHEST PAIN, UNSPECIFIED TYPE: Primary | ICD-10-CM

## 2023-06-29 DIAGNOSIS — E87.6 HYPOKALEMIA: ICD-10-CM

## 2023-06-29 DIAGNOSIS — Z09 HOSPITAL DISCHARGE FOLLOW-UP: ICD-10-CM

## 2023-06-29 DIAGNOSIS — Z79.899 ON POTASSIUM WASTING DIURETIC THERAPY: ICD-10-CM

## 2023-06-29 DIAGNOSIS — I25.10 CORONARY ARTERY DISEASE WITHOUT ANGINA PECTORIS, UNSPECIFIED VESSEL OR LESION TYPE, UNSPECIFIED WHETHER NATIVE OR TRANSPLANTED HEART: ICD-10-CM

## 2023-06-29 LAB
ANION GAP SERPL CALC-SCNC: 11 MMOL/L (ref 8–16)
BUN SERPL-MCNC: 18 MG/DL (ref 8–23)
CALCIUM SERPL-MCNC: 9.2 MG/DL (ref 8.7–10.5)
CHLORIDE SERPL-SCNC: 102 MMOL/L (ref 95–110)
CO2 SERPL-SCNC: 22 MMOL/L (ref 23–29)
CREAT SERPL-MCNC: 1.9 MG/DL (ref 0.5–1.4)
EST. GFR  (NO RACE VARIABLE): 26 ML/MIN/1.73 M^2
GLUCOSE SERPL-MCNC: 155 MG/DL (ref 70–110)
POTASSIUM SERPL-SCNC: 3.3 MMOL/L (ref 3.5–5.1)
SODIUM SERPL-SCNC: 135 MMOL/L (ref 136–145)

## 2023-06-29 PROCEDURE — 99214 PR OFFICE/OUTPT VISIT, EST, LEVL IV, 30-39 MIN: ICD-10-PCS | Mod: S$GLB,,, | Performed by: STUDENT IN AN ORGANIZED HEALTH CARE EDUCATION/TRAINING PROGRAM

## 2023-06-29 PROCEDURE — 1160F PR REVIEW ALL MEDS BY PRESCRIBER/CLIN PHARMACIST DOCUMENTED: ICD-10-PCS | Mod: CPTII,S$GLB,, | Performed by: STUDENT IN AN ORGANIZED HEALTH CARE EDUCATION/TRAINING PROGRAM

## 2023-06-29 PROCEDURE — 99999 PR PBB SHADOW E&M-EST. PATIENT-LVL V: CPT | Mod: PBBFAC,,,

## 2023-06-29 PROCEDURE — 36415 COLL VENOUS BLD VENIPUNCTURE: CPT | Performed by: STUDENT IN AN ORGANIZED HEALTH CARE EDUCATION/TRAINING PROGRAM

## 2023-06-29 PROCEDURE — 3075F SYST BP GE 130 - 139MM HG: CPT | Mod: CPTII,S$GLB,, | Performed by: STUDENT IN AN ORGANIZED HEALTH CARE EDUCATION/TRAINING PROGRAM

## 2023-06-29 PROCEDURE — 1100F PTFALLS ASSESS-DOCD GE2>/YR: CPT | Mod: CPTII,S$GLB,, | Performed by: STUDENT IN AN ORGANIZED HEALTH CARE EDUCATION/TRAINING PROGRAM

## 2023-06-29 PROCEDURE — 1126F AMNT PAIN NOTED NONE PRSNT: CPT | Mod: CPTII,S$GLB,, | Performed by: STUDENT IN AN ORGANIZED HEALTH CARE EDUCATION/TRAINING PROGRAM

## 2023-06-29 PROCEDURE — 99999 PR PBB SHADOW E&M-EST. PATIENT-LVL V: ICD-10-PCS | Mod: PBBFAC,,,

## 2023-06-29 PROCEDURE — 99214 OFFICE O/P EST MOD 30 MIN: CPT | Mod: S$GLB,,, | Performed by: STUDENT IN AN ORGANIZED HEALTH CARE EDUCATION/TRAINING PROGRAM

## 2023-06-29 PROCEDURE — 3288F FALL RISK ASSESSMENT DOCD: CPT | Mod: CPTII,S$GLB,, | Performed by: STUDENT IN AN ORGANIZED HEALTH CARE EDUCATION/TRAINING PROGRAM

## 2023-06-29 PROCEDURE — 80048 BASIC METABOLIC PNL TOTAL CA: CPT | Performed by: STUDENT IN AN ORGANIZED HEALTH CARE EDUCATION/TRAINING PROGRAM

## 2023-06-29 PROCEDURE — 1159F PR MEDICATION LIST DOCUMENTED IN MEDICAL RECORD: ICD-10-PCS | Mod: CPTII,S$GLB,, | Performed by: STUDENT IN AN ORGANIZED HEALTH CARE EDUCATION/TRAINING PROGRAM

## 2023-06-29 PROCEDURE — 1159F MED LIST DOCD IN RCRD: CPT | Mod: CPTII,S$GLB,, | Performed by: STUDENT IN AN ORGANIZED HEALTH CARE EDUCATION/TRAINING PROGRAM

## 2023-06-29 PROCEDURE — 1160F RVW MEDS BY RX/DR IN RCRD: CPT | Mod: CPTII,S$GLB,, | Performed by: STUDENT IN AN ORGANIZED HEALTH CARE EDUCATION/TRAINING PROGRAM

## 2023-06-29 PROCEDURE — 3075F PR MOST RECENT SYSTOLIC BLOOD PRESS GE 130-139MM HG: ICD-10-PCS | Mod: CPTII,S$GLB,, | Performed by: STUDENT IN AN ORGANIZED HEALTH CARE EDUCATION/TRAINING PROGRAM

## 2023-06-29 PROCEDURE — 3078F DIAST BP <80 MM HG: CPT | Mod: CPTII,S$GLB,, | Performed by: STUDENT IN AN ORGANIZED HEALTH CARE EDUCATION/TRAINING PROGRAM

## 2023-06-29 PROCEDURE — 1100F PR PT FALLS ASSESS DOC 2+ FALLS/FALL W/INJURY/YR: ICD-10-PCS | Mod: CPTII,S$GLB,, | Performed by: STUDENT IN AN ORGANIZED HEALTH CARE EDUCATION/TRAINING PROGRAM

## 2023-06-29 PROCEDURE — 3288F PR FALLS RISK ASSESSMENT DOCUMENTED: ICD-10-PCS | Mod: CPTII,S$GLB,, | Performed by: STUDENT IN AN ORGANIZED HEALTH CARE EDUCATION/TRAINING PROGRAM

## 2023-06-29 PROCEDURE — 1126F PR PAIN SEVERITY QUANTIFIED, NO PAIN PRESENT: ICD-10-PCS | Mod: CPTII,S$GLB,, | Performed by: STUDENT IN AN ORGANIZED HEALTH CARE EDUCATION/TRAINING PROGRAM

## 2023-06-29 PROCEDURE — 3078F PR MOST RECENT DIASTOLIC BLOOD PRESSURE < 80 MM HG: ICD-10-PCS | Mod: CPTII,S$GLB,, | Performed by: STUDENT IN AN ORGANIZED HEALTH CARE EDUCATION/TRAINING PROGRAM

## 2023-06-29 NOTE — TELEPHONE ENCOUNTER
I called and spoke with Ms. Cryer and let her know her renal function is stable from discharge and her potassium is still a little low.    BMP  Lab Results   Component Value Date     (L) 06/29/2023    K 3.3 (L) 06/29/2023     06/29/2023    CO2 22 (L) 06/29/2023    BUN 18 06/29/2023    CREATININE 1.9 (H) 06/29/2023    CALCIUM 9.2 06/29/2023    ANIONGAP 11 06/29/2023    EGFRNORACEVR 26 (A) 06/29/2023         I encouraged her to drink water to stay adequately hydrated and avoid prolonged heat exposure if possible. I also recommended her to take 3 tabs of her new potassium supplement at once today (which should be 20meq each) then take 1 daily after that starting tomorrow. She can get new lab work from her cardiologist next week and/or with her PCP at next follow up. Recommended to call with any questions/concerns.    Segundo Adam MD

## 2023-06-29 NOTE — PROGRESS NOTES
Transitional Care Note  Subjective:       Patient ID: Mary Cynthia Cryer is a 82 y.o. female.  Chief Complaint: Transitional Care    Family and/or Caretaker present at visit?  Yes caregiver  Diagnostic tests reviewed/disposition: I have reviewed all completed as well as pending diagnostic tests at the time of discharge.  Disease/illness education: Yes.  Home health/community services discussion/referrals: Patient does not have home health established from hospital visit.  They do not need home health.  If needed, we will set up home health for the patient. She has a caregiver.  Establishment or re-establishment of referral orders for community resources: No other necessary community resources.   Discussion with other health care providers: No discussion with other health care providers necessary.   82 year old female with PMH of HTN, HLD, CAD s/p CABG, COPD/asthma, tobacco abuse, IDDM2, PVD, and hypothyroidism who presents for hospital discharge follow up. She was hospitalized at [facility] from 6/24-6/26 for chest pain and YULY. Her chest pain resolved with nitro use and did not recur in the hospital. Cardiology saw the patient and added imdur to her regimen. Her high sensitivity troponins were WNL and she had recent echo and stress test within last 1 month that were also WNL. Her YULY improved with IVF in the hospital. Today she denies any further chest pain. She does report new mild lightheadedness she attributes to starting the new nitrate. Her caregiver is here with her. Patient uses a walker regularly and overall mostly stays in bed, chair, or sofa. She also reports not having her usual potassium supplement for the past week or so.    Review of Systems   Constitutional:  Positive for fatigue. Negative for chills and fever.   Respiratory:  Negative for cough and shortness of breath.    Cardiovascular:  Negative for chest pain, palpitations and leg swelling.   Gastrointestinal:  Negative for  abdominal pain, constipation, diarrhea, nausea and vomiting.   Neurological:  Positive for light-headedness.     Objective:      Physical Exam  Vitals reviewed.   Constitutional:       General: She is not in acute distress.     Appearance: She is obese. She is not ill-appearing.   HENT:      Head: Normocephalic and atraumatic.   Cardiovascular:      Rate and Rhythm: Regular rhythm. Tachycardia present.      Heart sounds: Normal heart sounds. No murmur heard.  Pulmonary:      Effort: Pulmonary effort is normal. No respiratory distress.      Breath sounds: Normal breath sounds. No wheezing, rhonchi or rales.   Musculoskeletal:      Right lower leg: No edema.      Left lower leg: No edema.   Skin:     General: Skin is warm and dry.      Capillary Refill: Capillary refill takes less than 2 seconds.   Neurological:      General: No focal deficit present.      Mental Status: She is alert and oriented to person, place, and time. Mental status is at baseline.   Psychiatric:         Mood and Affect: Affect normal.         Behavior: Behavior normal.         Thought Content: Thought content normal.       Assessment:       1. Chest pain, unspecified type    2. Hypokalemia    3. YULY (acute kidney injury)    4. On potassium wasting diuretic therapy    5. Coronary artery disease without angina pectoris, unspecified vessel or lesion type, unspecified whether native or transplanted heart    6. Hospital discharge follow-up        Plan:       1. Chest pain, unspecified type  Resolved. Has not recurred.  - continue new imdur. We discussed her side effect symptoms are mild and should improve. If they worsen she can consider stopping the medicine.  - has appointment with her cardiologist scheduled 7/3 for further discussion on this.    2. Hypokalemia  Was 3.2 on day of discharge and she reports drinking oral replacement prior to discharge. Also reports she has been out of her usual oral supplement so we will check BMP and refill her  supplement.  - potassium bicarbonate (K-LYTE) disintegrating tablet; Take 1 tablet (20 mEq total) by mouth once daily.  Dispense: 90 tablet; Refill: 1  - BASIC METABOLIC PANEL; Future    3. YULY (acute kidney injury)  Was improving at time of discharge but renal function was not completely to baseline. Recheck BMP.  - encouraged continued adequate hydration  - BASIC METABOLIC PANEL; Future    4. On potassium wasting diuretic therapy  - potassium bicarbonate (K-LYTE) disintegrating tablet; Take 1 tablet (20 mEq total) by mouth once daily.  Dispense: 90 tablet; Refill: 1    5. Coronary artery disease without angina pectoris, unspecified vessel or lesion type, unspecified whether native or transplanted heart  Chronic. Stable. Hx CABG.  - continue home asa, plavix, statin, imdur, BP control    6. Hospital discharge follow-up  - reviewed discharge summary and all aspects of her hospitalization.           Follow up with her cardiologist Dr. Lopez 7/3/23.  Otherwise recommend routine follow up with her PCP and lisa Adam MD  [facility]

## 2023-07-11 ENCOUNTER — HOSPITAL ENCOUNTER (OUTPATIENT)
Dept: RADIOLOGY | Facility: HOSPITAL | Age: 83
Discharge: HOME | End: 2023-07-11
Attending: SPECIALIST
Payer: MEDICARE

## 2023-07-11 DIAGNOSIS — N32.81 DETRUSOR INSTABILITY OF BLADDER: ICD-10-CM

## 2023-07-11 DIAGNOSIS — N39.3 FEMALE STRESS INCONTINENCE: ICD-10-CM

## 2023-09-05 ENCOUNTER — HOSPITAL ENCOUNTER (INPATIENT)
Facility: HOSPITAL | Age: 83
LOS: 2 days | Discharge: HOME | DRG: 254 | End: 2023-09-07
Attending: EMERGENCY MEDICINE | Admitting: STUDENT IN AN ORGANIZED HEALTH CARE EDUCATION/TRAINING PROGRAM
Payer: MEDICARE

## 2023-09-05 DIAGNOSIS — R52 PAIN: ICD-10-CM

## 2023-09-05 DIAGNOSIS — R20.2 PARESTHESIA: Primary | ICD-10-CM

## 2023-09-05 DIAGNOSIS — R07.9 CHEST PAIN: ICD-10-CM

## 2023-09-05 LAB
ALBUMIN SERPL BCP-MCNC: 3.6 G/DL (ref 3.5–5.2)
ALP SERPL-CCNC: 51 U/L (ref 55–135)
ALT SERPL W/O P-5'-P-CCNC: 15 U/L (ref 10–44)
ANION GAP SERPL CALC-SCNC: 9 MMOL/L (ref 8–16)
AST SERPL-CCNC: 24 U/L (ref 10–40)
BASOPHILS # BLD AUTO: 0.04 K/UL (ref 0–0.2)
BASOPHILS NFR BLD: 0.6 % (ref 0–1.9)
BILIRUB SERPL-MCNC: 0.7 MG/DL (ref 0.1–1)
BUN SERPL-MCNC: 21 MG/DL (ref 8–23)
CALCIUM SERPL-MCNC: 9.2 MG/DL (ref 8.7–10.5)
CHLORIDE SERPL-SCNC: 109 MMOL/L (ref 95–110)
CO2 SERPL-SCNC: 22 MMOL/L (ref 23–29)
CREAT SERPL-MCNC: 1.6 MG/DL (ref 0.5–1.4)
DIFFERENTIAL METHOD: ABNORMAL
EOSINOPHIL # BLD AUTO: 0.2 K/UL (ref 0–0.5)
EOSINOPHIL NFR BLD: 3.4 % (ref 0–8)
ERYTHROCYTE [DISTWIDTH] IN BLOOD BY AUTOMATED COUNT: 13.7 % (ref 11.5–14.5)
EST. GFR  (NO RACE VARIABLE): 32.2 ML/MIN/1.73 M^2
GLUCOSE SERPL-MCNC: 204 MG/DL (ref 70–110)
GLUCOSE SERPL-MCNC: 205 MG/DL (ref 70–110)
GLUCOSE SERPL-MCNC: 49 MG/DL (ref 70–110)
GLUCOSE SERPL-MCNC: 55 MG/DL (ref 70–110)
GLUCOSE SERPL-MCNC: 80 MG/DL (ref 70–110)
GLUCOSE SERPL-MCNC: 87 MG/DL (ref 70–110)
GLUCOSE SERPL-MCNC: 94 MG/DL (ref 70–110)
HCT VFR BLD AUTO: 39.3 % (ref 37–48.5)
HGB BLD-MCNC: 12.8 G/DL (ref 12–16)
IMM GRANULOCYTES # BLD AUTO: 0.03 K/UL (ref 0–0.04)
IMM GRANULOCYTES NFR BLD AUTO: 0.5 % (ref 0–0.5)
INR PPP: 1 (ref 0.8–1.2)
LACTATE SERPL-SCNC: 2.1 MMOL/L (ref 0.5–1.9)
LYMPHOCYTES # BLD AUTO: 1.9 K/UL (ref 1–4.8)
LYMPHOCYTES NFR BLD: 29.3 % (ref 18–48)
MAGNESIUM SERPL-MCNC: 1.9 MG/DL (ref 1.6–2.6)
MCH RBC QN AUTO: 33.2 PG (ref 27–31)
MCHC RBC AUTO-ENTMCNC: 32.6 G/DL (ref 32–36)
MCV RBC AUTO: 102 FL (ref 82–98)
MONOCYTES # BLD AUTO: 0.6 K/UL (ref 0.3–1)
MONOCYTES NFR BLD: 9.4 % (ref 4–15)
NEUTROPHILS # BLD AUTO: 3.7 K/UL (ref 1.8–7.7)
NEUTROPHILS NFR BLD: 56.8 % (ref 38–73)
NRBC BLD-RTO: 0 /100 WBC
PLATELET # BLD AUTO: 227 K/UL (ref 150–450)
PMV BLD AUTO: 9 FL (ref 9.2–12.9)
POTASSIUM SERPL-SCNC: 4.4 MMOL/L (ref 3.5–5.1)
PROT SERPL-MCNC: 6.6 G/DL (ref 6–8.4)
PROTHROMBIN TIME: 11.2 SEC (ref 9–12.5)
RBC # BLD AUTO: 3.85 M/UL (ref 4–5.4)
SODIUM SERPL-SCNC: 140 MMOL/L (ref 136–145)
TROPONIN I SERPL HS-MCNC: 11.9 PG/ML (ref 0–14.9)
WBC # BLD AUTO: 6.52 K/UL (ref 3.9–12.7)

## 2023-09-05 PROCEDURE — 82962 GLUCOSE BLOOD TEST: CPT

## 2023-09-05 PROCEDURE — 12000002 HC ACUTE/MED SURGE SEMI-PRIVATE ROOM

## 2023-09-05 PROCEDURE — 93010 ELECTROCARDIOGRAM REPORT: CPT | Mod: ,,, | Performed by: GENERAL PRACTICE

## 2023-09-05 PROCEDURE — 80053 COMPREHEN METABOLIC PANEL: CPT | Performed by: STUDENT IN AN ORGANIZED HEALTH CARE EDUCATION/TRAINING PROGRAM

## 2023-09-05 PROCEDURE — 36415 COLL VENOUS BLD VENIPUNCTURE: CPT | Performed by: STUDENT IN AN ORGANIZED HEALTH CARE EDUCATION/TRAINING PROGRAM

## 2023-09-05 PROCEDURE — 96374 THER/PROPH/DIAG INJ IV PUSH: CPT

## 2023-09-05 PROCEDURE — 99285 EMERGENCY DEPT VISIT HI MDM: CPT | Mod: 25

## 2023-09-05 PROCEDURE — 93005 ELECTROCARDIOGRAM TRACING: CPT | Performed by: GENERAL PRACTICE

## 2023-09-05 PROCEDURE — 85610 PROTHROMBIN TIME: CPT | Performed by: STUDENT IN AN ORGANIZED HEALTH CARE EDUCATION/TRAINING PROGRAM

## 2023-09-05 PROCEDURE — 84484 ASSAY OF TROPONIN QUANT: CPT | Performed by: STUDENT IN AN ORGANIZED HEALTH CARE EDUCATION/TRAINING PROGRAM

## 2023-09-05 PROCEDURE — 25000003 PHARM REV CODE 250

## 2023-09-05 PROCEDURE — 93010 EKG 12-LEAD: ICD-10-PCS | Mod: ,,, | Performed by: GENERAL PRACTICE

## 2023-09-05 PROCEDURE — 94761 N-INVAS EAR/PLS OXIMETRY MLT: CPT

## 2023-09-05 PROCEDURE — 83036 HEMOGLOBIN GLYCOSYLATED A1C: CPT | Performed by: STUDENT IN AN ORGANIZED HEALTH CARE EDUCATION/TRAINING PROGRAM

## 2023-09-05 PROCEDURE — 63600175 PHARM REV CODE 636 W HCPCS

## 2023-09-05 PROCEDURE — 85025 COMPLETE CBC W/AUTO DIFF WBC: CPT | Performed by: STUDENT IN AN ORGANIZED HEALTH CARE EDUCATION/TRAINING PROGRAM

## 2023-09-05 PROCEDURE — 99900035 HC TECH TIME PER 15 MIN (STAT)

## 2023-09-05 PROCEDURE — 83605 ASSAY OF LACTIC ACID: CPT | Performed by: STUDENT IN AN ORGANIZED HEALTH CARE EDUCATION/TRAINING PROGRAM

## 2023-09-05 PROCEDURE — S4991 NICOTINE PATCH NONLEGEND: HCPCS

## 2023-09-05 PROCEDURE — 83735 ASSAY OF MAGNESIUM: CPT | Performed by: STUDENT IN AN ORGANIZED HEALTH CARE EDUCATION/TRAINING PROGRAM

## 2023-09-05 RX ORDER — LEVOTHYROXINE SODIUM 25 UG/1
25 TABLET ORAL
Status: DISCONTINUED | OUTPATIENT
Start: 2023-09-06 | End: 2023-09-07 | Stop reason: HOSPADM

## 2023-09-05 RX ORDER — MELOXICAM 7.5 MG/1
1 TABLET ORAL DAILY
Status: ON HOLD | COMMUNITY
End: 2023-09-05

## 2023-09-05 RX ORDER — ACETAMINOPHEN 325 MG/1
650 TABLET ORAL EVERY 8 HOURS PRN
Status: DISCONTINUED | OUTPATIENT
Start: 2023-09-05 | End: 2023-09-07 | Stop reason: HOSPADM

## 2023-09-05 RX ORDER — GLIMEPIRIDE 4 MG/1
1 TABLET ORAL 2 TIMES DAILY
Status: ON HOLD | COMMUNITY
End: 2023-09-05

## 2023-09-05 RX ORDER — PANTOPRAZOLE SODIUM 40 MG/1
1 TABLET, DELAYED RELEASE ORAL DAILY
Status: ON HOLD | COMMUNITY
End: 2023-09-07 | Stop reason: HOSPADM

## 2023-09-05 RX ORDER — INSULIN ASPART 100 [IU]/ML
0-5 INJECTION, SOLUTION INTRAVENOUS; SUBCUTANEOUS EVERY 6 HOURS PRN
Status: DISCONTINUED | OUTPATIENT
Start: 2023-09-05 | End: 2023-09-07 | Stop reason: HOSPADM

## 2023-09-05 RX ORDER — TALC
6 POWDER (GRAM) TOPICAL NIGHTLY PRN
Status: DISCONTINUED | OUTPATIENT
Start: 2023-09-05 | End: 2023-09-07 | Stop reason: HOSPADM

## 2023-09-05 RX ORDER — DEXTROSE 50 % IN WATER (D50W) INTRAVENOUS SYRINGE
25
Status: COMPLETED | OUTPATIENT
Start: 2023-09-05 | End: 2023-09-05

## 2023-09-05 RX ORDER — ONDANSETRON 4 MG/1
8 TABLET, ORALLY DISINTEGRATING ORAL EVERY 8 HOURS PRN
Status: DISCONTINUED | OUTPATIENT
Start: 2023-09-05 | End: 2023-09-07 | Stop reason: HOSPADM

## 2023-09-05 RX ORDER — NAPROXEN SODIUM 220 MG/1
81 TABLET, FILM COATED ORAL DAILY
Status: ON HOLD | COMMUNITY
End: 2023-09-07 | Stop reason: HOSPADM

## 2023-09-05 RX ORDER — POTASSIUM CHLORIDE 7.45 MG/ML
40 INJECTION INTRAVENOUS
Status: DISCONTINUED | OUTPATIENT
Start: 2023-09-05 | End: 2023-09-07 | Stop reason: HOSPADM

## 2023-09-05 RX ORDER — AMLODIPINE BESYLATE 5 MG/1
1 TABLET ORAL DAILY
Status: ON HOLD | COMMUNITY
End: 2023-09-07 | Stop reason: HOSPADM

## 2023-09-05 RX ORDER — GABAPENTIN 300 MG/1
300 CAPSULE ORAL 2 TIMES DAILY
Status: DISCONTINUED | OUTPATIENT
Start: 2023-09-05 | End: 2023-09-07 | Stop reason: HOSPADM

## 2023-09-05 RX ORDER — MIRABEGRON 50 MG/1
1 TABLET, FILM COATED, EXTENDED RELEASE ORAL DAILY
Status: ON HOLD | COMMUNITY
End: 2023-09-05

## 2023-09-05 RX ORDER — CILOSTAZOL 100 MG/1
100 TABLET ORAL 2 TIMES DAILY
Status: DISCONTINUED | OUTPATIENT
Start: 2023-09-05 | End: 2023-09-07 | Stop reason: HOSPADM

## 2023-09-05 RX ORDER — MAGNESIUM SULFATE HEPTAHYDRATE 40 MG/ML
4 INJECTION, SOLUTION INTRAVENOUS
Status: DISCONTINUED | OUTPATIENT
Start: 2023-09-05 | End: 2023-09-07 | Stop reason: HOSPADM

## 2023-09-05 RX ORDER — GLUCAGON 1 MG
1 KIT INJECTION
Status: DISCONTINUED | OUTPATIENT
Start: 2023-09-05 | End: 2023-09-07 | Stop reason: HOSPADM

## 2023-09-05 RX ORDER — METFORMIN HYDROCHLORIDE 500 MG/1
500 TABLET ORAL
Status: ON HOLD | COMMUNITY
End: 2023-09-07 | Stop reason: HOSPADM

## 2023-09-05 RX ORDER — CALCIUM GLUCONATE 20 MG/ML
1 INJECTION, SOLUTION INTRAVENOUS
Status: DISCONTINUED | OUTPATIENT
Start: 2023-09-05 | End: 2023-09-07 | Stop reason: HOSPADM

## 2023-09-05 RX ORDER — HYDROCODONE BITARTRATE AND ACETAMINOPHEN 5; 325 MG/1; MG/1
1 TABLET ORAL EVERY 4 HOURS PRN
Status: DISCONTINUED | OUTPATIENT
Start: 2023-09-05 | End: 2023-09-07 | Stop reason: HOSPADM

## 2023-09-05 RX ORDER — ONDANSETRON 2 MG/ML
4 INJECTION INTRAMUSCULAR; INTRAVENOUS EVERY 8 HOURS PRN
Status: DISCONTINUED | OUTPATIENT
Start: 2023-09-05 | End: 2023-09-07 | Stop reason: HOSPADM

## 2023-09-05 RX ORDER — POLYETHYLENE GLYCOL 3350 17 G/17G
17 POWDER, FOR SOLUTION ORAL DAILY
Status: DISCONTINUED | OUTPATIENT
Start: 2023-09-06 | End: 2023-09-07 | Stop reason: HOSPADM

## 2023-09-05 RX ORDER — ASPIRIN 81 MG/1
81 TABLET ORAL DAILY
Status: DISCONTINUED | OUTPATIENT
Start: 2023-09-06 | End: 2023-09-07 | Stop reason: HOSPADM

## 2023-09-05 RX ORDER — TRIAMTERENE AND HYDROCHLOROTHIAZIDE 37.5; 25 MG/1; MG/1
1 CAPSULE ORAL EVERY MORNING
Status: ON HOLD | COMMUNITY
End: 2023-09-07 | Stop reason: HOSPADM

## 2023-09-05 RX ORDER — METOPROLOL TARTRATE 50 MG/1
1 TABLET ORAL 2 TIMES DAILY
Status: ON HOLD | COMMUNITY
End: 2023-09-07 | Stop reason: HOSPADM

## 2023-09-05 RX ORDER — SODIUM CHLORIDE 0.9 % (FLUSH) 0.9 %
2 SYRINGE (ML) INJECTION
Status: DISCONTINUED | OUTPATIENT
Start: 2023-09-05 | End: 2023-09-07 | Stop reason: HOSPADM

## 2023-09-05 RX ORDER — CALCIUM GLUCONATE 20 MG/ML
2 INJECTION, SOLUTION INTRAVENOUS
Status: DISCONTINUED | OUTPATIENT
Start: 2023-09-05 | End: 2023-09-07 | Stop reason: HOSPADM

## 2023-09-05 RX ORDER — MAGNESIUM SULFATE HEPTAHYDRATE 40 MG/ML
2 INJECTION, SOLUTION INTRAVENOUS
Status: DISCONTINUED | OUTPATIENT
Start: 2023-09-05 | End: 2023-09-07 | Stop reason: HOSPADM

## 2023-09-05 RX ORDER — NITROGLYCERIN 0.3 MG/1
0.3 TABLET SUBLINGUAL EVERY 5 MIN PRN
Status: DISCONTINUED | OUTPATIENT
Start: 2023-09-05 | End: 2023-09-07 | Stop reason: HOSPADM

## 2023-09-05 RX ORDER — ROSUVASTATIN CALCIUM 40 MG/1
1 TABLET, COATED ORAL DAILY
Status: ON HOLD | COMMUNITY
End: 2023-09-07 | Stop reason: HOSPADM

## 2023-09-05 RX ORDER — SOLIFENACIN SUCCINATE 10 MG/1
1 TABLET, FILM COATED ORAL DAILY
COMMUNITY

## 2023-09-05 RX ORDER — ENOXAPARIN SODIUM 100 MG/ML
40 INJECTION SUBCUTANEOUS EVERY 24 HOURS
Status: DISCONTINUED | OUTPATIENT
Start: 2023-09-05 | End: 2023-09-07 | Stop reason: HOSPADM

## 2023-09-05 RX ORDER — CALCIUM GLUCONATE 20 MG/ML
3 INJECTION, SOLUTION INTRAVENOUS
Status: DISCONTINUED | OUTPATIENT
Start: 2023-09-05 | End: 2023-09-07 | Stop reason: HOSPADM

## 2023-09-05 RX ORDER — POTASSIUM CHLORIDE 600 MG/1
8 TABLET, FILM COATED, EXTENDED RELEASE ORAL DAILY
COMMUNITY
Start: 2023-08-22

## 2023-09-05 RX ORDER — GABAPENTIN 100 MG/1
100 CAPSULE ORAL 3 TIMES DAILY
COMMUNITY
Start: 2023-08-17

## 2023-09-05 RX ORDER — IBUPROFEN 200 MG
1 TABLET ORAL DAILY
Status: DISCONTINUED | OUTPATIENT
Start: 2023-09-05 | End: 2023-09-07 | Stop reason: HOSPADM

## 2023-09-05 RX ORDER — METOPROLOL TARTRATE 50 MG/1
50 TABLET ORAL 2 TIMES DAILY
Status: DISCONTINUED | OUTPATIENT
Start: 2023-09-05 | End: 2023-09-07 | Stop reason: HOSPADM

## 2023-09-05 RX ORDER — PANTOPRAZOLE SODIUM 40 MG/1
40 TABLET, DELAYED RELEASE ORAL
Status: DISCONTINUED | OUTPATIENT
Start: 2023-09-06 | End: 2023-09-07 | Stop reason: HOSPADM

## 2023-09-05 RX ORDER — ATORVASTATIN CALCIUM 40 MG/1
40 TABLET, FILM COATED ORAL DAILY
Status: DISCONTINUED | OUTPATIENT
Start: 2023-09-06 | End: 2023-09-07 | Stop reason: HOSPADM

## 2023-09-05 RX ORDER — LISINOPRIL 10 MG/1
10 TABLET ORAL DAILY
Status: DISCONTINUED | OUTPATIENT
Start: 2023-09-06 | End: 2023-09-07 | Stop reason: HOSPADM

## 2023-09-05 RX ORDER — HYDROCODONE BITARTRATE AND ACETAMINOPHEN 5; 325 MG/1; MG/1
1 TABLET ORAL
Status: ACTIVE | OUTPATIENT
Start: 2023-09-05 | End: 2023-09-06

## 2023-09-05 RX ORDER — AMLODIPINE BESYLATE 5 MG/1
5 TABLET ORAL DAILY
Status: DISCONTINUED | OUTPATIENT
Start: 2023-09-06 | End: 2023-09-07 | Stop reason: HOSPADM

## 2023-09-05 RX ORDER — LISINOPRIL 5 MG/1
1 TABLET ORAL DAILY
Status: ON HOLD | COMMUNITY
End: 2023-09-07 | Stop reason: HOSPADM

## 2023-09-05 RX ORDER — IPRATROPIUM BROMIDE AND ALBUTEROL SULFATE 2.5; .5 MG/3ML; MG/3ML
3 SOLUTION RESPIRATORY (INHALATION) EVERY 6 HOURS PRN
Status: DISCONTINUED | OUTPATIENT
Start: 2023-09-05 | End: 2023-09-07 | Stop reason: HOSPADM

## 2023-09-05 RX ORDER — ISOSORBIDE MONONITRATE 30 MG/1
30 TABLET, EXTENDED RELEASE ORAL DAILY
COMMUNITY
Start: 2023-06-27

## 2023-09-05 RX ORDER — LEVOTHYROXINE SODIUM 25 UG/1
1 TABLET ORAL DAILY
COMMUNITY

## 2023-09-05 RX ADMIN — DEXTROSE 50 % IN WATER (D50W) INTRAVENOUS SYRINGE 25 G: at 04:09

## 2023-09-05 RX ADMIN — DEXTROSE MONOHYDRATE 25 G: 25 INJECTION, SOLUTION INTRAVENOUS at 04:09

## 2023-09-05 RX ADMIN — NICOTINE 1 PATCH: 14 PATCH, EXTENDED RELEASE TRANSDERMAL at 10:09

## 2023-09-05 RX ADMIN — ENOXAPARIN SODIUM 40 MG: 40 INJECTION SUBCUTANEOUS at 10:09

## 2023-09-05 NOTE — Clinical Note
An angiography was performed of the proximal right superficial femoral artery via hand injection with 5 mL of contrast Contrast estimated. Multiple images taken

## 2023-09-05 NOTE — ASSESSMENT & PLAN NOTE
Patient had a blood glucose in the 40s on arrival to the ED and was given D5 normal which increased her blood sugar to the 200s  Patient returned to a blood glucose of 87   Diet NPO for vascular surgery in the morning  Continue to monitor blood sugar with POC Glucose   Sliding scale insulin ordered

## 2023-09-05 NOTE — ASSESSMENT & PLAN NOTE
Patient has a history of PVD and poor circulation in her bilateral lower extremities.   US of LE arteries done 8/24/2023 showed:    Right sided-common femoral artery remains patent. There is no evidence of pseudoaneurysm or significant groin hematoma. There is femoral popliteal and infrapopliteal chronic occlusive disease. There is chronic occlusion of the anterior tibial artery.    Left sided- There is chronic occlusion of the anterior tibial artery.   Bilateral LE US Today showed: monophasic flow bilaterally left greater than right. Unable to get a dorsalis pedis pulse on the right side    Consult to vascular surgery was placed- ED talked to Dr. Armas who said that they would see her in the morning  NPO tonight   Continue home Cilostazol

## 2023-09-05 NOTE — Clinical Note
An angiography was performed of the distal infrarenal abdominal aorta  via hand injection with 5 mL of contrast Contrast estimated,. Multiple images taken

## 2023-09-05 NOTE — H&P
Formerly [facility] - Emergency Dept  Hospital Medicine  History & Physical    Patient Name: Mary Cynthia Cryer  MRN: [id number]  Patient Class: IP- Inpatient  Admission Date: 9/5/2023  Attending Physician: Dr. Broussard  Primary Care Provider: Emmy Horton MD         Patient information was obtained from patient and ER records.     Subjective:     Principal Problem:PVD (peripheral vascular disease)    Chief Complaint:   Chief Complaint   Patient presents with    Numbness     Pt had angiogram done a week ago and now has numbness, tingling, and pain in right leg. Pt also states her right leg feels colder.          HPI: 84 y/o female with history of PVD, DM, HLD, and kidney disease presents to the ED for complaints of increasing numbness, tingling, and pain in her RLE. Patient had an angiogram 1 week prior to presentation. Patient states that she also feels like her right leg has been colder for the last day or so. Patient denies any SOB, coughing, hemoptysis, or left leg pain.       Past Medical History:   Diagnosis Date    Allergy     Anticoagulant long-term use     Diabetes mellitus     Diabetes mellitus, type 2     Disorder of kidney and ureter     Hyperlipidemia     Hypertension     Obesity     Peripheral vascular disease     Thyroid disease     Type 2 diabetes mellitus        Past Surgical History:   Procedure Laterality Date    ANGIOGRAPHY OF LOWER EXTREMITY N/A 12/11/2019    Procedure: Angiogram Extremity Unilateral;  Surgeon: Cruz Armas MD;  Location: [facility] CATH/EP LAB;  Service: Cardiology;  Laterality: N/A;    ANKLE HARDWARE REMOVAL Right 12/15/2022    Procedure: REMOVAL, HARDWARE, ANKLE;  Surgeon: Nae Jerry MD;  Location: [facility] OR;  Service: Orthopedics;  Laterality: Right;    CORONARY ARTERY BYPASS GRAFT      HYSTERECTOMY      partial    IRRIGATION AND DEBRIDEMENT OF LOWER EXTREMITY Right 12/15/2022    Procedure: IRRIGATION AND DEBRIDEMENT, LOWER EXTREMITY;  Surgeon: Nae Jerry  "MD;  Location: [facility] OR;  Service: Orthopedics;  Laterality: Right;    OPEN REDUCTION AND INTERNAL FIXATION (ORIF) OF INJURY OF ANKLE Right 7/20/2022    Procedure: ORIF, ANKLE- medial mallelous;  Surgeon: Nae Jerry MD;  Location: [facility];  Service: Orthopedics;  Laterality: Right;    PERIPHERAL ANGIOGRAPHY  12/11/2019       Review of patient's allergies indicates:   Allergen Reactions    Latex Rash    Tetracyclines Swelling       Current Facility-Administered Medications on File Prior to Encounter   Medication    gentian violet 1 % topical solution 0.5 mL     Current Outpatient Medications on File Prior to Encounter   Medication Sig    albuterol (PROVENTIL/VENTOLIN HFA) 90 mcg/actuation inhaler Inhale 2 puffs into the lungs every 6 (six) hours as needed.    amLODIPine (NORVASC) 5 MG tablet Take 5 mg by mouth once daily.    aspirin (ECOTRIN) 81 MG EC tablet Take 81 mg by mouth once daily.     cilostazoL (PLETAL) 100 MG Tab Take 100 mg by mouth 2 (two) times daily.    clopidogreL (PLAVIX) 75 mg tablet Take 75 mg by mouth once daily.    collagenase (SANTYL) ointment Apply topically once daily.    DROPLET INSULIN SYRINGE 1 mL 31 gauge x 15/64" Syrg     gabapentin (NEURONTIN) 300 MG capsule Take 300 mg by mouth 2 (two) times daily.     insulin glargine (LANTUS) 100 unit/mL injection Inject 35 Units into the skin once daily. And takes 20 units q pm    insulin syringe-needle U-100 0.3 mL 30 gauge x 5/16" Syrg by Other route.    isosorbide mononitrate (IMDUR) 30 MG 24 hr tablet Take 1 tablet (30 mg total) by mouth once daily.    levothyroxine (SYNTHROID) 25 MCG tablet Take 25 mcg by mouth once daily.    lisinopriL 10 MG tablet Take 10 mg by mouth once daily.    metformin (GLUCOPHAGE) 500 MG tablet Take 500 mg by mouth 2 (two) times daily with meals.    metoprolol tartrate (LOPRESSOR) 50 MG tablet Take 50 mg by mouth 2 (two) times daily.    MYRBETRIQ 25 mg Tb24 ER tablet Take 25 mg by mouth once daily.    nitroGLYCERIN " (NITROSTAT) 0.3 MG SL tablet Place 0.3 mg under the tongue every 5 (five) minutes as needed.    NOVOLIN R REGULAR U-100 INSULN 100 unit/mL injection Inject 6 Units into the skin 2 (two) times daily before meals.    pantoprazole (PROTONIX) 40 MG tablet Take 40 mg by mouth once daily.    potassium bicarbonate (K-LYTE) disintegrating tablet Take 1 tablet (20 mEq total) by mouth once daily.    rosuvastatin (CRESTOR) 40 MG Tab Take 40 mg by mouth once daily.    solifenacin (VESICARE) 10 MG tablet Take 10 mg by mouth once daily.    traMADoL (ULTRAM) 50 mg tablet Take 50 mg by mouth every 8 (eight) hours as needed.    triamterene-hydrochlorothiazide 37.5-25 mg (MAXZIDE-25) 37.5-25 mg per tablet Take 1 tablet by mouth once daily.     TRUE METRIX GLUCOSE TEST STRIP Strp     TRUEPLUS LANCETS 30 gauge Misc      Family History       Problem Relation (Age of Onset)    Alzheimer's disease Mother    Diabetes Brother    Heart disease Father, Brother    No Known Problems Sister, Daughter, Son, Sister, Sister, Sister, Sister, Brother, Son, Son          Tobacco Use    Smoking status: Every Day     Current packs/day: 0.50     Average packs/day: 0.5 packs/day for 60.0 years (30.0 ttl pk-yrs)     Types: Cigarettes    Smokeless tobacco: Never   Substance and Sexual Activity    Alcohol use: No    Drug use: No    Sexual activity: Never     Review of Systems   Constitutional:  Negative for activity change, appetite change, chills, diaphoresis, fatigue and fever.   HENT:  Negative for congestion, ear discharge, ear pain, postnasal drip, rhinorrhea, sinus pressure and sore throat.    Eyes:  Negative for pain, discharge, redness and itching.   Respiratory:  Negative for cough, chest tightness and shortness of breath.    Cardiovascular:  Negative for chest pain and leg swelling.   Gastrointestinal:  Negative for abdominal pain, constipation, diarrhea, nausea and vomiting.   Genitourinary:  Negative for dysuria, frequency, hematuria and urgency.    Musculoskeletal:  Negative for back pain.        Right leg pain   Skin:  Negative for pallor and rash.   Neurological:  Positive for numbness (and tingling in the RLE). Negative for dizziness, syncope, facial asymmetry, weakness and light-headedness.   Psychiatric/Behavioral:  Negative for behavioral problems, confusion and hallucinations.      Objective:     Vital Signs (Most Recent):  Temp: 97.7 °F (36.5 °C) (09/05/23 1340)  Pulse: 80 (09/05/23 1340)  Resp: 20 (09/05/23 1340)  BP: 129/64 (09/05/23 1340)  SpO2: (!) 93 % (09/05/23 1340) Vital Signs (24h Range):  Temp:  [97.7 °F (36.5 °C)] 97.7 °F (36.5 °C)  Pulse:  [80] 80  Resp:  [20] 20  SpO2:  [93 %] 93 %  BP: (129)/(64) 129/64     Weight: 114.3 kg (252 lb)  Body mass index is 38.32 kg/m².     Physical Exam  Vitals and nursing note reviewed.   Constitutional:       General: She is not in acute distress.     Appearance: She is obese. She is not ill-appearing, toxic-appearing or diaphoretic.   HENT:      Head: Normocephalic and atraumatic.      Nose: Nose normal.      Mouth/Throat:      Mouth: Mucous membranes are moist.   Eyes:      Extraocular Movements: Extraocular movements intact.   Cardiovascular:      Rate and Rhythm: Normal rate and regular rhythm.      Pulses: Decreased pulses (in the RLE. Unable to get dopplered pulses).      Heart sounds: Normal heart sounds. No murmur heard.  Pulmonary:      Effort: Pulmonary effort is normal. No respiratory distress.      Breath sounds: Normal breath sounds. No wheezing.   Abdominal:      General: Bowel sounds are normal.      Palpations: Abdomen is soft. There is no mass.      Tenderness: There is no abdominal tenderness. There is no guarding.      Hernia: No hernia is present.   Musculoskeletal:         General: No swelling, tenderness or deformity. Normal range of motion.      Cervical back: Normal range of motion. No rigidity or tenderness.   Skin:     General: Skin is warm and dry.      Coloration: Skin is not  jaundiced.      Findings: No bruising or erythema.   Neurological:      General: No focal deficit present.      Mental Status: She is alert and oriented to person, place, and time. Mental status is at baseline.   Psychiatric:         Mood and Affect: Mood normal.         Behavior: Behavior normal.                Significant Labs: All pertinent labs within the past 24 hours have been reviewed.  CBC:   Recent Labs   Lab 09/05/23  1507   WBC 6.52   HGB 12.8   HCT 39.3        CMP:   Recent Labs   Lab 09/05/23  1507      K 4.4      CO2 22*   GLU 49*   BUN 21   CREATININE 1.6*   CALCIUM 9.2   PROT 6.6   ALBUMIN 3.6   BILITOT 0.7   ALKPHOS 51*   AST 24   ALT 15   ANIONGAP 9     Lactic Acid:   Recent Labs   Lab 09/05/23  1507   LACTATE 2.1*         Significant Imaging: I have reviewed all pertinent imaging results/findings within the past 24 hours.    Assessment/Plan:     * PVD (peripheral vascular disease)  Patient has a history of PVD and poor circulation in her bilateral lower extremities.   US of LE arteries done 8/24/2023 showed:    Right sided-common femoral artery remains patent. There is no evidence of pseudoaneurysm or significant groin hematoma. There is femoral popliteal and infrapopliteal chronic occlusive disease. There is chronic occlusion of the anterior tibial artery.    Left sided- There is chronic occlusion of the anterior tibial artery.   Bilateral LE US Today showed: monophasic flow bilaterally left greater than right. Unable to get a dorsalis pedis pulse on the right side    Consult to vascular surgery was placed- ED talked to Dr. Armas who said that they would see her in the morning  NPO tonight   Continue home Cilostazol              Diabetes mellitus type 2 in obese  Patient had a blood glucose in the 40s on arrival to the ED and was given D5 normal which increased her blood sugar to the 200s  Patient returned to a blood glucose of 87   Diet NPO for vascular surgery in the  morning  Continue to monitor blood sugar with POC Glucose   Sliding scale insulin ordered    Tobacco abuse  Nicotine patches ordered       Hypertension  Continue home Amlodipine, Lisinopril, and Metoprolol tartate  Continue to monitor patient vitals       Hypothyroidism  Continue home Levothyroxine        VTE Risk Mitigation (From admission, onward)           Ordered     enoxaparin injection 40 mg  Daily         09/05/23 1825     IP VTE HIGH RISK PATIENT  Once         09/05/23 1825     Place sequential compression device  Until discontinued         09/05/23 1825                               Aysha Arvizu PA-C  Department of Hospital Medicine  [facility] - Emergency Dept

## 2023-09-05 NOTE — Clinical Note
An angiography was performed of the mid left common femoral artery via hand injection with 5 mL of contrast Contrast estimated. Multiple images taken.

## 2023-09-05 NOTE — ED PROVIDER NOTES
Encounter Date: 9/5/2023       History     Chief Complaint   Patient presents with    Numbness     Pt had angiogram done a week ago and now has numbness, tingling, and pain in right leg. Pt also states her right leg feels colder.       HPI  83F with hx of PVD presenting with chronic and progressive RLE rest pain x 3 days. No trauma, fever, chills, worsening swelling.   Review of patient's allergies indicates:   Allergen Reactions    Latex Rash    Tetracyclines Swelling     Past Medical History:   Diagnosis Date    Allergy     Anticoagulant long-term use     Diabetes mellitus     Diabetes mellitus, type 2     Disorder of kidney and ureter     Hyperlipidemia     Hypertension     Obesity     Peripheral vascular disease     Thyroid disease     Type 2 diabetes mellitus      Past Surgical History:   Procedure Laterality Date    ANGIOGRAM, EXTREMITY, UNILATERAL Right 9/6/2023    Procedure: ANGIOGRAM, EXTREMITY, UNILATERAL;  Surgeon: Cruz Armas MD;  Location: [facility] OR;  Service: Vascular;  Laterality: Right;    ANGIOGRAPHY OF LOWER EXTREMITY N/A 12/11/2019    Procedure: Angiogram Extremity Unilateral;  Surgeon: Cruz Armas MD;  Location: [facility] CATH/EP LAB;  Service: Cardiology;  Laterality: N/A;    ANGIOGRAPHY OF LOWER EXTREMITY Right 9/6/2023    Procedure: Angiogram Extremity Unilateral;  Surgeon: Cruz Armas MD;  Location: [facility] CATH/EP LAB;  Service: General;  Laterality: Right;    ANKLE HARDWARE REMOVAL Right 12/15/2022    Procedure: REMOVAL, HARDWARE, ANKLE;  Surgeon: Nae Jerry MD;  Location: [facility] OR;  Service: Orthopedics;  Laterality: Right;    CORONARY ARTERY BYPASS GRAFT      EMBOLECTOMY, LOWER EXTREMITY Right 9/6/2023    Procedure: EMBOLECTOMY, LOWER EXTREMITY;  Surgeon: Cruz Armas MD;  Location: [facility] OR;  Service: Vascular;  Laterality: Right;    HYSTERECTOMY      partial    IRRIGATION AND DEBRIDEMENT OF LOWER EXTREMITY Right 12/15/2022    Procedure: IRRIGATION AND DEBRIDEMENT, LOWER  EXTREMITY;  Surgeon: Nae Jerry MD;  Location: [facility] OR;  Service: Orthopedics;  Laterality: Right;    OPEN REDUCTION AND INTERNAL FIXATION (ORIF) OF INJURY OF ANKLE Right 7/20/2022    Procedure: ORIF, ANKLE- medial mallelous;  Surgeon: Nae Jerry MD;  Location: [facility] OR;  Service: Orthopedics;  Laterality: Right;    PERCUTANEOUS TRANSLUMINAL ANGIOPLASTY N/A 9/6/2023    Procedure: PTA (ANGIOPLASTY, PERCUTANEOUS, TRANSLUMINAL);  Surgeon: Cruz Armas MD;  Location: [facility] CATH/EP LAB;  Service: General;  Laterality: N/A;    PERIPHERAL ANGIOGRAPHY  12/11/2019     Family History   Problem Relation Age of Onset    Alzheimer's disease Mother     Heart disease Father         MI    No Known Problems Sister     Diabetes Brother     No Known Problems Daughter     No Known Problems Son     No Known Problems Sister     No Known Problems Sister     No Known Problems Sister     No Known Problems Sister     Heart disease Brother     No Known Problems Brother     No Known Problems Son     No Known Problems Son      Social History     Tobacco Use    Smoking status: Every Day     Current packs/day: 0.50     Average packs/day: 0.5 packs/day for 65.0 years (32.5 ttl pk-yrs)     Types: Cigarettes    Smokeless tobacco: Never    Tobacco comments:     Pt has a 65 year smoking history. Pt doesn't know at this time how much she smokes, it varies day to day. Currently has a patch on. [employer] member: [phone]    Substance Use Topics    Alcohol use: No    Drug use: No     Review of Systems   Constitutional:  Negative for activity change and chills.   HENT:  Negative for congestion and ear pain.    Eyes:  Negative for discharge and itching.   Respiratory:  Negative for cough and shortness of breath.    Cardiovascular:  Negative for chest pain and leg swelling.   Gastrointestinal:  Negative for abdominal distention and abdominal pain.   Genitourinary:  Negative for dysuria and hematuria.   Musculoskeletal:  Positive for  myalgias. Negative for arthralgias.   Skin:  Positive for color change and pallor.   Neurological:  Negative for dizziness and headaches.   Hematological:  Negative for adenopathy. Does not bruise/bleed easily.   Psychiatric/Behavioral:  Negative for agitation and behavioral problems.        Physical Exam     Initial Vitals [09/05/23 1340]   BP Pulse Resp Temp SpO2   129/64 80 20 97.7 °F (36.5 °C) (!) 93 %      MAP       --         Physical Exam    Constitutional: She appears well-developed and well-nourished.   HENT:   Head: Normocephalic and atraumatic.   Eyes: EOM are normal. Pupils are equal, round, and reactive to light.   Neck: Neck supple.   Normal range of motion.  Cardiovascular:            No palpable nor dopplerable DP pulse to RLE.    Pulmonary/Chest: Breath sounds normal.   Abdominal: Abdomen is soft.   Musculoskeletal:      Cervical back: Normal range of motion and neck supple.     Skin:   Dusky right foot.      Psychiatric: She has a normal mood and affect.         ED Course   Procedures  Labs Reviewed   CBC W/ AUTO DIFFERENTIAL - Abnormal; Notable for the following components:       Result Value    RBC 3.85 (*)      (*)     MCH 33.2 (*)     MPV 9.0 (*)     All other components within normal limits   COMPREHENSIVE METABOLIC PANEL - Abnormal; Notable for the following components:    CO2 22 (*)     Glucose 49 (*)     Creatinine 1.6 (*)     Alkaline Phosphatase 51 (*)     eGFR 32.2 (*)     All other components within normal limits    Narrative:     GLU critical result(s) called and verbal readback obtained from   Ale Dotson RN/ED  by JBNadja 09/05/2023 16:08   LACTIC ACID, PLASMA - Abnormal; Notable for the following components:    Lactate (Lactic Acid) 2.1 (*)     All other components within normal limits    Narrative:     LA critical result(s) called and verbal readback obtained from   Ael Dotson RN/ED  by JBNadja 09/05/2023 16:09   POCT GLUCOSE - Abnormal; Notable for the following  components:    POC Glucose 205 (*)     All other components within normal limits   POCT GLUCOSE - Abnormal; Notable for the following components:    POC Glucose 204 (*)     All other components within normal limits   POCT GLUCOSE - Abnormal; Notable for the following components:    POC Glucose 55 (*)     All other components within normal limits   PROTIME-INR   TROPONIN I HIGH SENSITIVITY   MAGNESIUM   HEMOGLOBIN A1C   POCT GLUCOSE   POCT GLUCOSE        ECG Results              EKG 12-lead (Final result)  Result time 09/17/23 09:53:15      Final result by Interface, Lab In [facility] (09/17/23 09:53:15)                   Narrative:    Test Reason : R07.9,    Vent. Rate : 076 BPM     Atrial Rate : 076 BPM     P-R Int : 208 ms          QRS Dur : 132 ms      QT Int : 430 ms       P-R-T Axes : 056 -11 058 degrees     QTc Int : 483 ms    Sinus rhythm with occasional Premature ventricular complexes  Right bundle branch block  Abnormal ECG  When compared with ECG of 24-JUN-2023 15:02,  Premature ventricular complexes are now Present  The axis Shifted right  Borderline criteria for Lateral infarct are no longer Present  Confirmed by Eric GREEN, Jono SAMUELS ([id number]) on 9/17/2023 9:53:03 AM    Referred By: [email or web address]   SELF           Confirmed By:Jono Reyes MD                                  Imaging Results              US Lower Extremity Arteries Bilateral (Final result)  Result time 09/05/23 18:56:17      Final result by Cresencio Lopez MD (09/05/23 18:56:17)                   Narrative:    History: Pain is only clinical history available.    FINDINGS: Bilateral lower extremity arterial duplex exam was performed by the sonographer. Static images are submitted. Atherosclerotic changes are noted throughout bilateral lower extremities.    Right lower extremity evaluation demonstrates a monophasic waveform in the visualized portions of the common femoral, superficial femoral, popliteal, posterior tibial, peroneal, and anterior  tibial arteries with progressively decreasing velocities throughout the right lower extremity. No blood flow is noted in the right dorsalis pedis artery.    Left lower extremity evaluation demonstrates biphasic waveform in the left common femoral, and superficial femoral artery. Predominantly monophasic waveform is noted in the left popliteal, peroneal, posterior tibial, and anterior tibial arteries. Monophasic waveform is noted in the left dorsalis pedis artery. No significantly elevated velocities are noted in the left lower extremity.    IMPRESSION:  1. Atherosclerotic changes throughout bilateral lower extremities.  2. Monophasic waveform throughout the right lower extremity with progressively decreasing velocity suggestive of a more central flow limiting stenosis.  3. Abnormal waveform in the left lower extremity arterial system, however, no evidence of large vessel left lower extremity arterial occlusion or significant stenosis is seen.    Electronically signed by:  Cresencio Lopez MD  9/5/2023 6:56 PM CDT Workstation: [id number]                                     Medications   HYDROcodone-acetaminophen 5-325 mg per tablet 1 tablet (1 tablet Oral Not Given 9/5/23 1430)   dextrose 50 % in water (D50W) injection 25 g (25 g Intravenous Given 9/5/23 1619)     Medical Decision Making  83F with progressive worsening RLE pain in setting of chronic limb ischemia with no doplerable  nor palpable pulse to RLE in setting of acute renal injury with creatinine of 1.6. Found to be hypoglycemic to 49-provided dextrose in ED with serial reassessments of blood sugar. Arterial ultrasound ordered. Vascular Surgery consulted with recommendations-inpatient admission to [facility]. Patient agrees to inpatient admission. All questions were answered.     Ezekiel Horton MD  [employer] EM HO4       Amount and/or Complexity of Data Reviewed  Labs: ordered. Decision-making details documented in ED  Course.    Risk  Prescription drug management.  Decision regarding hospitalization.              Attending Attestation:   Physician Attestation Statement for Resident:  As the supervising MD   Physician Attestation Statement: I have personally seen and examined this patient.   I agree with the above history.  -:   As the supervising MD I agree with the above PE.     As the supervising MD I agree with the above treatment, course, plan, and disposition.    I have reviewed and agree with the residents interpretation of the following: x-rays, lab data, CT scans and EKG.                 ED Course as of 09/30/23 1331   Tue Sep 05, 2023   1619 Glucose(!!): 49 [MO]   1619 Creatinine(!): 1.6 [MO]      ED Course User Index  [MO] Ezekiel Horton MD                    Clinical Impression:   Final diagnoses:  [R07.9] Chest pain  [R52] Pain  [R52] Pain - lower extremity pain  [R20.2] Paresthesia (Primary)        ED Disposition Condition    Admit Stable                Ezekiel Horton MD  Resident  09/08/23 0000       Diane Vergara MD  09/30/23 1331

## 2023-09-05 NOTE — SUBJECTIVE & OBJECTIVE
Past Medical History:   Diagnosis Date    Allergy     Anticoagulant long-term use     Diabetes mellitus     Diabetes mellitus, type 2     Disorder of kidney and ureter     Hyperlipidemia     Hypertension     Obesity     Peripheral vascular disease     Thyroid disease     Type 2 diabetes mellitus        Past Surgical History:   Procedure Laterality Date    ANGIOGRAPHY OF LOWER EXTREMITY N/A 12/11/2019    Procedure: Angiogram Extremity Unilateral;  Surgeon: Cruz Armas MD;  Location: [facility] CATH/EP LAB;  Service: Cardiology;  Laterality: N/A;    ANKLE HARDWARE REMOVAL Right 12/15/2022    Procedure: REMOVAL, HARDWARE, ANKLE;  Surgeon: Nae Jerry MD;  Location: [facility] OR;  Service: Orthopedics;  Laterality: Right;    CORONARY ARTERY BYPASS GRAFT      HYSTERECTOMY      partial    IRRIGATION AND DEBRIDEMENT OF LOWER EXTREMITY Right 12/15/2022    Procedure: IRRIGATION AND DEBRIDEMENT, LOWER EXTREMITY;  Surgeon: Nae Jerry MD;  Location: [facility] OR;  Service: Orthopedics;  Laterality: Right;    OPEN REDUCTION AND INTERNAL FIXATION (ORIF) OF INJURY OF ANKLE Right 7/20/2022    Procedure: ORIF, ANKLE- medial mallelous;  Surgeon: Nae Jerry MD;  Location: [facility] OR;  Service: Orthopedics;  Laterality: Right;    PERIPHERAL ANGIOGRAPHY  12/11/2019       Review of patient's allergies indicates:   Allergen Reactions    Latex Rash    Tetracyclines Swelling       Current Facility-Administered Medications on File Prior to Encounter   Medication    gentian violet 1 % topical solution 0.5 mL     Current Outpatient Medications on File Prior to Encounter   Medication Sig    albuterol (PROVENTIL/VENTOLIN HFA) 90 mcg/actuation inhaler Inhale 2 puffs into the lungs every 6 (six) hours as needed.    amLODIPine (NORVASC) 5 MG tablet Take 5 mg by mouth once daily.    aspirin (ECOTRIN) 81 MG EC tablet Take 81 mg by mouth once daily.     cilostazoL (PLETAL) 100 MG Tab Take 100 mg by mouth 2 (two) times daily.    clopidogreL (PLAVIX) 75  "mg tablet Take 75 mg by mouth once daily.    collagenase (SANTYL) ointment Apply topically once daily.    DROPLET INSULIN SYRINGE 1 mL 31 gauge x 15/64" Syrg     gabapentin (NEURONTIN) 300 MG capsule Take 300 mg by mouth 2 (two) times daily.     insulin glargine (LANTUS) 100 unit/mL injection Inject 35 Units into the skin once daily. And takes 20 units q pm    insulin syringe-needle U-100 0.3 mL 30 gauge x 5/16" Syrg by Other route.    isosorbide mononitrate (IMDUR) 30 MG 24 hr tablet Take 1 tablet (30 mg total) by mouth once daily.    levothyroxine (SYNTHROID) 25 MCG tablet Take 25 mcg by mouth once daily.    lisinopriL 10 MG tablet Take 10 mg by mouth once daily.    metformin (GLUCOPHAGE) 500 MG tablet Take 500 mg by mouth 2 (two) times daily with meals.    metoprolol tartrate (LOPRESSOR) 50 MG tablet Take 50 mg by mouth 2 (two) times daily.    MYRBETRIQ 25 mg Tb24 ER tablet Take 25 mg by mouth once daily.    nitroGLYCERIN (NITROSTAT) 0.3 MG SL tablet Place 0.3 mg under the tongue every 5 (five) minutes as needed.    NOVOLIN R REGULAR U-100 INSULN 100 unit/mL injection Inject 6 Units into the skin 2 (two) times daily before meals.    pantoprazole (PROTONIX) 40 MG tablet Take 40 mg by mouth once daily.    potassium bicarbonate (K-LYTE) disintegrating tablet Take 1 tablet (20 mEq total) by mouth once daily.    rosuvastatin (CRESTOR) 40 MG Tab Take 40 mg by mouth once daily.    solifenacin (VESICARE) 10 MG tablet Take 10 mg by mouth once daily.    traMADoL (ULTRAM) 50 mg tablet Take 50 mg by mouth every 8 (eight) hours as needed.    triamterene-hydrochlorothiazide 37.5-25 mg (MAXZIDE-25) 37.5-25 mg per tablet Take 1 tablet by mouth once daily.     TRUE METRIX GLUCOSE TEST STRIP Strp     TRUEPLUS LANCETS 30 gauge Misc      Family History       Problem Relation (Age of Onset)    Alzheimer's disease Mother    Diabetes Brother    Heart disease Father, Brother    No Known Problems Sister, Daughter, Son, Sister, Sister, " Sister, Sister, Brother, Son, Son          Tobacco Use    Smoking status: Every Day     Current packs/day: 0.50     Average packs/day: 0.5 packs/day for 60.0 years (30.0 ttl pk-yrs)     Types: Cigarettes    Smokeless tobacco: Never   Substance and Sexual Activity    Alcohol use: No    Drug use: No    Sexual activity: Never     Review of Systems   Constitutional:  Negative for activity change, appetite change, chills, diaphoresis, fatigue and fever.   HENT:  Negative for congestion, ear discharge, ear pain, postnasal drip, rhinorrhea, sinus pressure and sore throat.    Eyes:  Negative for pain, discharge, redness and itching.   Respiratory:  Negative for cough, chest tightness and shortness of breath.    Cardiovascular:  Negative for chest pain and leg swelling.   Gastrointestinal:  Negative for abdominal pain, constipation, diarrhea, nausea and vomiting.   Genitourinary:  Negative for dysuria, frequency, hematuria and urgency.   Musculoskeletal:  Negative for back pain.        Right leg pain   Skin:  Negative for pallor and rash.   Neurological:  Positive for numbness (and tingling in the RLE). Negative for dizziness, syncope, facial asymmetry, weakness and light-headedness.   Psychiatric/Behavioral:  Negative for behavioral problems, confusion and hallucinations.      Objective:     Vital Signs (Most Recent):  Temp: 97.7 °F (36.5 °C) (09/05/23 1340)  Pulse: 80 (09/05/23 1340)  Resp: 20 (09/05/23 1340)  BP: 129/64 (09/05/23 1340)  SpO2: (!) 93 % (09/05/23 1340) Vital Signs (24h Range):  Temp:  [97.7 °F (36.5 °C)] 97.7 °F (36.5 °C)  Pulse:  [80] 80  Resp:  [20] 20  SpO2:  [93 %] 93 %  BP: (129)/(64) 129/64     Weight: 114.3 kg (252 lb)  Body mass index is 38.32 kg/m².     Physical Exam  Vitals and nursing note reviewed.   Constitutional:       General: She is not in acute distress.     Appearance: She is obese. She is not ill-appearing, toxic-appearing or diaphoretic.   HENT:      Head: Normocephalic and atraumatic.       Nose: Nose normal.      Mouth/Throat:      Mouth: Mucous membranes are moist.   Eyes:      Extraocular Movements: Extraocular movements intact.   Cardiovascular:      Rate and Rhythm: Normal rate and regular rhythm.      Pulses: Decreased pulses (in the RLE. Unable to get dopplered pulses).      Heart sounds: Normal heart sounds. No murmur heard.  Pulmonary:      Effort: Pulmonary effort is normal. No respiratory distress.      Breath sounds: Normal breath sounds. No wheezing.   Abdominal:      General: Bowel sounds are normal.      Palpations: Abdomen is soft. There is no mass.      Tenderness: There is no abdominal tenderness. There is no guarding.      Hernia: No hernia is present.   Musculoskeletal:         General: No swelling, tenderness or deformity. Normal range of motion.      Cervical back: Normal range of motion. No rigidity or tenderness.   Skin:     General: Skin is warm and dry.      Coloration: Skin is not jaundiced.      Findings: No bruising or erythema.   Neurological:      General: No focal deficit present.      Mental Status: She is alert and oriented to person, place, and time. Mental status is at baseline.   Psychiatric:         Mood and Affect: Mood normal.         Behavior: Behavior normal.                Significant Labs: All pertinent labs within the past 24 hours have been reviewed.  CBC:   Recent Labs   Lab 09/05/23  1507   WBC 6.52   HGB 12.8   HCT 39.3        CMP:   Recent Labs   Lab 09/05/23  1507      K 4.4      CO2 22*   GLU 49*   BUN 21   CREATININE 1.6*   CALCIUM 9.2   PROT 6.6   ALBUMIN 3.6   BILITOT 0.7   ALKPHOS 51*   AST 24   ALT 15   ANIONGAP 9     Lactic Acid:   Recent Labs   Lab 09/05/23  1507   LACTATE 2.1*         Significant Imaging: I have reviewed all pertinent imaging results/findings within the past 24 hours.

## 2023-09-05 NOTE — ASSESSMENT & PLAN NOTE
Continue home Amlodipine, Lisinopril, and Metoprolol tartate  Continue to monitor patient vitals

## 2023-09-06 ENCOUNTER — ANESTHESIA (OUTPATIENT)
Dept: SURGERY | Facility: HOSPITAL | Age: 83
DRG: 254 | End: 2023-09-06
Payer: MEDICARE

## 2023-09-06 ENCOUNTER — ANESTHESIA EVENT (OUTPATIENT)
Dept: SURGERY | Facility: HOSPITAL | Age: 83
DRG: 254 | End: 2023-09-06
Payer: MEDICARE

## 2023-09-06 ENCOUNTER — CLINICAL SUPPORT (OUTPATIENT)
Dept: SMOKING CESSATION | Facility: CLINIC | Age: 83
End: 2023-09-06
Payer: COMMERCIAL

## 2023-09-06 DIAGNOSIS — F17.200 NICOTINE DEPENDENCE: Primary | ICD-10-CM

## 2023-09-06 LAB
ABO + RH BLD: NORMAL
ALBUMIN SERPL BCP-MCNC: 3.4 G/DL (ref 3.5–5.2)
ALP SERPL-CCNC: 48 U/L (ref 55–135)
ALT SERPL W/O P-5'-P-CCNC: 14 U/L (ref 10–44)
ANION GAP SERPL CALC-SCNC: 5 MMOL/L (ref 8–16)
AST SERPL-CCNC: 23 U/L (ref 10–40)
BASOPHILS # BLD AUTO: 0.03 K/UL (ref 0–0.2)
BASOPHILS # BLD AUTO: 0.05 K/UL (ref 0–0.2)
BASOPHILS NFR BLD: 0.4 % (ref 0–1.9)
BASOPHILS NFR BLD: 0.6 % (ref 0–1.9)
BILIRUB SERPL-MCNC: 0.7 MG/DL (ref 0.1–1)
BLD GP AB SCN CELLS X3 SERPL QL: NORMAL
BUN SERPL-MCNC: 20 MG/DL (ref 8–23)
CALCIUM SERPL-MCNC: 9.2 MG/DL (ref 8.7–10.5)
CHLORIDE SERPL-SCNC: 110 MMOL/L (ref 95–110)
CO2 SERPL-SCNC: 26 MMOL/L (ref 23–29)
CREAT SERPL-MCNC: 1.8 MG/DL (ref 0.5–1.4)
DIFFERENTIAL METHOD: ABNORMAL
DIFFERENTIAL METHOD: ABNORMAL
EOSINOPHIL # BLD AUTO: 0.2 K/UL (ref 0–0.5)
EOSINOPHIL # BLD AUTO: 0.2 K/UL (ref 0–0.5)
EOSINOPHIL NFR BLD: 1.6 % (ref 0–8)
EOSINOPHIL NFR BLD: 3.6 % (ref 0–8)
ERYTHROCYTE [DISTWIDTH] IN BLOOD BY AUTOMATED COUNT: 13.3 % (ref 11.5–14.5)
ERYTHROCYTE [DISTWIDTH] IN BLOOD BY AUTOMATED COUNT: 13.3 % (ref 11.5–14.5)
EST. GFR  (NO RACE VARIABLE): 28 ML/MIN/1.73 M^2
ESTIMATED AVG GLUCOSE: 134 MG/DL (ref 68–131)
GLUCOSE SERPL-MCNC: 148 MG/DL (ref 70–110)
GLUCOSE SERPL-MCNC: 165 MG/DL (ref 70–110)
HBA1C MFR BLD: 6.3 % (ref 4.5–6.2)
HCT VFR BLD AUTO: 37.3 % (ref 37–48.5)
HCT VFR BLD AUTO: 38.3 % (ref 37–48.5)
HGB BLD-MCNC: 12.3 G/DL (ref 12–16)
HGB BLD-MCNC: 12.7 G/DL (ref 12–16)
IMM GRANULOCYTES # BLD AUTO: 0.02 K/UL (ref 0–0.04)
IMM GRANULOCYTES # BLD AUTO: 0.05 K/UL (ref 0–0.04)
IMM GRANULOCYTES NFR BLD AUTO: 0.4 % (ref 0–0.5)
IMM GRANULOCYTES NFR BLD AUTO: 0.4 % (ref 0–0.5)
LYMPHOCYTES # BLD AUTO: 1.4 K/UL (ref 1–4.8)
LYMPHOCYTES # BLD AUTO: 1.7 K/UL (ref 1–4.8)
LYMPHOCYTES NFR BLD: 14.9 % (ref 18–48)
LYMPHOCYTES NFR BLD: 27 % (ref 18–48)
MCH RBC QN AUTO: 33 PG (ref 27–31)
MCH RBC QN AUTO: 33.5 PG (ref 27–31)
MCHC RBC AUTO-ENTMCNC: 33 G/DL (ref 32–36)
MCHC RBC AUTO-ENTMCNC: 33.2 G/DL (ref 32–36)
MCV RBC AUTO: 100 FL (ref 82–98)
MCV RBC AUTO: 101 FL (ref 82–98)
MONOCYTES # BLD AUTO: 0.5 K/UL (ref 0.3–1)
MONOCYTES # BLD AUTO: 1 K/UL (ref 0.3–1)
MONOCYTES NFR BLD: 8.5 % (ref 4–15)
MONOCYTES NFR BLD: 9.2 % (ref 4–15)
NEUTROPHILS # BLD AUTO: 3.2 K/UL (ref 1.8–7.7)
NEUTROPHILS # BLD AUTO: 8.5 K/UL (ref 1.8–7.7)
NEUTROPHILS NFR BLD: 59.2 % (ref 38–73)
NEUTROPHILS NFR BLD: 74.2 % (ref 38–73)
NRBC BLD-RTO: 0 /100 WBC
NRBC BLD-RTO: 0 /100 WBC
PLATELET # BLD AUTO: 211 K/UL (ref 150–450)
PLATELET # BLD AUTO: 213 K/UL (ref 150–450)
PMV BLD AUTO: 9.1 FL (ref 9.2–12.9)
PMV BLD AUTO: 9.2 FL (ref 9.2–12.9)
POC ACTIVATED CLOTTING TIME K: 185 SEC (ref 74–137)
POTASSIUM SERPL-SCNC: 3.8 MMOL/L (ref 3.5–5.1)
PROT SERPL-MCNC: 6.4 G/DL (ref 6–8.4)
RBC # BLD AUTO: 3.73 M/UL (ref 4–5.4)
RBC # BLD AUTO: 3.79 M/UL (ref 4–5.4)
SAMPLE: ABNORMAL
SODIUM SERPL-SCNC: 141 MMOL/L (ref 136–145)
SPECIMEN OUTDATE: NORMAL
WBC # BLD AUTO: 11.41 K/UL (ref 3.9–12.7)
WBC # BLD AUTO: 5.34 K/UL (ref 3.9–12.7)

## 2023-09-06 PROCEDURE — 37000008 HC ANESTHESIA 1ST 15 MINUTES: Performed by: SURGERY

## 2023-09-06 PROCEDURE — C1887 CATHETER, GUIDING: HCPCS | Performed by: SURGERY

## 2023-09-06 PROCEDURE — 63600175 PHARM REV CODE 636 W HCPCS: Performed by: NURSE ANESTHETIST, CERTIFIED REGISTERED

## 2023-09-06 PROCEDURE — D9220A PRA ANESTHESIA: Mod: CRNA,,, | Performed by: NURSE ANESTHETIST, CERTIFIED REGISTERED

## 2023-09-06 PROCEDURE — C1769 GUIDE WIRE: HCPCS | Performed by: SURGERY

## 2023-09-06 PROCEDURE — 27201423 OPTIME MED/SURG SUP & DEVICES STERILE SUPPLY: Performed by: SURGERY

## 2023-09-06 PROCEDURE — 25000003 PHARM REV CODE 250

## 2023-09-06 PROCEDURE — 85025 COMPLETE CBC W/AUTO DIFF WBC: CPT | Mod: 91 | Performed by: SURGERY

## 2023-09-06 PROCEDURE — 86850 RBC ANTIBODY SCREEN: CPT | Performed by: SURGERY

## 2023-09-06 PROCEDURE — 99900035 HC TECH TIME PER 15 MIN (STAT)

## 2023-09-06 PROCEDURE — 25000003 PHARM REV CODE 250: Performed by: NURSE ANESTHETIST, CERTIFIED REGISTERED

## 2023-09-06 PROCEDURE — 88304 TISSUE EXAM BY PATHOLOGIST: CPT | Mod: TC | Performed by: PATHOLOGY

## 2023-09-06 PROCEDURE — 99152 MOD SED SAME PHYS/QHP 5/>YRS: CPT | Performed by: SURGERY

## 2023-09-06 PROCEDURE — 25000003 PHARM REV CODE 250: Performed by: INTERNAL MEDICINE

## 2023-09-06 PROCEDURE — 36000706: Performed by: SURGERY

## 2023-09-06 PROCEDURE — 20000000 HC ICU ROOM

## 2023-09-06 PROCEDURE — 75625 CONTRAST EXAM ABDOMINL AORTA: CPT | Performed by: SURGERY

## 2023-09-06 PROCEDURE — D9220A PRA ANESTHESIA: Mod: ANES,,, | Performed by: STUDENT IN AN ORGANIZED HEALTH CARE EDUCATION/TRAINING PROGRAM

## 2023-09-06 PROCEDURE — S4991 NICOTINE PATCH NONLEGEND: HCPCS

## 2023-09-06 PROCEDURE — 37000009 HC ANESTHESIA EA ADD 15 MINS: Performed by: SURGERY

## 2023-09-06 PROCEDURE — 85025 COMPLETE CBC W/AUTO DIFF WBC: CPT

## 2023-09-06 PROCEDURE — C1725 CATH, TRANSLUMIN NON-LASER: HCPCS | Performed by: SURGERY

## 2023-09-06 PROCEDURE — 36000707: Performed by: SURGERY

## 2023-09-06 PROCEDURE — 36415 COLL VENOUS BLD VENIPUNCTURE: CPT

## 2023-09-06 PROCEDURE — C1894 INTRO/SHEATH, NON-LASER: HCPCS | Performed by: SURGERY

## 2023-09-06 PROCEDURE — 99407 PR TOBACCO USE CESSATION INTENSIVE >10 MINUTES: ICD-10-PCS | Mod: S$GLB,,, | Performed by: INTERNAL MEDICINE

## 2023-09-06 PROCEDURE — D9220A PRA ANESTHESIA: ICD-10-PCS | Mod: CRNA,,, | Performed by: NURSE ANESTHETIST, CERTIFIED REGISTERED

## 2023-09-06 PROCEDURE — 63600175 PHARM REV CODE 636 W HCPCS: Performed by: SURGERY

## 2023-09-06 PROCEDURE — 94799 UNLISTED PULMONARY SVC/PX: CPT

## 2023-09-06 PROCEDURE — 94761 N-INVAS EAR/PLS OXIMETRY MLT: CPT

## 2023-09-06 PROCEDURE — D9220A PRA ANESTHESIA: ICD-10-PCS | Mod: ANES,,, | Performed by: STUDENT IN AN ORGANIZED HEALTH CARE EDUCATION/TRAINING PROGRAM

## 2023-09-06 PROCEDURE — 80053 COMPREHEN METABOLIC PANEL: CPT

## 2023-09-06 PROCEDURE — 37224 HC FEM/POPL REVAS W/TLA: CPT | Mod: RT | Performed by: SURGERY

## 2023-09-06 PROCEDURE — 99407 BEHAV CHNG SMOKING > 10 MIN: CPT | Mod: S$GLB,,, | Performed by: INTERNAL MEDICINE

## 2023-09-06 PROCEDURE — 36415 COLL VENOUS BLD VENIPUNCTURE: CPT | Performed by: SURGERY

## 2023-09-06 PROCEDURE — 25000003 PHARM REV CODE 250: Performed by: SURGERY

## 2023-09-06 PROCEDURE — C1757 CATH, THROMBECTOMY/EMBOLECT: HCPCS | Performed by: SURGERY

## 2023-09-06 RX ORDER — HEPARIN SODIUM 1000 [USP'U]/ML
INJECTION, SOLUTION INTRAVENOUS; SUBCUTANEOUS
Status: DISCONTINUED | OUTPATIENT
Start: 2023-09-06 | End: 2023-09-06 | Stop reason: HOSPADM

## 2023-09-06 RX ORDER — ROCURONIUM BROMIDE 10 MG/ML
INJECTION, SOLUTION INTRAVENOUS
Status: DISCONTINUED | OUTPATIENT
Start: 2023-09-06 | End: 2023-09-06

## 2023-09-06 RX ORDER — ACETAMINOPHEN 10 MG/ML
INJECTION, SOLUTION INTRAVENOUS
Status: DISCONTINUED | OUTPATIENT
Start: 2023-09-06 | End: 2023-09-06

## 2023-09-06 RX ORDER — PROPOFOL 10 MG/ML
VIAL (ML) INTRAVENOUS
Status: DISCONTINUED | OUTPATIENT
Start: 2023-09-06 | End: 2023-09-06

## 2023-09-06 RX ORDER — FENTANYL CITRATE 50 UG/ML
INJECTION, SOLUTION INTRAMUSCULAR; INTRAVENOUS
Status: DISCONTINUED | OUTPATIENT
Start: 2023-09-06 | End: 2023-09-06 | Stop reason: HOSPADM

## 2023-09-06 RX ORDER — PROTAMINE SULFATE 10 MG/ML
INJECTION, SOLUTION INTRAVENOUS
Status: DISCONTINUED | OUTPATIENT
Start: 2023-09-06 | End: 2023-09-06

## 2023-09-06 RX ORDER — SODIUM CHLORIDE 9 MG/ML
INJECTION, SOLUTION INTRAVENOUS CONTINUOUS
Status: DISCONTINUED | OUTPATIENT
Start: 2023-09-06 | End: 2023-09-07 | Stop reason: HOSPADM

## 2023-09-06 RX ORDER — ONDANSETRON 2 MG/ML
INJECTION INTRAMUSCULAR; INTRAVENOUS
Status: DISCONTINUED | OUTPATIENT
Start: 2023-09-06 | End: 2023-09-06

## 2023-09-06 RX ORDER — PHENYLEPHRINE HYDROCHLORIDE 10 MG/ML
INJECTION INTRAVENOUS
Status: DISCONTINUED | OUTPATIENT
Start: 2023-09-06 | End: 2023-09-06

## 2023-09-06 RX ORDER — HYDROMORPHONE HYDROCHLORIDE 1 MG/ML
1 INJECTION, SOLUTION INTRAMUSCULAR; INTRAVENOUS; SUBCUTANEOUS EVERY 4 HOURS PRN
Status: DISCONTINUED | OUTPATIENT
Start: 2023-09-06 | End: 2023-09-07 | Stop reason: HOSPADM

## 2023-09-06 RX ORDER — MUPIROCIN 20 MG/G
OINTMENT TOPICAL 2 TIMES DAILY
Status: DISCONTINUED | OUTPATIENT
Start: 2023-09-06 | End: 2023-09-07 | Stop reason: HOSPADM

## 2023-09-06 RX ORDER — FAMOTIDINE 10 MG/ML
INJECTION INTRAVENOUS
Status: DISCONTINUED | OUTPATIENT
Start: 2023-09-06 | End: 2023-09-06

## 2023-09-06 RX ORDER — LIDOCAINE HYDROCHLORIDE 20 MG/ML
INJECTION, SOLUTION EPIDURAL; INFILTRATION; INTRACAUDAL; PERINEURAL
Status: DISCONTINUED | OUTPATIENT
Start: 2023-09-06 | End: 2023-09-06

## 2023-09-06 RX ORDER — HEPARIN SODIUM 10000 [USP'U]/ML
INJECTION, SOLUTION INTRAVENOUS; SUBCUTANEOUS
Status: DISCONTINUED | OUTPATIENT
Start: 2023-09-06 | End: 2023-09-06

## 2023-09-06 RX ORDER — FENTANYL CITRATE 50 UG/ML
INJECTION, SOLUTION INTRAMUSCULAR; INTRAVENOUS
Status: DISCONTINUED | OUTPATIENT
Start: 2023-09-06 | End: 2023-09-06

## 2023-09-06 RX ORDER — SUCCINYLCHOLINE CHLORIDE 20 MG/ML
INJECTION INTRAMUSCULAR; INTRAVENOUS
Status: DISCONTINUED | OUTPATIENT
Start: 2023-09-06 | End: 2023-09-06

## 2023-09-06 RX ORDER — CEFAZOLIN SODIUM 1 G/3ML
INJECTION, POWDER, FOR SOLUTION INTRAMUSCULAR; INTRAVENOUS
Status: DISCONTINUED | OUTPATIENT
Start: 2023-09-06 | End: 2023-09-06 | Stop reason: HOSPADM

## 2023-09-06 RX ORDER — CEFAZOLIN SODIUM 1 G/3ML
INJECTION, POWDER, FOR SOLUTION INTRAMUSCULAR; INTRAVENOUS
Status: DISCONTINUED | OUTPATIENT
Start: 2023-09-06 | End: 2023-09-06

## 2023-09-06 RX ORDER — HEPARIN SODIUM 5000 [USP'U]/ML
INJECTION, SOLUTION INTRAVENOUS; SUBCUTANEOUS
Status: DISCONTINUED | OUTPATIENT
Start: 2023-09-06 | End: 2023-09-06 | Stop reason: HOSPADM

## 2023-09-06 RX ORDER — MIDAZOLAM HYDROCHLORIDE 1 MG/ML
INJECTION INTRAMUSCULAR; INTRAVENOUS
Status: DISCONTINUED | OUTPATIENT
Start: 2023-09-06 | End: 2023-09-06 | Stop reason: HOSPADM

## 2023-09-06 RX ADMIN — ROCURONIUM BROMIDE 10 MG: 10 INJECTION, SOLUTION INTRAVENOUS at 05:09

## 2023-09-06 RX ADMIN — FAMOTIDINE 20 MG: 10 INJECTION, SOLUTION INTRAVENOUS at 05:09

## 2023-09-06 RX ADMIN — FENTANYL CITRATE 50 MCG: 50 INJECTION INTRAMUSCULAR; INTRAVENOUS at 05:09

## 2023-09-06 RX ADMIN — PROPOFOL 100 MG: 10 INJECTION, EMULSION INTRAVENOUS at 06:09

## 2023-09-06 RX ADMIN — PROTAMINE SULFATE 100 MG: 10 INJECTION, SOLUTION INTRAVENOUS at 06:09

## 2023-09-06 RX ADMIN — CILOSTAZOL 100 MG: 100 TABLET ORAL at 08:09

## 2023-09-06 RX ADMIN — LIDOCAINE HYDROCHLORIDE 100 MG: 20 INJECTION, SOLUTION INTRAVENOUS at 05:09

## 2023-09-06 RX ADMIN — PHENYLEPHRINE HYDROCHLORIDE 200 MCG: 10 INJECTION INTRAVENOUS at 06:09

## 2023-09-06 RX ADMIN — CEFAZOLIN 2 G: 330 INJECTION, POWDER, FOR SOLUTION INTRAMUSCULAR; INTRAVENOUS at 05:09

## 2023-09-06 RX ADMIN — ACETAMINOPHEN 500 MG: 10 INJECTION, SOLUTION INTRAVENOUS at 05:09

## 2023-09-06 RX ADMIN — PHENYLEPHRINE HYDROCHLORIDE 200 MCG: 10 INJECTION INTRAVENOUS at 05:09

## 2023-09-06 RX ADMIN — NICOTINE 1 PATCH: 14 PATCH, EXTENDED RELEASE TRANSDERMAL at 08:09

## 2023-09-06 RX ADMIN — GABAPENTIN 300 MG: 300 CAPSULE ORAL at 08:09

## 2023-09-06 RX ADMIN — HYDROCODONE BITARTRATE AND ACETAMINOPHEN 1 TABLET: 5; 325 TABLET ORAL at 07:09

## 2023-09-06 RX ADMIN — HEPARIN SODIUM 10000 UNITS: 10000 INJECTION, SOLUTION INTRAVENOUS; SUBCUTANEOUS at 05:09

## 2023-09-06 RX ADMIN — PROPOFOL 100 MG: 10 INJECTION, EMULSION INTRAVENOUS at 05:09

## 2023-09-06 RX ADMIN — SODIUM CHLORIDE: 0.9 INJECTION, SOLUTION INTRAVENOUS at 07:09

## 2023-09-06 RX ADMIN — SODIUM CHLORIDE: 9 INJECTION, SOLUTION INTRAVENOUS at 05:09

## 2023-09-06 RX ADMIN — METOPROLOL TARTRATE 50 MG: 50 TABLET, FILM COATED ORAL at 08:09

## 2023-09-06 RX ADMIN — SUGAMMADEX 200 MG: 100 INJECTION, SOLUTION INTRAVENOUS at 06:09

## 2023-09-06 RX ADMIN — Medication 120 MG: at 05:09

## 2023-09-06 RX ADMIN — ONDANSETRON 4 MG: 2 INJECTION INTRAMUSCULAR; INTRAVENOUS at 05:09

## 2023-09-06 RX ADMIN — MUPIROCIN 1 G: 20 OINTMENT TOPICAL at 08:09

## 2023-09-06 NOTE — TRANSFER OF CARE
"Anesthesia Transfer of Care Note    Patient: Mary Cynthia Cryer    Procedure(s) Performed: Procedure(s) (LRB):  EMBOLECTOMY, LOWER EXTREMITY (Right)  ANGIOGRAM, EXTREMITY, UNILATERAL (Right)    Patient location: ICU    Anesthesia Type: general    Transport from OR: Transported from OR on room air with adequate spontaneous ventilation. Continuous ECG monitoring in transport. Continuous SpO2 monitoring in transport. Continuos invasive BP monitoring in transport    Post pain: adequate analgesia    Post assessment: no apparent anesthetic complications and tolerated procedure well    Post vital signs: stable    Level of consciousness: responds to stimulation and awake    Nausea/Vomiting: no nausea/vomiting    Complications: none    Transfer of care protocol was followed      Last vitals:   Visit Vitals  BP (!) 183/77   Pulse 83   Temp 36.3 °C (97.4 °F) (Oral)   Resp 12   Ht 5' 8" (1.727 m)   Wt 113.2 kg (249 lb 8 oz)   SpO2 (!) 94%   BMI 37.94 kg/m²     "

## 2023-09-06 NOTE — ED NOTES
Notified Dr. Broussard in messenger about pt BG 55. Feeding pt a meal tray from the cafeteria. Will recheck BG after pt has eaten. Pt  asymptomatic for hyperglycemia. AAOx4, skin warm and dry. NAND, Resp unlabored, even, VSS WNL.

## 2023-09-06 NOTE — ANESTHESIA PROCEDURE NOTES
Intubation    Date/Time: 9/6/2023 5:06 PM    Performed by: Pepe Rivero CRNA  Authorized by: Pepe Rivero CRNA    Intubation:     Induction:  Intravenous    Intubated:  Postinduction    Mask Ventilation:  Easy with oral airway    Attempts:  1    Attempted By:  CRNA    Method of Intubation:  Direct    Blade:  Cazares 3    Laryngeal View Grade: Grade I - full view of cords      Difficult Airway Encountered?: No      Complications:  None    Airway Device:  Oral endotracheal tube    Airway Device Size:  7.0    Style/Cuff Inflation:  Cuffed    Inflation Amount (mL):  8    Tube secured:  22    Secured at:  The lips    Placement Verified By:  Capnometry and other (see comments) (direct visualization)    Complicating Factors:  None    Findings Post-Intubation:  BS equal bilateral and atraumatic/condition of teeth unchanged  Notes:      Smooth induction, atraumatic intubation, dentition/lips intact and unchanged, zero complications

## 2023-09-06 NOTE — CONSULTS
83-year-old female well known to me from previous procedures and interventions.  About 2 years ago she had a right femoral endarterectomy for severe atherosclerotic occlusive disease and then last year had a right SFA long segment CSI atherectomy and angioplasty.  In late August she underwent carotid angiography through a right femoral access with Angio-Seal closure at [facility].  About 3 days ago she started with severe right foot pain and came to the ER last night.  Arterial duplex shows minimal flow distal to the common femoral artery.    Physical exam she is awake alert and oriented.  She has motor function of the right foot and it is a little bit cooler than the left but is viable.  Her pain is minimal at rest but developed severe pain with ambulation.    Will plan for angiography today.

## 2023-09-06 NOTE — PROGRESS NOTES
Inpatient Smoking Cessation:    Pt has a 65 year smoking history. Pt doesn't know at this time how much she smokes, it varies day to day. Currently has a patch on. [employer] member: [id number]

## 2023-09-06 NOTE — PROGRESS NOTES
[facility] Medicine  Progress Note    Patient Name: Mary Cynthia Cryer  MRN: [id number]  Patient Class: IP- Inpatient   Admission Date: 9/5/2023  Length of Stay: 1 days  Attending Physician: Ha Martinez MD  Primary Care Provider: Emmy Horton MD        Subjective:     Principal Problem:PVD (peripheral vascular disease)        HPI:  No notes on file    Overview/Hospital Course:  September 6, 2023  Right lower extremity numbness and tingling.  Patient reports somewhat improved.  Patient denied chest pain, shortness of breath, nausea, vomiting, fever, chills      Interval History:   Objective:     Vital Signs (Most Recent):  Temp: 97.8 °F (36.6 °C) (09/06/23 0739)  Pulse: 89 (09/06/23 0739)  Resp: 18 (09/06/23 0739)  BP: 136/73 (09/06/23 0739)  SpO2: 96 % (09/06/23 0754) Vital Signs (24h Range):  Temp:  [97.7 °F (36.5 °C)-98 °F (36.7 °C)] 97.8 °F (36.6 °C)  Pulse:  [78-93] 89  Resp:  [16-20] 18  SpO2:  [92 %-97 %] 96 %  BP: (122-166)/(62-78) 136/73     Weight: 113.2 kg (249 lb 8 oz)  Body mass index is 37.94 kg/m².    Intake/Output Summary (Last 24 hours) at 9/6/2023 1020  Last data filed at 9/6/2023 0526  Gross per 24 hour   Intake 0 ml   Output 600 ml   Net -600 ml             Physical Exam  Vitals and nursing note reviewed.   Constitutional:       General: She is not in acute distress.     Appearance: She is obese. She is not ill-appearing, toxic-appearing or diaphoretic.   HENT:      Head: Normocephalic and atraumatic.      Nose: Nose normal.      Mouth/Throat:      Mouth: Mucous membranes are moist.   Eyes:      Extraocular Movements: Extraocular movements intact.   Cardiovascular:      Rate and Rhythm: Normal rate and regular rhythm.      Pulses: Decreased pulses (in the RLE. Unable to get dopplered pulses).      Heart sounds: Normal heart sounds. No murmur heard.  Pulmonary:      Effort: Pulmonary effort is normal. No respiratory distress.      Breath sounds: Normal breath  sounds. No wheezing.   Abdominal:      General: Bowel sounds are normal.      Palpations: Abdomen is soft. There is no mass.      Tenderness: There is no abdominal tenderness. There is no guarding.      Hernia: No hernia is present.   Musculoskeletal:         General: No swelling, tenderness or deformity. Normal range of motion.      Cervical back: Normal range of motion. No rigidity or tenderness.   Skin:     General: Skin is warm and dry.      Coloration: Skin is not jaundiced.      Findings: No bruising or erythema.   Neurological:      General: No focal deficit present.      Mental Status: She is alert and oriented to person, place, and time. Mental status is at baseline.   Psychiatric:      Significant Labs: All pertinent labs within the past 24 hours have been reviewed.  CBC:   Recent Labs   Lab 09/05/23  1507 09/06/23  0535   WBC 6.52 5.34   HGB 12.8 12.3   HCT 39.3 37.3    211     CMP:   Recent Labs   Lab 09/05/23  1507 09/06/23  0535    141   K 4.4 3.8    110   CO2 22* 26   GLU 49* 148*   BUN 21 20   CREATININE 1.6* 1.8*   CALCIUM 9.2 9.2   PROT 6.6 6.4   ALBUMIN 3.6 3.4*   BILITOT 0.7 0.7   ALKPHOS 51* 48*   AST 24 23   ALT 15 14   ANIONGAP 9 5*       Significant Imaging: I have reviewed all pertinent imaging results/findings within the past 24 hours.  Sodium      Assessment/Plan:      * PVD (peripheral vascular disease)  Patient has a history of PVD and poor circulation in her bilateral lower extremities.   US of LE arteries done 8/24/2023 showed:    Right sided-common femoral artery remains patent. There is no evidence of pseudoaneurysm or significant groin hematoma. There is femoral popliteal and infrapopliteal chronic occlusive disease. There is chronic occlusion of the anterior tibial artery.    Left sided- There is chronic occlusion of the anterior tibial artery.   Bilateral LE US Today showed: monophasic flow bilaterally left greater than right. Unable to get a dorsalis pedis  pulse on the right side    Pending Consult to vascular surgery was placed- ED talked to Dr. Armas who said that they would see her in the morning  NPO tonight.  Monitor blood sugar  Continue home Cilostazol              Diabetes mellitus type 2 in obese  Patient had a blood glucose in the 40s on arrival to the ED and was given D5 normal which increased her blood sugar to the 200s  Patient returned to a blood glucose of 87   Diet NPO for vascular surgery in the morning  Continue to monitor blood sugar with POC Glucose   Sliding scale insulin ordered    Tobacco abuse  Nicotine patches ordered       Hypertension  Continue home Amlodipine, Lisinopril, and Metoprolol tartate  Continue to monitor patient vitals       Hypothyroidism  Continue home Levothyroxine        VTE Risk Mitigation (From admission, onward)         Ordered     enoxaparin injection 40 mg  Daily         09/05/23 1825     IP VTE HIGH RISK PATIENT  Once         09/05/23 1825     Place sequential compression device  Until discontinued         09/05/23 1825                Discharge Planning   NITZA: 9/7/2023     Code Status: DNR   Is the patient medically ready for discharge?:     Reason for patient still in hospital (select all that apply): Treatment                     Ha Martinez MD  Department of Hospital Medicine   [facility]

## 2023-09-06 NOTE — HOSPITAL COURSE
Presented with right lower extremity numbness and tingling.  She has history of right femoral endarterectomy for severe atherosclerotic occlusive disease and right SFA atherectomy and angioplasty 1 year prior.  She underwent carotid angiography through right femoral access shortly prior to admission.  She began to have severe right foot pain about a week and a half later.  Arterial duplex showed minimal flow to the distal common femoral artery and she was evaluated by vascular surgery and underwent right femoral thrombectomy on 09/06.  She tolerated procedure well and was discharged home in stable condition with close vascular surgery follow up.

## 2023-09-06 NOTE — INTERVAL H&P NOTE
The patient has been examined and the H&P has been reviewed:    I concur with the findings and no changes have occurred since H&P was written.    Surgery risks, benefits and alternative options discussed and understood by patient/family.          Active Hospital Problems    Diagnosis  POA    *PVD (peripheral vascular disease) [I73.9]  Yes    Diabetes mellitus type 2 in obese [E11.69, E66.9]  Yes     Chronic    Hypertension [I10]  Yes     Chronic    Tobacco abuse [Z72.0]  Yes     Chronic    Hypothyroidism [E03.9]  Yes     Chronic      Resolved Hospital Problems   No resolved problems to display.

## 2023-09-06 NOTE — PLAN OF CARE
[facility]  Initial Discharge Assessment       Primary Care Provider: Emmy Horton MD    Admission Diagnosis: Chest pain [R07.9]    Admission Date: 9/5/2023  Expected Discharge Date: 9/7/2023    Patient assessment done at bedside. Information verified and confirmed per patient. Patient states that her friend Brenda Kent will take her home upon discharge.         Payor: [employer] MANAGED MEDICARE / Plan: [employer] MEDICARE HMO / Product Type: Capitation /     Extended Emergency Contact Information  Primary Emergency Contact: Brenda Kent  Mobile Phone: [phone]  Relation: Friend    Discharge Plan A: Home  Discharge Plan B: Home      [facility] [address]  [address] [address]  [facility] [address]  Phone: [phone] Fax: [phone]      Initial Assessment (most recent)       Adult Discharge Assessment - 09/06/23 1416          Discharge Assessment    Assessment Type Discharge Planning Assessment     Confirmed/corrected address, phone number and insurance Yes     Confirmed Demographics Correct on Facesheet     Source of Information patient     When was your last doctors appointment? 08/08/23     Communicated NITZA with patient/caregiver Date not available/Unable to determine     Reason For Admission Chest Pain     People in Home alone     Do you expect to return to your current living situation? Yes     Do you have help at home or someone to help you manage your care at home? Yes     Who are your caregiver(s) and their phone number(s)? Brenda Kent, fabio [phone]     Prior to hospitilization cognitive status: Alert/Oriented     Current cognitive status: Alert/Oriented     Walking or Climbing Stairs ambulation difficulty, requires equipment;transferring difficulty, requires equipment;stair climbing difficulty, requires equipment     Mobility Management Rollator/ WC     Home Accessibility wheelchair accessible     Home Layout Able to live on 1st floor      Equipment Currently Used at Home wheelchair;rollator     Readmission within 30 days? No     Patient currently being followed by outpatient case management? No     Do you currently have service(s) that help you manage your care at home? No     Do you take prescription medications? Yes     Do you have prescription coverage? Yes     Coverage [employer] Man[employer] Medicare     Do you have any problems affording any of your prescribed medications? No     Is the patient taking medications as prescribed? yes     Who is going to help you get home at discharge? Brenda Kent, Friend     How do you get to doctors appointments? family or friend will provide     Are you on dialysis? No     Do you take coumadin? No     Discharge Plan discussed with: Patient     Discharge Plan A Home     Discharge Plan B Home

## 2023-09-06 NOTE — CARE UPDATE
09/06/23 0754   Patient Assessment/Suction   Level of Consciousness (AVPU) alert   Respiratory Effort Normal;Unlabored   Expansion/Accessory Muscles/Retractions expansion symmetric;no retractions   Rhythm/Pattern, Respiratory depth regular;pattern regular;unlabored   Cough Frequency infrequent   PRE-TX-O2   Device (Oxygen Therapy) room air   SpO2 96 %   Pulse Oximetry Type Intermittent   $ Pulse Oximetry - Multiple Charge Pulse Oximetry - Multiple   Aerosol Therapy   $ Aerosol Therapy Charges PRN treatment not required

## 2023-09-06 NOTE — NURSING
Received patient from ED via stretcher in no apparent distress. On room air, respirations even and unlabored. Denies pain. Assessment and admission completed. Skin assessed. See 4eyes documentation. BS 94    Nurses Note -- 4 Eyes      9/5/2023   9:17 PM      Skin assessed during: Admit      [x] No Altered Skin Integrity Present    []Prevention Measures Documented      [] Yes- Altered Skin Integrity Present or Discovered   [] LDA Added if Not in Epic (Describe Wound)   [] New Altered Skin Integrity was Present on Admit and Documented in LDA   [] Wound Image Taken    Wound Care Consulted? No    Attending Nurse:  Darshana    Second RN/Staff Member:   [email or web address]    5936 Nicotine patch applied to right upper arm    0124 Patient lying in bed resting comfortably with eyes closed in no apparent distress. Respirations even and unlabored. No needs at this time. Call light within reach. Bed in lowest position and locked.     0331 Patient lying in bed resting comfortably with eyes closed in no apparent distress. Respirations even and unlabored.  No needs at this time. Call light within reach. Bed in lowest position and locked.     0511 Patient lying in bed resting comfortably with eyes closed in no apparent distress. Respirations even and unlabored.  No needs at this time. Call light within reach. Bed in lowest position and locked.     0630 Patient resting comfortably with eyes closed in no apparent distress. Respirations even and unlabored. Safety and hygiene maintained throughout the shift. NPO. VS have remained stable. Cardiac monitoring - NSR. Call light within reach. Bed in lowest position and wheels locked.

## 2023-09-06 NOTE — ANESTHESIA PREPROCEDURE EVALUATION
09/06/2023  Mary Cynthia Cryer is a 83 y.o., female.      Patient Active Problem List   Diagnosis    Morbid obesity    PVD (peripheral vascular disease)    Uncontrolled type 2 diabetes mellitus with circulatory disorder, with long-term current use of insulin    Hypothyroidism    Displaced fracture of medial malleolus of right tibia    Hypertension    ACP (advance care planning)    Tobacco abuse    Surgical wound, non healing    Diabetes mellitus type 2 in obese    Acute renal failure superimposed on stage 3 chronic kidney disease    Chest pain    COPD (chronic obstructive pulmonary disease)       Past Surgical History:   Procedure Laterality Date    ANGIOGRAPHY OF LOWER EXTREMITY N/A 12/11/2019    Procedure: Angiogram Extremity Unilateral;  Surgeon: Cruz Amras MD;  Location: [facility] CATH/EP LAB;  Service: Cardiology;  Laterality: N/A;    ANKLE HARDWARE REMOVAL Right 12/15/2022    Procedure: REMOVAL, HARDWARE, ANKLE;  Surgeon: Nae Jerry MD;  Location: [facility] OR;  Service: Orthopedics;  Laterality: Right;    CORONARY ARTERY BYPASS GRAFT      HYSTERECTOMY      partial    IRRIGATION AND DEBRIDEMENT OF LOWER EXTREMITY Right 12/15/2022    Procedure: IRRIGATION AND DEBRIDEMENT, LOWER EXTREMITY;  Surgeon: Nae Jerry MD;  Location: [facility] OR;  Service: Orthopedics;  Laterality: Right;    OPEN REDUCTION AND INTERNAL FIXATION (ORIF) OF INJURY OF ANKLE Right 7/20/2022    Procedure: ORIF, ANKLE- medial mallelous;  Surgeon: Nae Jerry MD;  Location: [facility] OR;  Service: Orthopedics;  Laterality: Right;    PERIPHERAL ANGIOGRAPHY  12/11/2019        Tobacco Use:  The patient  reports that she has been smoking cigarettes. She has a 32.5 pack-year smoking history. She has never used smokeless tobacco.     Results for orders placed or performed during the hospital encounter of 09/05/23   EKG  12-lead    Collection Time: 09/05/23  2:30 PM    Narrative    Test Reason : R07.9,    Vent. Rate : 076 BPM     Atrial Rate : 076 BPM     P-R Int : 208 ms          QRS Dur : 132 ms      QT Int : 430 ms       P-R-T Axes : 056 -11 058 degrees     QTc Int : 483 ms    Sinus rhythm with occasional Premature ventricular complexes  Right bundle branch block  Abnormal ECG  When compared with ECG of 24-JUN-2023 15:02,  Premature ventricular complexes are now Present  The axis Shifted right  Borderline criteria for Lateral infarct are no longer Present    Referred By: [email or web address]   SELF           Confirmed By:         Imaging Results          US Lower Extremity Arteries Bilateral (Final result)  Result time 09/05/23 18:56:17    Final result by Cresencio Lopez MD (09/05/23 18:56:17)                 Narrative:    History: Pain is only clinical history available.    FINDINGS: Bilateral lower extremity arterial duplex exam was performed by the sonographer. Static images are submitted. Atherosclerotic changes are noted throughout bilateral lower extremities.    Right lower extremity evaluation demonstrates a monophasic waveform in the visualized portions of the common femoral, superficial femoral, popliteal, posterior tibial, peroneal, and anterior tibial arteries with progressively decreasing velocities throughout the right lower extremity. No blood flow is noted in the right dorsalis pedis artery.    Left lower extremity evaluation demonstrates biphasic waveform in the left common femoral, and superficial femoral artery. Predominantly monophasic waveform is noted in the left popliteal, peroneal, posterior tibial, and anterior tibial arteries. Monophasic waveform is noted in the left dorsalis pedis artery. No significantly elevated velocities are noted in the left lower extremity.    IMPRESSION:  1. Atherosclerotic changes throughout bilateral lower extremities.  2. Monophasic waveform throughout the right lower extremity with  progressively decreasing velocity suggestive of a more central flow limiting stenosis.  3. Abnormal waveform in the left lower extremity arterial system, however, no evidence of large vessel left lower extremity arterial occlusion or significant stenosis is seen.    Electronically signed by:  Cresencio Lopez MD  9/5/2023 6:56 PM CDT Workstation: [id number]                               Lab Results   Component Value Date    WBC 5.34 09/06/2023    HGB 12.3 09/06/2023    HCT 37.3 09/06/2023     (H) 09/06/2023     09/06/2023     BMP  Lab Results   Component Value Date     09/06/2023    K 3.8 09/06/2023     09/06/2023    CO2 26 09/06/2023    BUN 20 09/06/2023    CREATININE 1.8 (H) 09/06/2023    CALCIUM 9.2 09/06/2023    ANIONGAP 5 (L) 09/06/2023     (H) 09/06/2023    GLU 49 (LL) 09/05/2023     (H) 06/29/2023       Results for orders placed during the hospital encounter of 06/24/23    Echo    Interpretation Summary  · The left ventricle is normal in size with concentric remodeling and normal systolic function.  · The estimated ejection fraction is 55%.  · Limited echo no Doppler            Pre-op Assessment    I have reviewed the Patient Summary Reports.     I have reviewed the Nursing Notes. I have reviewed the NPO Status.   I have reviewed the Medications.     Review of Systems  Anesthesia Hx:  Denies Family Hx of Anesthesia complications.   Denies Personal Hx of Anesthesia complications.   Social:  Smoker    Cardiovascular:   Hypertension CAD asymptomatic CABG/stent  PVD hyperlipidemia ECG has been reviewed.    Pulmonary:   COPD    Renal/:   Chronic Renal Disease, ARF, CKD    Endocrine:   Diabetes, using insulin Hypothyroidism  Obesity / BMI > 30      Physical Exam  General: Well nourished, Cooperative, Alert and Oriented    Airway:  Mallampati: II   Mouth Opening: Normal  TM Distance: Normal  Tongue: Normal  Neck ROM: Normal ROM    Dental:  Intact    Chest/Lungs:  Clear to  auscultation    Heart:  Rate: Normal  Rhythm: Regular Rhythm  Sounds: Normal        Anesthesia Plan  Type of Anesthesia, risks & benefits discussed:    Anesthesia Type: Gen ETT  Intra-op Monitoring Plan: Standard ASA Monitors  Post Op Pain Control Plan: multimodal analgesia  Induction:  IV  Airway Plan: Video and Direct  Informed Consent: Informed consent signed with the Patient and all parties understand the risks and agree with anesthesia plan.  All questions answered.   ASA Score: 3 Emergent  Anesthesia Plan Notes: EMERSON precautions  Ofirmev    Ready For Surgery From Anesthesia Perspective.     .

## 2023-09-06 NOTE — H&P (VIEW-ONLY)
83-year-old female well known to me from previous procedures and interventions.  About 2 years ago she had a right femoral endarterectomy for severe atherosclerotic occlusive disease and then last year had a right SFA long segment CSI atherectomy and angioplasty.  In late August she underwent carotid angiography through a right femoral access with Angio-Seal closure at [facility].  About 3 days ago she started with severe right foot pain and came to the ER last night.  Arterial duplex shows minimal flow distal to the common femoral artery.    Physical exam she is awake alert and oriented.  She has motor function of the right foot and it is a little bit cooler than the left but is viable.  Her pain is minimal at rest but developed severe pain with ambulation.    Will plan for angiography today.

## 2023-09-06 NOTE — SUBJECTIVE & OBJECTIVE
Interval History:   Objective:     Vital Signs (Most Recent):  Temp: 97.8 °F (36.6 °C) (09/06/23 0739)  Pulse: 89 (09/06/23 0739)  Resp: 18 (09/06/23 0739)  BP: 136/73 (09/06/23 0739)  SpO2: 96 % (09/06/23 0754) Vital Signs (24h Range):  Temp:  [97.7 °F (36.5 °C)-98 °F (36.7 °C)] 97.8 °F (36.6 °C)  Pulse:  [78-93] 89  Resp:  [16-20] 18  SpO2:  [92 %-97 %] 96 %  BP: (122-166)/(62-78) 136/73     Weight: 113.2 kg (249 lb 8 oz)  Body mass index is 37.94 kg/m².    Intake/Output Summary (Last 24 hours) at 9/6/2023 1020  Last data filed at 9/6/2023 0526  Gross per 24 hour   Intake 0 ml   Output 600 ml   Net -600 ml             Physical Exam  Vitals and nursing note reviewed.   Constitutional:       General: She is not in acute distress.     Appearance: She is obese. She is not ill-appearing, toxic-appearing or diaphoretic.   HENT:      Head: Normocephalic and atraumatic.      Nose: Nose normal.      Mouth/Throat:      Mouth: Mucous membranes are moist.   Eyes:      Extraocular Movements: Extraocular movements intact.   Cardiovascular:      Rate and Rhythm: Normal rate and regular rhythm.      Pulses: Decreased pulses (in the RLE. Unable to get dopplered pulses).      Heart sounds: Normal heart sounds. No murmur heard.  Pulmonary:      Effort: Pulmonary effort is normal. No respiratory distress.      Breath sounds: Normal breath sounds. No wheezing.   Abdominal:      General: Bowel sounds are normal.      Palpations: Abdomen is soft. There is no mass.      Tenderness: There is no abdominal tenderness. There is no guarding.      Hernia: No hernia is present.   Musculoskeletal:         General: No swelling, tenderness or deformity. Normal range of motion.      Cervical back: Normal range of motion. No rigidity or tenderness.   Skin:     General: Skin is warm and dry.      Coloration: Skin is not jaundiced.      Findings: No bruising or erythema.   Neurological:      General: No focal deficit present.      Mental Status: She  is alert and oriented to person, place, and time. Mental status is at baseline.   Psychiatric:      Significant Labs: All pertinent labs within the past 24 hours have been reviewed.  CBC:   Recent Labs   Lab 09/05/23  1507 09/06/23  0535   WBC 6.52 5.34   HGB 12.8 12.3   HCT 39.3 37.3    211     CMP:   Recent Labs   Lab 09/05/23  1507 09/06/23  0535    141   K 4.4 3.8    110   CO2 22* 26   GLU 49* 148*   BUN 21 20   CREATININE 1.6* 1.8*   CALCIUM 9.2 9.2   PROT 6.6 6.4   ALBUMIN 3.6 3.4*   BILITOT 0.7 0.7   ALKPHOS 51* 48*   AST 24 23   ALT 15 14   ANIONGAP 9 5*       Significant Imaging: I have reviewed all pertinent imaging results/findings within the past 24 hours.  Sodium

## 2023-09-06 NOTE — SUBJECTIVE & OBJECTIVE
Objective:     Vital Signs (Most Recent):  Temp: 97.8 °F (36.6 °C) (09/06/23 0739)  Pulse: 89 (09/06/23 0739)  Resp: 18 (09/06/23 0739)  BP: 136/73 (09/06/23 0739)  SpO2: 96 % (09/06/23 0754) Vital Signs (24h Range):  Temp:  [97.7 °F (36.5 °C)-98 °F (36.7 °C)] 97.8 °F (36.6 °C)  Pulse:  [78-93] 89  Resp:  [16-20] 18  SpO2:  [92 %-97 %] 96 %  BP: (122-166)/(62-78) 136/73     Weight: 113.2 kg (249 lb 8 oz)  Body mass index is 37.94 kg/m².    Intake/Output Summary (Last 24 hours) at 9/6/2023 1016  Last data filed at 9/6/2023 0526  Gross per 24 hour   Intake 0 ml   Output 600 ml   Net -600 ml                 Significant Labs: Physical Exam  Vitals and nursing note reviewed.   Constitutional:       General: She is not in acute distress.     Appearance: She is obese. She is not ill-appearing, toxic-appearing or diaphoretic.   HENT:      Head: Normocephalic and atraumatic.      Nose: Nose normal.      Mouth/Throat:      Mouth: Mucous membranes are moist.   Eyes:      Extraocular Movements: Extraocular movements intact.   Cardiovascular:      Rate and Rhythm: Normal rate and regular rhythm.      Pulses: Decreased pulses (in the RLE. Unable to get dopplered pulses).      Heart sounds: Normal heart sounds. No murmur heard.  Pulmonary:      Effort: Pulmonary effort is normal. No respiratory distress.      Breath sounds: Normal breath sounds. No wheezing.   Abdominal:      General: Bowel sounds are normal.      Palpations: Abdomen is soft. There is no mass.      Tenderness: There is no abdominal tenderness. There is no guarding.      Hernia: No hernia is present.   Musculoskeletal:         General: No swelling, tenderness or deformity. Normal range of motion.      Cervical back: Normal range of motion. No rigidity or tenderness.   Skin:     General: Skin is warm and dry.      Coloration: Skin is not jaundiced.      Findings: No bruising or erythema.   Neurological:      General: No focal deficit present.      Mental Status:  She is alert and oriented to person, place, and time. Mental status is at baseline.   Psychiatric:         Mood and Affect: Mood normal.         Behavior: Behavior normal.        Significant Imaging: I have reviewed all pertinent imaging results/findings within the past 24 hours.

## 2023-09-06 NOTE — NURSING
Patient's BP elevated to 180s/70s, notified Dr. Armas, no new orders at this time. Anesthesia to monitor BP per MD.  Transport arrived to bring patient to OR, nurse Ricketts notified patient being sent to OR for procedure prior to returning to room 1213.

## 2023-09-06 NOTE — OP NOTE
[facility]  Surgery Department  Operative Note    SUMMARY     Date of Procedure: 9/6/2023     Procedure: Procedure(s) (LRB):  Angiogram Extremity Unilateral (Right)  PTA (ANGIOPLASTY, PERCUTANEOUS, TRANSLUMINAL) (N/A)     Surgeon(s) and Role:     * Cruz Armas MD - Primary    Assisting Surgeon: None    Pre-Operative Diagnosis: Pain [R52]    Post-Operative Diagnosis: Post-Op Diagnosis Codes:     * Pain [R52]    Anesthesia: RN IV Sedation    Operative Findings (including complications, if any):  Right common femoral artery occlusion with collateralization of the profunda and superficial femoral artery beyond the common femoral occlusion    Description of Technical Procedures:  Abdominal aortogram.  Right lower extremity angiogram.  Right common femoral angioplasty with 5 x 40 balloon    Left femoral artery was accessed with ultrasound guidance and 5 Cambodian sheath was placed catheter advanced up to the abdominal aorta abdominal aortogram performed catheter pulled out of the bifurcation selected the right iliac and then advanced the wire down into the right external iliac catheter advanced over the wire right lower extremity angiogram was performed findings are patent abdominal aorta common iliac internal external iliac arteries are patent.  The right common femoral is acutely occluded just beyond the epigastric vessel and there is reconstitution of the profunda and the SFA at their origins the SFA is atherosclerotic but patent with popliteal peroneal and posterior tibial runoff.  We then went up and over the bifurcation with a 6 Cambodian sheath cross the common femoral occlusion and angiogram was performed beyond it firm we were within the true lumen in the SFA which we were.  We then replaced the wire balloon the common femoral occlusion and this caused some luminal gain but there was still what appeared to be soft thrombus and possibly a Angio-Seal collagen plug within the common femoral artery..   Given the history of her recent carotid angiogram and Angio-Seal closure at the common femoral endarterectomy site was decided to terminate the procedure and take her to the OR for open embolectomy.    Significant Surgical Tasks Conducted by the Assistant(s), if Applicable:     Estimated Blood Loss (EBL): * No values recorded between 9/6/2023  1:10 PM and 9/6/2023  2:24 PM *           Implants: * No implants in log *    Specimens:   Specimen (24h ago, onward)      None                    Condition: Good    Disposition: PACU - hemodynamically stable.    Attestation: I was present and scrubbed for the entire procedure.

## 2023-09-06 NOTE — OP NOTE
[facility]  Surgery Department  Operative Note    SUMMARY     Date of Procedure: 9/6/2023     Procedure: Procedure(s) (LRB):  EMBOLECTOMY, LOWER EXTREMITY (Right)  ANGIOGRAM, EXTREMITY, UNILATERAL (Right)     Surgeon(s) and Role:     * Cruz Armas MD - Primary    Assisting Surgeon: None    Pre-Operative Diagnosis: Pain [R52]    Post-Operative Diagnosis: Post-Op Diagnosis Codes:     * Pain [R52]    Anesthesia: General    Operative Findings (including complications, if any):  Right femoral artery thrombosis.  Removal Angio-Seal plug from within the common femoral artery .    Description of Technical Procedures:  Right femoral artery exploration greater than 30 days after previous femoral artery operation.  Right common femoral profunda and superficial femoral artery thrombectomy with removal of retained Angio-Seal plug within the common femoral artery and completion angiogram.    Oblique incision was made in the right groin and dissection down through subcutaneous tissue and very dense scar tissue was performed with hemo cautery and sharp dissection the common femoral and superficial femoral arteries were identified distally and then I dissected more proximally with hemo cautery and sharp dissection through very dense adhesive tissue from her previous femoral endarterectomy.  We are able to get up above the vein patch on the common femoral artery and encircled the external iliac artery with a vessel loop.  Vessel was then placed around the origin of the SFA.  The profunda was encased in dense adhesions and I did not dissect that out completely.  Patient received 99014 units of IV heparin we then opened the patulous vein patch longitudinally and there was complete thrombosis of the common femoral artery with teased out the fresh thrombus with pickups and suction and in that process identified a suture attached to a thrombin plug we removed this and was met by pulsatile flow from the iliac artery.   We controlled that with a 5. Avni balloon and then also performed an embolectomy of any residual thrombus once it good arterial inflow we controlled the iliac artery with the Avni balloon and three-way stopcock.  Embolectomy of the SFA was performed with a 4. And the profunda was probed with the 4. Avni balloon and returned additional thrombus.  Once we had good backbleeding and.  All the thrombus was gone we controlled the profunda with the balloon and the SFA with a vessel loop the arteriotomy was then closed primarily with 6 0 Prolene started at the top and the bottom and tying in the middle after completion of the closure and removal of the balloons a completion angiogram was performed which showed excellent flow through the external iliac common femoral junction the profunda is widely patent the SFA was patent with what is already known to have chronic atherosclerotic stenosis throughout it as a baseline.  Patient received 100 mg of protamine to reverse her 09239 units of IV heparin.  After obtain hemostasis groin was closed the deep layer 3-0 Vicryl in 4 layers and skin was closed with skin staples.    Significant Surgical Tasks Conducted by the Assistant(s), if Applicable:     Estimated Blood Loss (EBL): * No values recorded between 9/6/2023  5:23 PM and 9/6/2023  6:38 PM *           Implants: * No implants in log *    Specimens:   Specimen (24h ago, onward)       Start     Ordered    09/06/23 1805  Specimen to Pathology - Surgery  Once        Comments: Pre-op Diagnosis: Pain [R52]Procedure(s):EMBOLECTOMY, LOWER EXTREMITY Number of specimens: 1Name of specimens: right femoral plug     Question:  Release to patient  Answer:  Immediate    09/06/23 1806                            Condition: Stable    Disposition: ICU - extubated and stable.    Attestation: I was present and scrubbed for the entire procedure.

## 2023-09-06 NOTE — CARE UPDATE
09/05/23 1958   Patient Assessment/Suction   Level of Consciousness (AVPU) alert   Respiratory Effort Normal;Unlabored   Expansion/Accessory Muscles/Retractions no use of accessory muscles   PRE-TX-O2   Device (Oxygen Therapy) room air   SpO2 96 %   Pulse Oximetry Type Intermittent   $ Pulse Oximetry - Multiple Charge Pulse Oximetry - Multiple   Pulse 85   Resp 18   Positioning   Head of Bed (HOB) Positioning HOB elevated   Respiratory Evaluation   $ Care Plan Tech Time 15 min

## 2023-09-07 VITALS
HEIGHT: 68 IN | BODY MASS INDEX: 37.29 KG/M2 | DIASTOLIC BLOOD PRESSURE: 60 MMHG | TEMPERATURE: 97 F | WEIGHT: 246.06 LBS | RESPIRATION RATE: 21 BRPM | HEART RATE: 75 BPM | SYSTOLIC BLOOD PRESSURE: 109 MMHG | OXYGEN SATURATION: 94 %

## 2023-09-07 LAB
ALBUMIN SERPL BCP-MCNC: 3.6 G/DL (ref 3.5–5.2)
ALP SERPL-CCNC: 51 U/L (ref 55–135)
ALT SERPL W/O P-5'-P-CCNC: 15 U/L (ref 10–44)
ANION GAP SERPL CALC-SCNC: 5 MMOL/L (ref 8–16)
AST SERPL-CCNC: 21 U/L (ref 10–40)
BASOPHILS # BLD AUTO: 0.04 K/UL (ref 0–0.2)
BASOPHILS NFR BLD: 0.5 % (ref 0–1.9)
BILIRUB SERPL-MCNC: 0.7 MG/DL (ref 0.1–1)
BUN SERPL-MCNC: 19 MG/DL (ref 8–23)
CALCIUM SERPL-MCNC: 9 MG/DL (ref 8.7–10.5)
CHLORIDE SERPL-SCNC: 110 MMOL/L (ref 95–110)
CO2 SERPL-SCNC: 26 MMOL/L (ref 23–29)
CREAT SERPL-MCNC: 1.5 MG/DL (ref 0.5–1.4)
DIFFERENTIAL METHOD: ABNORMAL
EOSINOPHIL # BLD AUTO: 0.1 K/UL (ref 0–0.5)
EOSINOPHIL NFR BLD: 1.7 % (ref 0–8)
ERYTHROCYTE [DISTWIDTH] IN BLOOD BY AUTOMATED COUNT: 13.2 % (ref 11.5–14.5)
EST. GFR  (NO RACE VARIABLE): 34.8 ML/MIN/1.73 M^2
GLUCOSE SERPL-MCNC: 162 MG/DL (ref 70–110)
HCT VFR BLD AUTO: 37.4 % (ref 37–48.5)
HGB BLD-MCNC: 12 G/DL (ref 12–16)
IMM GRANULOCYTES # BLD AUTO: 0.02 K/UL (ref 0–0.04)
IMM GRANULOCYTES NFR BLD AUTO: 0.3 % (ref 0–0.5)
LYMPHOCYTES # BLD AUTO: 1.5 K/UL (ref 1–4.8)
LYMPHOCYTES NFR BLD: 19.1 % (ref 18–48)
MCH RBC QN AUTO: 32.9 PG (ref 27–31)
MCHC RBC AUTO-ENTMCNC: 32.1 G/DL (ref 32–36)
MCV RBC AUTO: 103 FL (ref 82–98)
MONOCYTES # BLD AUTO: 0.8 K/UL (ref 0.3–1)
MONOCYTES NFR BLD: 9.8 % (ref 4–15)
NEUTROPHILS # BLD AUTO: 5.4 K/UL (ref 1.8–7.7)
NEUTROPHILS NFR BLD: 68.6 % (ref 38–73)
NRBC BLD-RTO: 0 /100 WBC
PLATELET # BLD AUTO: 215 K/UL (ref 150–450)
PMV BLD AUTO: 9.4 FL (ref 9.2–12.9)
POTASSIUM SERPL-SCNC: 4 MMOL/L (ref 3.5–5.1)
PROT SERPL-MCNC: 6.4 G/DL (ref 6–8.4)
RBC # BLD AUTO: 3.65 M/UL (ref 4–5.4)
SODIUM SERPL-SCNC: 141 MMOL/L (ref 136–145)
WBC # BLD AUTO: 7.84 K/UL (ref 3.9–12.7)

## 2023-09-07 PROCEDURE — 99900035 HC TECH TIME PER 15 MIN (STAT)

## 2023-09-07 PROCEDURE — 25000003 PHARM REV CODE 250

## 2023-09-07 PROCEDURE — 25000003 PHARM REV CODE 250: Performed by: SURGERY

## 2023-09-07 PROCEDURE — 94761 N-INVAS EAR/PLS OXIMETRY MLT: CPT

## 2023-09-07 PROCEDURE — 99900031 HC PATIENT EDUCATION (STAT)

## 2023-09-07 PROCEDURE — S4991 NICOTINE PATCH NONLEGEND: HCPCS | Performed by: SURGERY

## 2023-09-07 PROCEDURE — 85025 COMPLETE CBC W/AUTO DIFF WBC: CPT

## 2023-09-07 PROCEDURE — 94799 UNLISTED PULMONARY SVC/PX: CPT

## 2023-09-07 PROCEDURE — 25000003 PHARM REV CODE 250: Performed by: INTERNAL MEDICINE

## 2023-09-07 PROCEDURE — 36415 COLL VENOUS BLD VENIPUNCTURE: CPT

## 2023-09-07 PROCEDURE — 80053 COMPREHEN METABOLIC PANEL: CPT

## 2023-09-07 RX ORDER — IBUPROFEN 200 MG
1 TABLET ORAL DAILY
Qty: 14 PATCH | Refills: 0 | Status: SHIPPED | OUTPATIENT
Start: 2023-09-07

## 2023-09-07 RX ADMIN — LISINOPRIL 10 MG: 10 TABLET ORAL at 08:09

## 2023-09-07 RX ADMIN — NICOTINE 1 PATCH: 14 PATCH, EXTENDED RELEASE TRANSDERMAL at 08:09

## 2023-09-07 RX ADMIN — POLYETHYLENE GLYCOL 3350 17 G: 17 POWDER, FOR SOLUTION ORAL at 08:09

## 2023-09-07 RX ADMIN — MUPIROCIN 1 G: 20 OINTMENT TOPICAL at 08:09

## 2023-09-07 RX ADMIN — METOPROLOL TARTRATE 50 MG: 50 TABLET, FILM COATED ORAL at 08:09

## 2023-09-07 RX ADMIN — PANTOPRAZOLE SODIUM 40 MG: 40 TABLET, DELAYED RELEASE ORAL at 05:09

## 2023-09-07 RX ADMIN — ASPIRIN 81 MG: 81 TABLET, COATED ORAL at 08:09

## 2023-09-07 RX ADMIN — GABAPENTIN 300 MG: 300 CAPSULE ORAL at 08:09

## 2023-09-07 RX ADMIN — CILOSTAZOL 100 MG: 100 TABLET ORAL at 08:09

## 2023-09-07 RX ADMIN — ATORVASTATIN CALCIUM 40 MG: 40 TABLET, FILM COATED ORAL at 08:09

## 2023-09-07 RX ADMIN — AMLODIPINE BESYLATE 5 MG: 5 TABLET ORAL at 08:09

## 2023-09-07 RX ADMIN — HYDROCODONE BITARTRATE AND ACETAMINOPHEN 1 TABLET: 5; 325 TABLET ORAL at 12:09

## 2023-09-07 RX ADMIN — LEVOTHYROXINE SODIUM 25 MCG: 0.03 TABLET ORAL at 05:09

## 2023-09-07 NOTE — PLAN OF CARE
Ok to discharge per Dr Armas.  Vieira removed.  Urinating with out difficulty.  Tolerating diet.  Denies pain.  Pulses dopplered and present to bilateral feet.  Warm to touch.  Oob to chair and bsc with stand by assistance.  Instructions discussed with patient who verbalized understanding.  IV cath removed with tip in tact and site covered.

## 2023-09-07 NOTE — ANESTHESIA POSTPROCEDURE EVALUATION
Anesthesia Post Evaluation    Patient: Mary Cynthia Cryer    Procedure(s) Performed: Procedure(s) (LRB):  EMBOLECTOMY, LOWER EXTREMITY (Right)  ANGIOGRAM, EXTREMITY, UNILATERAL (Right)    Final Anesthesia Type: general      Patient location during evaluation: ICU  Patient participation: Yes- Able to Participate  Level of consciousness: awake and alert and oriented  Post-procedure vital signs: reviewed and stable  Pain management: adequate  Airway patency: patent    PONV status at discharge: No PONV  Anesthetic complications: no      Cardiovascular status: blood pressure returned to baseline, hemodynamically stable and stable  Respiratory status: unassisted, spontaneous ventilation and nasal cannula  Hydration status: euvolemic  Follow-up not needed.          Vitals Value Taken Time   /67 09/07/23 0930   Temp 36.8 °C (98.3 °F) 09/07/23 0800   Pulse 81 09/07/23 0943   Resp 16 09/07/23 0943   SpO2 93 % 09/07/23 0943   Vitals shown include unvalidated device data.      No case tracking events are documented in the log.      Pain/Gaston Score: Pain Rating Prior to Med Admin: 6 (9/6/2023  7:57 PM)  Pain Rating Post Med Admin: 0 (9/6/2023  8:57 PM)  Gaston Score: 10 (9/6/2023  7:01 PM)

## 2023-09-07 NOTE — CARE UPDATE
09/06/23 2025   Patient Assessment/Suction   Level of Consciousness (AVPU) alert   Respiratory Effort Unlabored   Expansion/Accessory Muscles/Retractions no use of accessory muscles   All Lung Fields Breath Sounds coarse   Rhythm/Pattern, Respiratory unlabored   PRE-TX-O2   Device (Oxygen Therapy) room air   SpO2 97 %   Pulse Oximetry Type Continuous   $ Pulse Oximetry - Multiple Charge Pulse Oximetry - Multiple   Pulse 103   Resp 12   Aerosol Therapy   $ Aerosol Therapy Charges PRN treatment not required   Respiratory Evaluation   $ Care Plan Tech Time 15 min   $ Eval/Re-eval Charges Re-evaluation

## 2023-09-07 NOTE — CARE UPDATE
Patient has prn tx available   09/07/23 0820   Patient Assessment/Suction   Level of Consciousness (AVPU) alert   Respiratory Effort Normal;Unlabored   Expansion/Accessory Muscles/Retractions expansion symmetric;no use of accessory muscles   All Lung Fields Breath Sounds clear;diminished   KATELYN Breath Sounds clear   LLL Breath Sounds diminished   RUL Breath Sounds clear   RML Breath Sounds diminished   RLL Breath Sounds diminished   Rhythm/Pattern, Respiratory unlabored;pattern regular;no shortness of breath reported   Cough Frequency infrequent   Cough Type good;dry   PRE-TX-O2   Device (Oxygen Therapy) room air   SpO2 95 %   Pulse Oximetry Type Continuous   $ Pulse Oximetry - Multiple Charge Pulse Oximetry - Multiple   Pulse 89   Resp (!) 23   BP (!) 119/55   Aerosol Therapy   $ Aerosol Therapy Charges PRN treatment not required   Education   $ Education Bronchodilator;15 min   Respiratory Evaluation   $ Care Plan Tech Time 15 min   $ Eval/Re-eval Charges Re-evaluation

## 2023-09-07 NOTE — NURSING
Oob to chair min-stand by assistance.  Vieira removed.  Instructed to call for assistance when needing to void.

## 2023-09-07 NOTE — PROGRESS NOTES
Postop right femoral thrombectomy.  Hematocrit stable.  Groin is soft.  Biphasic posterior tibial Doppler signal of the right foot.  Neurologically intact and her pain is resolved.  Okay to start to ambulate.  Okay to discharge whenever cleared by medicine.  Follow-up with me in 1-2 weeks.  Will ask her to obtain her angiogram of her carotid arteries from [facility] to determine if carotid endarterectomy as needed.

## 2023-09-07 NOTE — PHYSICIAN QUERY
PT Name: Mary Cynthia Cryer  MR #: [id number]    DOCUMENTATION CLARIFICATION     CDS/: Keerthi Mccrary               Contact information: [phone] or through epic messenger    This form is a permanent document in the medical record.     Query Date: September 6, 2023    Dear Provider,  By submitting this query, we are merely seeking further clarification of documentation. Please utilize your independent clinical judgment when addressing the question(s) below.    The medical record contains the following:  Supporting Clinical Findings Location in Medical Record     In late August she underwent carotid angiography through a right femoral access with Angio-Seal closure at [place].  About 3 days ago she started with severe right foot pain and came  to the ER last night.  Arterial duplex shows minimal flow distal to the common femoral artery.   She has motor function of the right foot and it is a little bit cooler than the left but is viable.  Her pain is minimal at rest but developed severe pain with ambulation.   Will plan for angiography today.      but there was  still what appeared to be soft thrombus and possibly a Angio-Seal collagen plug within the common femoral artery..  Given the history of her recent carotid angiogram and Angio-Seal closure at the common femoral endarterectomy site was decided to terminate  the procedure and take her to the OR for open embolectomy.     Description of Technical Procedures:       Right femoral artery exploration greater than 30 days after previous femoral artery operation.  Right common femoral profunda and superficial  femoral artery thrombectomy with removal of retained Angio-Seal plug within the common femoral artery and completion angiogram.    Dr Armas's consult 9/6                        OR note 9/6       Please clarify if the Right Common Femoral arterial thrombus, Angio-Seal collagen plug (as it relates to carotid angiography through a right femoral access with  Angio-Seal closure at [facility] is:      [  ] Inherent/Integral to the procedure   [  ] Complication of the procedure   [  ] Present, but not a complication of the procedure   [  ] Incidental finding, not clinically significant   [  ] Intended/required to complete procedure   [  ] Other (please specify):            Please document in your progress notes daily for the duration of treatment until resolved and include in your discharge summary.

## 2023-09-07 NOTE — PLAN OF CARE
DC orders and chart reviewed. No discharge needs noted.  Patient cleared for discharge from .  Patient is discharging home.   attempted to schedule hospital follow up with Dr. Armas.  Per Dr. Armas's office staff (Aby), they have a new system and requested patient's demographic sheet.   sent demographics and clinicals via [employer] to office.  Aby stated she will call the patient to schedule follow up appointment.         09/07/23 1029   Final Note   Assessment Type Final Discharge Note   Anticipated Discharge Disposition Home   What phone number can be called within the next 1-3 days to see how you are doing after discharge? [phone]   Hospital Resources/Appts/Education Provided Provided patient/caregiver with written discharge plan information   Post-Acute Status   Discharge Delays None known at this time

## 2023-09-07 NOTE — PLAN OF CARE
Discharge instructions discussed with patient and friend.  Needs reinforcement.  Per friend, f/u appt already set with Dr Armas's office.  IV cath removed.  Belongings gathered, including purse shoes and dress left in previous room.  Reinforced instructions and education.  Smoking cessation discussed and proper glycemic control.  Verbalized understanding.

## 2023-09-07 NOTE — DISCHARGE SUMMARY
Critical access hospital Medicine  Discharge Summary      Patient Name: Mary Cynthia Cryer  MRN: [id number]  AKANKSHA: [id number]  Patient Class: IP- Inpatient  Admission Date: 9/5/2023  Hospital Length of Stay: 2 days  Discharge Date and Time: 9/7/2023  3:00 PM  Attending Physician: No att. providers found   Discharging Provider: Hyun Schneider MD  Primary Care Provider: Emmy Horton MD    Primary Care Team: Networked reference to record PCT     HPI:   82 y/o female with history of PVD, DM, HLD, and kidney disease presents to the ED for complaints of increasing numbness, tingling, and pain in her RLE. Patient had an angiogram 1 week prior to presentation. Patient states that she also feels like her right leg has been colder for the last day or so. Patient denies any SOB, coughing, hemoptysis, or left leg pain.         Procedure(s) (LRB):  EMBOLECTOMY, LOWER EXTREMITY (Right)  ANGIOGRAM, EXTREMITY, UNILATERAL (Right)      Hospital Course:   Presented with right lower extremity numbness and tingling.  She has history of right femoral endarterectomy for severe atherosclerotic occlusive disease and right SFA atherectomy and angioplasty 1 year prior.  She underwent carotid angiography through right femoral access shortly prior to admission.  She began to have severe right foot pain about a week and a half later.  Arterial duplex showed minimal flow to the distal common femoral artery and she was evaluated by vascular surgery and underwent right femoral thrombectomy on 09/06.  She tolerated procedure well and was discharged home in stable condition with close vascular surgery follow up.       Goals of Care Treatment Preferences:  Code Status: DNR    PE:  GENERAL:  Alert and oriented x 3  HEENT:  EOMI. Conjunctivae intact. Posterior pharynx clear, oral mucosa moist  NECK:  Supple   LUNGS:  No respiratory distress. Clear to auscultation bilaterally with good air movement  CARDIAC:  RRR without murmur, rub or  gallop  ABDOMEN:  Soft,  Nontender and nondistended, no rebound or guarding, bowel sounds present   EXTREMITIES:  Peripheral pulses are 2+. Hands and feet are warm. Good capillary refill in fingers (< 2 seconds). No clubbing, cyanosis or edema  SKIN:  Skin color, texture and turgor normal. No rashes, ulcerations or nodules noted    Consults:   Consults (From admission, onward)        Status Ordering Provider     IP consult to case management  Once        Provider:  (Not yet assigned)    Completed CRUZ OLIVEIRA     Inpatient consult to Vascular Surgery  Once        Provider:  (Not yet assigned)    Completed SHELIA FLORIAN          No new Assessment & Plan notes have been filed under this hospital service since the last note was generated.  Service: Hospital Medicine    Final Active Diagnoses:    Diagnosis Date Noted POA    PRINCIPAL PROBLEM:  PVD (peripheral vascular disease) [I73.9] 08/21/2017 Yes    Diabetes mellitus type 2 in obese [E11.69, E66.9] 06/24/2023 Yes     Chronic    Hypertension [I10] 07/21/2022 Yes     Chronic    Tobacco abuse [Z72.0]  Yes     Chronic    Hypothyroidism [E03.9] 08/21/2017 Yes     Chronic      Problems Resolved During this Admission:       Discharged Condition: good    Disposition: Home or Self Care    Follow Up:   Follow-up Information     Cruz Armas MD. Call in 1 day(s).    Specialties: Vascular Surgery, Cardiology  Why: Office will call you to schedule follow up appointment. If you do not hear from them by tomorrow, please give the office a call.  Contact information:  [address] [id number] [phone]                       Patient Instructions:      Ambulatory referral/consult to Smoking Cessation Program   Standing Status: Future   Referral Priority: Routine Referral Type: Consultation   Referral Reason: Specialty Services Required   Requested Specialty: CTTS   Number of Visits Requested: 1       Significant Diagnostic Studies:  N/A    Pending Diagnostic Studies:     Procedure Component Value Units Date/Time    Specimen to Pathology - Surgery [[id number]] Collected: 09/06/23 1067    Order Status: Sent Lab Status: No result     Specimen: Tissue          Medications:  Reconciled Home Medications:      Medication List      START taking these medications    nicotine 14 mg/24 hr  Commonly known as: NICODERM CQ  Place 1 patch onto the skin once daily.        CHANGE how you take these medications    amLODIPine 5 MG tablet  Commonly known as: NORVASC  Take 5 mg by mouth once daily.  What changed: Another medication with the same name was removed. Continue taking this medication, and follow the directions you see here.     aspirin 81 MG EC tablet  Commonly known as: ECOTRIN  Take 81 mg by mouth once daily.  What changed: Another medication with the same name was removed. Continue taking this medication, and follow the directions you see here.     isosorbide mononitrate 30 MG 24 hr tablet  Commonly known as: IMDUR  Take 30 mg by mouth once daily.  What changed: Another medication with the same name was removed. Continue taking this medication, and follow the directions you see here.     lisinopriL 10 MG tablet  Take 10 mg by mouth once daily.  What changed: Another medication with the same name was removed. Continue taking this medication, and follow the directions you see here.     metFORMIN 500 MG tablet  Commonly known as: GLUCOPHAGE  Take 500 mg by mouth 2 (two) times daily with meals.  What changed: Another medication with the same name was removed. Continue taking this medication, and follow the directions you see here.     metoprolol tartrate 50 MG tablet  Commonly known as: LOPRESSOR  Take 50 mg by mouth 2 (two) times daily.  What changed: Another medication with the same name was removed. Continue taking this medication, and follow the directions you see here.     pantoprazole 40 MG tablet  Commonly known as: PROTONIX  Take 40 mg by mouth  "once daily.  What changed: Another medication with the same name was removed. Continue taking this medication, and follow the directions you see here.     rosuvastatin 40 MG Tab  Commonly known as: CRESTOR  Take 40 mg by mouth once daily.  What changed: Another medication with the same name was removed. Continue taking this medication, and follow the directions you see here.     triamterene-hydrochlorothiazide 37.5-25 mg 37.5-25 mg per tablet  Commonly known as: MAXZIDE-25  Take 1 tablet by mouth once daily.  What changed: Another medication with the same name was removed. Continue taking this medication, and follow the directions you see here.        CONTINUE taking these medications    albuterol 90 mcg/actuation inhaler  Commonly known as: PROVENTIL/VENTOLIN HFA  Inhale 2 puffs into the lungs every 6 (six) hours as needed.     cilostazoL 100 MG Tab  Commonly known as: PLETAL  Take 100 mg by mouth 2 (two) times daily.     clopidogreL 75 mg tablet  Commonly known as: PLAVIX  Take 75 mg by mouth once daily.     collagenase ointment  Commonly known as: SANTYL  Apply topically once daily.     DROPLET INSULIN SYRINGE 1 mL 31 gauge x 15/64" Syrg  Generic drug: insulin syringe-needle U-100     gabapentin 100 MG capsule  Commonly known as: NEURONTIN  Take 100 mg by mouth 3 (three) times daily.     insulin glargine 100 unit/mL injection  Commonly known as: Lantus  Inject 35 Units into the skin once daily. And takes 20 units q pm     insulin syringe-needle U-100 0.3 mL 30 gauge x 5/16" Syrg  by Other route.     * levothyroxine 25 MCG tablet  Commonly known as: SYNTHROID  Take 1 tablet by mouth once daily.     * levothyroxine 25 MCG tablet  Commonly known as: SYNTHROID  Take 25 mcg by mouth once daily.     MYRBETRIQ 25 mg Tb24 ER tablet  Generic drug: mirabegron  Take 25 mg by mouth once daily.     nitroGLYCERIN 0.3 MG SL tablet  Commonly known as: NITROSTAT  Place 0.3 mg under the tongue every 5 (five) minutes as needed.   "   NovoLIN R Regular U100 Insulin 100 unit/mL injection  Generic drug: insulin regular  Inject 6 Units into the skin 2 (two) times daily before meals.     potassium chloride 8 MEQ Tbsr  Commonly known as: KLOR-CON  Take 8 mEq by mouth once daily.     * solifenacin 10 MG tablet  Commonly known as: VESICARE  Take 1 tablet by mouth once daily.     * solifenacin 10 MG tablet  Commonly known as: VESICARE  Take 10 mg by mouth once daily.     traMADoL 50 mg tablet  Commonly known as: ULTRAM  Take 50 mg by mouth every 8 (eight) hours as needed.     TRUE METRIX GLUCOSE TEST STRIP Strp  Generic drug: blood sugar diagnostic     TRUEPLUS LANCETS 30 gauge Misc  Generic drug: lancets         * This list has 4 medication(s) that are the same as other medications prescribed for you. Read the directions carefully, and ask your doctor or other care provider to review them with you.            STOP taking these medications    EFFER-K disintegrating tablet  Generic drug: potassium bicarbonate            Indwelling Lines/Drains at time of discharge:   Lines/Drains/Airways     None                 Time spent on the discharge of patient: 33 minutes    Hyun Schneider MD  Department of Hospital Medicine  [facility]

## 2023-09-07 NOTE — PROGRESS NOTES
"[facility]  Adult Nutrition   Progress Note (Initial Assessment)     SUMMARY     Recommendations  Recommendation/Intervention:   1. Continue current diet,   2. [place] food prefs as diet allows.   3. RD to monitor and provide recommendations prn.  Goals: Maintain po intake > 75% at meals to meet EEN/EPN.  Nutrition Goal Status: progressing towards goal    Dietitian Rounds Brief  Pt is up in chair. State she is feeling better. State she has good appetite and po intake.    Diet order:   Current Diet Order: DM diet        Evaluation of Received Nutrient/Fluid Intake  Energy Calories Required: meeting needs  Protein Required: meeting needs  Fluid Required: meeting needs  Tolerance: tolerating     % Intake of Estimated Energy Needs: 75 - 100 %  % Meal Intake: 75 - 100 %      Intake/Output Summary (Last 24 hours) at 9/7/2023 1103  Last data filed at 9/7/2023 1051  Gross per 24 hour   Intake 2260 ml   Output 3100 ml   Net -840 ml        Anthropometrics  Temp: 98.3 °F (36.8 °C)  Height Method: Stated  Height: 5' 8" (172.7 cm)  Height (inches): 68 in  Weight Method: Bed Scale  Weight: 111.6 kg (246 lb 0.5 oz)  Weight (lb): 246.04 lb  Ideal Body Weight (IBW), Female: 140 lb  % Ideal Body Weight, Female (lb): 178.21 %  BMI (Calculated): 37.4  BMI Grade: 35 - 39.9 - obesity - grade II       Estimated/Assessed Needs  Weight Used For Calorie Calculations: 111.6 kg (246 lb 0.5 oz)  Energy Calorie Requirements (kcal): 7028-3953 (20-25 kcal/ kg bw)  Energy Need Method: Kcal/kg  Protein Requirements: 111-134 (1.0-1.2gm /kg bw)  Weight Used For Protein Calculations: 111.6 kg (246 lb 0.5 oz)     Estimated Fluid Requirement Method: RDA Method  RDA Method (mL): 2200       Reason for Assessment  Reason For Assessment: identified at risk by screening criteria  Relevant Medical History: PVD, DM, HLD, and kidney disease presents to the ED for complaints of increasing numbness, tingling, and pain in her RLE  Interdisciplinary " Rounds: did not attend    Nutrition/Diet History  Patient Reported Diet/Restrictions/Preferences: general  Spiritual, Cultural Beliefs, Zoroastrianism Practices, Values that Affect Care: no  Food Allergies: NKFA  Factors Affecting Nutritional Intake: None identified at this time    Nutrition Risk Screen  Nutrition Risk Screen: no indicators present     MST Score: 0  Have you recently lost weight without trying?: No  Weight loss score: 0  Have you been eating poorly because of a decreased appetite?: No  Appetite score: 0       Weight History:  Wt Readings from Last 10 Encounters:   09/06/23 111.6 kg (246 lb 0.5 oz)   06/29/23 114 kg (251 lb 5.2 oz)   06/26/23 115 kg (253 lb 8.5 oz)   06/25/23 113.9 kg (251 lb)   05/09/23 122.5 kg (270 lb)   01/18/23 122.5 kg (270 lb)   12/15/22 122.5 kg (270 lb 1 oz)   09/09/22 122.5 kg (270 lb)   07/20/22 125.6 kg (277 lb)   07/18/22 126 kg (277 lb 12.5 oz)        Lab/Procedures/Meds: Pertinent Labs/Meds Reviewed    Medications:Pertinent Medications Reviewed  Scheduled Meds:   amLODIPine  5 mg Oral Daily    aspirin  81 mg Oral Daily    atorvastatin  40 mg Oral Daily    cilostazoL  100 mg Oral BID    enoxparin  40 mg Subcutaneous Daily    gabapentin  300 mg Oral BID    levothyroxine  25 mcg Oral Before breakfast    lisinopriL  10 mg Oral Daily    metoprolol tartrate  50 mg Oral BID    mupirocin   Nasal BID    nicotine  1 patch Transdermal Daily    pantoprazole  40 mg Oral Before breakfast    polyethylene glycol  17 g Oral Daily     Continuous Infusions:   sodium chloride 0.9% Stopped (09/07/23 1030)     PRN Meds:.acetaminophen, acetaminophen, albuterol-ipratropium, calcium gluconate IVPB, calcium gluconate IVPB, calcium gluconate IVPB, dextrose 50%, glucagon (human recombinant), HYDROcodone-acetaminophen, HYDROmorphone, insulin aspart U-100, magnesium sulfate IVPB, magnesium sulfate IVPB, melatonin, nitroGLYCERIN, ondansetron, ondansetron, potassium chloride **AND** potassium chloride  **AND** potassium chloride, sodium chloride 0.9%, sodium phosphate 15 mmol in dextrose 5 % (D5W) 250 mL IVPB, sodium phosphate 20.01 mmol in dextrose 5 % (D5W) 250 mL IVPB, sodium phosphate 30 mmol in dextrose 5 % (D5W) 250 mL IVPB    Labs: Pertinent Labs Reviewed  Clinical Chemistry:  Recent Labs   Lab 09/05/23  1507 09/06/23  0535 09/07/23  0422    141 141   K 4.4 3.8 4.0    110 110   CO2 22* 26 26   GLU 49* 148* 162*   BUN 21 20 19   CREATININE 1.6* 1.8* 1.5*   CALCIUM 9.2 9.2 9.0   PROT 6.6 6.4 6.4   ALBUMIN 3.6 3.4* 3.6   BILITOT 0.7 0.7 0.7   ALKPHOS 51* 48* 51*   AST 24 23 21   ALT 15 14 15   ANIONGAP 9 5* 5*   MG 1.9  --   --      CBC:   Recent Labs   Lab 09/07/23  0422   WBC 7.84   RBC 3.65*   HGB 12.0   HCT 37.4      *   MCH 32.9*   MCHC 32.1     Diabetes:  Recent Labs   Lab 09/05/23  1507   HGBA1C 6.3*       Monitor and Evaluation  Food and Nutrient Intake: energy intake, food and beverage intake  Food and Nutrient Adminstration: diet order  Knowledge/Beliefs/Attitudes: food and nutrition knowledge/skill, beliefs and attitudes  Physical Activity and Function: nutrition-related ADLs and IADLs  Anthropometric Measurements: weight, weight change, height/length  Biochemical Data, Medical Tests and Procedures: glucose/endocrine profile, inflammatory profile, lipid profile  Nutrition-Focused Physical Findings: overall appearance     Nutrition Risk  Level of Risk/Frequency of Follow-up: moderate - high     Nutrition Follow-Up  RD Follow-up?: Yes      Nicole Javier, LAQUITA 09/07/2023 11:03 AM

## 2023-09-07 NOTE — HPI
84 y/o female with history of PVD, DM, HLD, and kidney disease presents to the ED for complaints of increasing numbness, tingling, and pain in her RLE. Patient had an angiogram 1 week prior to presentation. Patient states that she also feels like her right leg has been colder for the last day or so. Patient denies any SOB, coughing, hemoptysis, or left leg pain.

## 2023-09-11 ENCOUNTER — PATIENT OUTREACH (OUTPATIENT)
Dept: ADMINISTRATIVE | Facility: CLINIC | Age: 83
End: 2023-09-11
Payer: MEDICARE

## 2023-09-11 ENCOUNTER — PATIENT MESSAGE (OUTPATIENT)
Dept: ADMINISTRATIVE | Facility: CLINIC | Age: 83
End: 2023-09-11
Payer: MEDICARE

## 2023-09-11 NOTE — PROGRESS NOTES
C3 nurse attempted to contact Mary Cynthia Cryer  for a TCC post hospital discharge follow up call. No answer. The patient does not have a scheduled HOSFU appointment, and the pt does not have an [facility] PCP.

## 2023-09-12 NOTE — PROGRESS NOTES
3rd Attempt made to reach patient for TCC call. Left voicemail please call [phone] leave first name, last name, and  for Nathalie I will return your call.

## 2023-09-12 NOTE — PROGRESS NOTES
2nd Attempt made to reach patient for TCC call. Left voicemail please call [phone] leave first name, last name, and  for Nathalie I will return your call.

## 2023-09-25 PROBLEM — N18.30 ACUTE RENAL FAILURE SUPERIMPOSED ON STAGE 3 CHRONIC KIDNEY DISEASE: Chronic | Status: RESOLVED | Noted: 2023-06-24 | Resolved: 2023-09-25

## 2023-09-25 PROBLEM — N17.9 ACUTE RENAL FAILURE SUPERIMPOSED ON STAGE 3 CHRONIC KIDNEY DISEASE: Chronic | Status: RESOLVED | Noted: 2023-06-24 | Resolved: 2023-09-25

## 2024-09-19 ENCOUNTER — HOSPITAL ENCOUNTER (INPATIENT)
Facility: HOSPITAL | Age: 84
LOS: 2 days | Discharge: HOME | DRG: 191 | End: 2024-09-22
Attending: EMERGENCY MEDICINE | Admitting: INTERNAL MEDICINE
Payer: MEDICARE

## 2024-09-19 DIAGNOSIS — A41.9 SEPSIS: Primary | ICD-10-CM

## 2024-09-19 DIAGNOSIS — I21.4 NSTEMI (NON-ST ELEVATED MYOCARDIAL INFARCTION): ICD-10-CM

## 2024-09-19 DIAGNOSIS — R06.02 SOB (SHORTNESS OF BREATH): ICD-10-CM

## 2024-09-19 DIAGNOSIS — R09.02 HYPOXIA: ICD-10-CM

## 2024-09-19 DIAGNOSIS — R07.9 CHEST PAIN: ICD-10-CM

## 2024-09-19 DIAGNOSIS — J44.1 CHRONIC OBSTRUCTIVE PULMONARY DISEASE WITH ACUTE EXACERBATION: Chronic | ICD-10-CM

## 2024-09-19 DIAGNOSIS — J44.1 COPD EXACERBATION: ICD-10-CM

## 2024-09-19 LAB
ALBUMIN SERPL BCP-MCNC: 3.5 G/DL (ref 3.5–5.2)
ALP SERPL-CCNC: 58 U/L (ref 55–135)
ALT SERPL W/O P-5'-P-CCNC: 8 U/L (ref 10–44)
ANION GAP SERPL CALC-SCNC: 8 MMOL/L (ref 8–16)
AST SERPL-CCNC: 10 U/L (ref 10–40)
BASOPHILS # BLD AUTO: 0.03 K/UL (ref 0–0.2)
BASOPHILS NFR BLD: 0.2 % (ref 0–1.9)
BILIRUB SERPL-MCNC: 0.7 MG/DL (ref 0.1–1)
BNP SERPL-MCNC: 135 PG/ML (ref 0–99)
BUN SERPL-MCNC: 19 MG/DL (ref 8–23)
CALCIUM SERPL-MCNC: 9 MG/DL (ref 8.7–10.5)
CHLORIDE SERPL-SCNC: 107 MMOL/L (ref 95–110)
CO2 SERPL-SCNC: 25 MMOL/L (ref 23–29)
CREAT SERPL-MCNC: 1.4 MG/DL (ref 0.5–1.4)
DIFFERENTIAL METHOD BLD: ABNORMAL
EOSINOPHIL # BLD AUTO: 0 K/UL (ref 0–0.5)
EOSINOPHIL NFR BLD: 0.2 % (ref 0–8)
ERYTHROCYTE [DISTWIDTH] IN BLOOD BY AUTOMATED COUNT: 13.3 % (ref 11.5–14.5)
EST. GFR  (NO RACE VARIABLE): 37.6 ML/MIN/1.73 M^2
GLUCOSE SERPL-MCNC: 216 MG/DL (ref 70–110)
HCT VFR BLD AUTO: 44 % (ref 37–48.5)
HGB BLD-MCNC: 14.6 G/DL (ref 12–16)
IMM GRANULOCYTES # BLD AUTO: 0.07 K/UL (ref 0–0.04)
IMM GRANULOCYTES NFR BLD AUTO: 0.6 % (ref 0–0.5)
INFLUENZA A, MOLECULAR: NEGATIVE
INFLUENZA B, MOLECULAR: NEGATIVE
LDH SERPL L TO P-CCNC: 0.73 MMOL/L (ref 0.5–2.2)
LDH SERPL L TO P-CCNC: 1.14 MMOL/L (ref 0.5–2.2)
LYMPHOCYTES # BLD AUTO: 1.1 K/UL (ref 1–4.8)
LYMPHOCYTES NFR BLD: 8.8 % (ref 18–48)
MAGNESIUM SERPL-MCNC: 1.8 MG/DL (ref 1.6–2.6)
MCH RBC QN AUTO: 32.4 PG (ref 27–31)
MCHC RBC AUTO-ENTMCNC: 33.2 G/DL (ref 32–36)
MCV RBC AUTO: 98 FL (ref 82–98)
MONOCYTES # BLD AUTO: 1.1 K/UL (ref 0.3–1)
MONOCYTES NFR BLD: 9.4 % (ref 4–15)
NEUTROPHILS # BLD AUTO: 9.8 K/UL (ref 1.8–7.7)
NEUTROPHILS NFR BLD: 80.8 % (ref 38–73)
NRBC BLD-RTO: 0 /100 WBC
PLATELET # BLD AUTO: 198 K/UL (ref 150–450)
PMV BLD AUTO: 10.7 FL (ref 9.2–12.9)
POTASSIUM SERPL-SCNC: 3.4 MMOL/L (ref 3.5–5.1)
PROCALCITONIN SERPL IA-MCNC: 0.64 NG/ML (ref 0–0.5)
PROT SERPL-MCNC: 6.2 G/DL (ref 6–8.4)
RBC # BLD AUTO: 4.5 M/UL (ref 4–5.4)
SAMPLE: NORMAL
SAMPLE: NORMAL
SARS-COV-2 RDRP RESP QL NAA+PROBE: NEGATIVE
SODIUM SERPL-SCNC: 140 MMOL/L (ref 136–145)
SPECIMEN SOURCE: NORMAL
TROPONIN I SERPL HS-MCNC: 61.9 PG/ML (ref 0–14.9)
WBC # BLD AUTO: 12.1 K/UL (ref 3.9–12.7)

## 2024-09-19 PROCEDURE — 63600175 PHARM REV CODE 636 W HCPCS: Performed by: EMERGENCY MEDICINE

## 2024-09-19 PROCEDURE — 96365 THER/PROPH/DIAG IV INF INIT: CPT

## 2024-09-19 PROCEDURE — 87086 URINE CULTURE/COLONY COUNT: CPT | Performed by: EMERGENCY MEDICINE

## 2024-09-19 PROCEDURE — 84145 PROCALCITONIN (PCT): CPT | Performed by: EMERGENCY MEDICINE

## 2024-09-19 PROCEDURE — 99291 CRITICAL CARE FIRST HOUR: CPT

## 2024-09-19 PROCEDURE — 36415 COLL VENOUS BLD VENIPUNCTURE: CPT | Performed by: EMERGENCY MEDICINE

## 2024-09-19 PROCEDURE — 80053 COMPREHEN METABOLIC PANEL: CPT | Performed by: EMERGENCY MEDICINE

## 2024-09-19 PROCEDURE — 85025 COMPLETE CBC W/AUTO DIFF WBC: CPT | Performed by: EMERGENCY MEDICINE

## 2024-09-19 PROCEDURE — 93010 ELECTROCARDIOGRAM REPORT: CPT | Mod: ,,, | Performed by: GENERAL PRACTICE

## 2024-09-19 PROCEDURE — 94640 AIRWAY INHALATION TREATMENT: CPT

## 2024-09-19 PROCEDURE — 83735 ASSAY OF MAGNESIUM: CPT | Performed by: EMERGENCY MEDICINE

## 2024-09-19 PROCEDURE — 27000221 HC OXYGEN, UP TO 24 HOURS

## 2024-09-19 PROCEDURE — 96361 HYDRATE IV INFUSION ADD-ON: CPT

## 2024-09-19 PROCEDURE — 96375 TX/PRO/DX INJ NEW DRUG ADDON: CPT

## 2024-09-19 PROCEDURE — 83880 ASSAY OF NATRIURETIC PEPTIDE: CPT | Performed by: EMERGENCY MEDICINE

## 2024-09-19 PROCEDURE — 84484 ASSAY OF TROPONIN QUANT: CPT | Mod: 91 | Performed by: EMERGENCY MEDICINE

## 2024-09-19 PROCEDURE — 99406 BEHAV CHNG SMOKING 3-10 MIN: CPT

## 2024-09-19 PROCEDURE — 25000003 PHARM REV CODE 250: Performed by: EMERGENCY MEDICINE

## 2024-09-19 PROCEDURE — 87502 INFLUENZA DNA AMP PROBE: CPT | Performed by: EMERGENCY MEDICINE

## 2024-09-19 PROCEDURE — 94761 N-INVAS EAR/PLS OXIMETRY MLT: CPT

## 2024-09-19 PROCEDURE — 25500020 PHARM REV CODE 255: Performed by: EMERGENCY MEDICINE

## 2024-09-19 PROCEDURE — 25000242 PHARM REV CODE 250 ALT 637 W/ HCPCS: Performed by: EMERGENCY MEDICINE

## 2024-09-19 PROCEDURE — 93005 ELECTROCARDIOGRAM TRACING: CPT | Performed by: GENERAL PRACTICE

## 2024-09-19 PROCEDURE — U0002 COVID-19 LAB TEST NON-CDC: HCPCS | Performed by: EMERGENCY MEDICINE

## 2024-09-19 PROCEDURE — 87040 BLOOD CULTURE FOR BACTERIA: CPT | Performed by: EMERGENCY MEDICINE

## 2024-09-19 PROCEDURE — 94799 UNLISTED PULMONARY SVC/PX: CPT

## 2024-09-19 PROCEDURE — 99900031 HC PATIENT EDUCATION (STAT)

## 2024-09-19 PROCEDURE — 51701 INSERT BLADDER CATHETER: CPT

## 2024-09-19 PROCEDURE — 81001 URINALYSIS AUTO W/SCOPE: CPT | Performed by: EMERGENCY MEDICINE

## 2024-09-19 PROCEDURE — 99900035 HC TECH TIME PER 15 MIN (STAT)

## 2024-09-19 RX ORDER — ACETAMINOPHEN 500 MG
1000 TABLET ORAL
Status: COMPLETED | OUTPATIENT
Start: 2024-09-19 | End: 2024-09-19

## 2024-09-19 RX ORDER — DAPAGLIFLOZIN 10 MG/1
1 TABLET, FILM COATED ORAL DAILY
COMMUNITY

## 2024-09-19 RX ORDER — IPRATROPIUM BROMIDE AND ALBUTEROL SULFATE 2.5; .5 MG/3ML; MG/3ML
3 SOLUTION RESPIRATORY (INHALATION) ONCE
Status: COMPLETED | OUTPATIENT
Start: 2024-09-19 | End: 2024-09-19

## 2024-09-19 RX ORDER — LABETALOL HYDROCHLORIDE 5 MG/ML
10 INJECTION, SOLUTION INTRAVENOUS
Status: COMPLETED | OUTPATIENT
Start: 2024-09-19 | End: 2024-09-19

## 2024-09-19 RX ADMIN — IOHEXOL 100 ML: 350 INJECTION, SOLUTION INTRAVENOUS at 10:09

## 2024-09-19 RX ADMIN — IPRATROPIUM BROMIDE AND ALBUTEROL SULFATE 3 ML: 2.5; .5 SOLUTION RESPIRATORY (INHALATION) at 09:09

## 2024-09-19 RX ADMIN — PIPERACILLIN SODIUM AND TAZOBACTAM SODIUM 4.5 G: 4; .5 INJECTION, POWDER, LYOPHILIZED, FOR SOLUTION INTRAVENOUS at 06:09

## 2024-09-19 RX ADMIN — SODIUM CHLORIDE 1000 ML: 9 INJECTION, SOLUTION INTRAVENOUS at 07:09

## 2024-09-19 RX ADMIN — ACETAMINOPHEN 1000 MG: 500 TABLET, FILM COATED ORAL at 05:09

## 2024-09-19 RX ADMIN — LABETALOL HYDROCHLORIDE 10 MG: 5 INJECTION, SOLUTION INTRAVENOUS at 06:09

## 2024-09-19 NOTE — Clinical Note
Diagnosis: Sepsis [160807]   Future Attending Provider: ANGE LAIRD [[id number]]   Place in Observation: [facility] [[id number]]   Special Needs:: No Special Needs [1]

## 2024-09-20 ENCOUNTER — CLINICAL SUPPORT (OUTPATIENT)
Dept: CARDIOLOGY | Facility: HOSPITAL | Age: 84
End: 2024-09-20
Attending: INTERNAL MEDICINE
Payer: MEDICARE

## 2024-09-20 VITALS — HEIGHT: 68 IN | BODY MASS INDEX: 39.4 KG/M2 | WEIGHT: 260 LBS

## 2024-09-20 PROBLEM — J44.1 COPD EXACERBATION: Status: ACTIVE | Noted: 2024-09-20

## 2024-09-20 PROBLEM — R53.1 WEAKNESS: Status: ACTIVE | Noted: 2024-09-20

## 2024-09-20 PROBLEM — R79.89 ELEVATED TROPONIN: Status: ACTIVE | Noted: 2024-09-20

## 2024-09-20 PROBLEM — F17.200 TOBACCO DEPENDENCY: Status: ACTIVE | Noted: 2024-09-20

## 2024-09-20 LAB
ADENOVIRUS: NOT DETECTED
ALBUMIN SERPL BCP-MCNC: 3 G/DL (ref 3.5–5.2)
ALP SERPL-CCNC: 49 U/L (ref 55–135)
ALT SERPL W/O P-5'-P-CCNC: 7 U/L (ref 10–44)
ANION GAP SERPL CALC-SCNC: 10 MMOL/L (ref 8–16)
AORTIC ROOT ANNULUS: 3.1 CM
AORTIC VALVE CUSP SEPERATION: 1.2 CM
APICAL FOUR CHAMBER EJECTION FRACTION: 53 %
APICAL TWO CHAMBER EJECTION FRACTION: 57 %
ASCENDING AORTA: 2.5 CM
AST SERPL-CCNC: 9 U/L (ref 10–40)
AV INDEX (PROSTH): 0.52
AV MEAN GRADIENT: 9 MMHG
AV PEAK GRADIENT: 16 MMHG
AV VALVE AREA BY VELOCITY RATIO: 1.62 CM²
AV VALVE AREA: 1.62 CM²
AV VELOCITY RATIO: 0.52
BACTERIA #/AREA URNS HPF: ABNORMAL /HPF
BILIRUB SERPL-MCNC: 0.6 MG/DL (ref 0.1–1)
BILIRUB UR QL STRIP: NEGATIVE
BORDETELLA PARAPERTUSSIS (IS1001): NOT DETECTED
BORDETELLA PERTUSSIS (PTXP): NOT DETECTED
BSA FOR ECHO PROCEDURE: 2.38 M2
BUN SERPL-MCNC: 19 MG/DL (ref 8–23)
CALCIUM SERPL-MCNC: 8.6 MG/DL (ref 8.7–10.5)
CHLAMYDIA PNEUMONIAE: NOT DETECTED
CHLORIDE SERPL-SCNC: 109 MMOL/L (ref 95–110)
CLARITY UR: CLEAR
CO2 SERPL-SCNC: 24 MMOL/L (ref 23–29)
COLOR UR: YELLOW
CORONAVIRUS 229E, COMMON COLD VIRUS: NOT DETECTED
CORONAVIRUS HKU1, COMMON COLD VIRUS: NOT DETECTED
CORONAVIRUS NL63, COMMON COLD VIRUS: NOT DETECTED
CORONAVIRUS OC43, COMMON COLD VIRUS: NOT DETECTED
CREAT SERPL-MCNC: 1.4 MG/DL (ref 0.5–1.4)
CV ECHO LV RWT: 0.41 CM
DOP CALC AO PEAK VEL: 2.01 M/S
DOP CALC AO VTI: 34.5 CM
DOP CALC LVOT AREA: 3.1 CM2
DOP CALC LVOT DIAMETER: 2 CM
DOP CALC LVOT PEAK VEL: 1.04 M/S
DOP CALC LVOT STROKE VOLUME: 55.89 CM3
DOP CALC MV VTI: 26.3 CM
DOP CALCLVOT PEAK VEL VTI: 17.8 CM
E WAVE DECELERATION TIME: 158 MSEC
E/A RATIO: 0.71
E/E' RATIO: 12.4 M/S
ECHO LV POSTERIOR WALL: 1 CM (ref 0.6–1.1)
EST. GFR  (NO RACE VARIABLE): 37.6 ML/MIN/1.73 M^2
ESTIMATED AVG GLUCOSE: 180 MG/DL (ref 68–131)
FLUBV RNA NPH QL NAA+NON-PROBE: NOT DETECTED
FRACTIONAL SHORTENING: 30 % (ref 28–44)
GLUCOSE SERPL-MCNC: 162 MG/DL (ref 70–110)
GLUCOSE UR QL STRIP: ABNORMAL
HBA1C MFR BLD: 7.9 % (ref 4.5–6.2)
HGB UR QL STRIP: ABNORMAL
HPIV1 RNA NPH QL NAA+NON-PROBE: NOT DETECTED
HPIV2 RNA NPH QL NAA+NON-PROBE: NOT DETECTED
HPIV3 RNA NPH QL NAA+NON-PROBE: NOT DETECTED
HPIV4 RNA NPH QL NAA+NON-PROBE: NOT DETECTED
HUMAN METAPNEUMOVIRUS: NOT DETECTED
HYALINE CASTS #/AREA URNS LPF: 0 /LPF
INFLUENZA A (SUBTYPES H1,H1-2009,H3): NOT DETECTED
INTERVENTRICULAR SEPTUM: 1.29 CM (ref 0.6–1.1)
KETONES UR QL STRIP: NEGATIVE
LEFT ATRIUM AREA SYSTOLIC (APICAL 2 CHAMBER): 16.2 CM2
LEFT ATRIUM AREA SYSTOLIC (APICAL 4 CHAMBER): 15.5 CM2
LEFT ATRIUM SIZE: 3.6 CM
LEFT ATRIUM VOLUME INDEX MOD: 18 ML/M2
LEFT ATRIUM VOLUME MOD: 41.2 CM3
LEFT INTERNAL DIMENSION IN SYSTOLE: 3.39 CM (ref 2.1–4)
LEFT VENTRICLE DIASTOLIC VOLUME INDEX: 47.64 ML/M2
LEFT VENTRICLE DIASTOLIC VOLUME: 109.09 ML
LEFT VENTRICLE END DIASTOLIC VOLUME APICAL 2 CHAMBER: 83.3 ML
LEFT VENTRICLE END DIASTOLIC VOLUME APICAL 4 CHAMBER: 82.5 ML
LEFT VENTRICLE END SYSTOLIC VOLUME APICAL 2 CHAMBER: 43.7 ML
LEFT VENTRICLE END SYSTOLIC VOLUME APICAL 4 CHAMBER: 39 ML
LEFT VENTRICLE MASS INDEX: 90 G/M2
LEFT VENTRICLE SYSTOLIC VOLUME INDEX: 20.6 ML/M2
LEFT VENTRICLE SYSTOLIC VOLUME: 47.1 ML
LEFT VENTRICULAR INTERNAL DIMENSION IN DIASTOLE: 4.83 CM (ref 3.5–6)
LEFT VENTRICULAR MASS: 207.16 G
LEUKOCYTE ESTERASE UR QL STRIP: ABNORMAL
LV LATERAL E/E' RATIO: 10.33 M/S
LV SEPTAL E/E' RATIO: 15.5 M/S
LVED V (TEICH): 109.09 ML
LVES V (TEICH): 47.1 ML
LVOT MG: 2 MMHG
LVOT MV: 0.71 CM/S
MAGNESIUM SERPL-MCNC: 1.8 MG/DL (ref 1.6–2.6)
MICROSCOPIC COMMENT: ABNORMAL
MV MEAN GRADIENT: 4 MMHG
MV PEAK A VEL: 1.31 M/S
MV PEAK E VEL: 0.93 M/S
MV PEAK GRADIENT: 9 MMHG
MV STENOSIS PRESSURE HALF TIME: 72 MS
MV VALVE AREA BY CONTINUITY EQUATION: 2.13 CM2
MV VALVE AREA P 1/2 METHOD: 3.06 CM2
MYCOPLASMA PNEUMONIAE: NOT DETECTED
NITRITE UR QL STRIP: NEGATIVE
OHS LV EJECTION FRACTION SIMPSONS BIPLANE MOD: 56 %
PH UR STRIP: 6 [PH] (ref 5–8)
PISA TR MAX VEL: 1.97 M/S
POCT GLUCOSE: 189 MG/DL (ref 70–110)
POCT GLUCOSE: 293 MG/DL (ref 70–110)
POCT GLUCOSE: 352 MG/DL (ref 70–110)
POTASSIUM SERPL-SCNC: 3.4 MMOL/L (ref 3.5–5.1)
PROT SERPL-MCNC: 5.6 G/DL (ref 6–8.4)
PROT UR QL STRIP: ABNORMAL
PV MV: 1 M/S
PV PEAK GRADIENT: 8 MMHG
PV PEAK VELOCITY: 1.44 M/S
RA PRESSURE ESTIMATED: 3 MMHG
RBC #/AREA URNS HPF: 10 /HPF (ref 0–4)
RESPIRATORY INFECTION PANEL SOURCE: NORMAL
RIGHT ATRIUM VOLUME AREA LENGTH APICAL 4 CHAMBER: 30.1 ML
RSV RNA NPH QL NAA+NON-PROBE: NOT DETECTED
RV TB RVSP: 5 MMHG
RV TISSUE DOPPLER FREE WALL SYSTOLIC VELOCITY 1 (APICAL 4 CHAMBER VIEW): 12.6 CM/S
RV+EV RNA NPH QL NAA+NON-PROBE: NOT DETECTED
SARS-COV-2 RNA RESP QL NAA+PROBE: NOT DETECTED
SINUS: 3.17 CM
SODIUM SERPL-SCNC: 143 MMOL/L (ref 136–145)
SP GR UR STRIP: >1.03 (ref 1–1.03)
SQUAMOUS #/AREA URNS HPF: 1 /HPF
STJ: 2.75 CM
T4 FREE SERPL-MCNC: 0.88 NG/DL (ref 0.71–1.51)
TDI LATERAL: 0.09 M/S
TDI SEPTAL: 0.06 M/S
TDI: 0.08 M/S
TR MAX PG: 16 MMHG
TRICUSPID ANNULAR PLANE SYSTOLIC EXCURSION: 1.94 CM
TROPONIN I SERPL HS-MCNC: 37.7 PG/ML (ref 0–14.9)
TROPONIN I SERPL HS-MCNC: 69.3 PG/ML (ref 0–14.9)
TROPONIN I SERPL HS-MCNC: 81.8 PG/ML (ref 0–14.9)
TSH SERPL DL<=0.005 MIU/L-ACNC: 1.2 UIU/ML (ref 0.34–5.6)
TV REST PULMONARY ARTERY PRESSURE: 19 MMHG
URN SPEC COLLECT METH UR: ABNORMAL
UROBILINOGEN UR STRIP-ACNC: NEGATIVE EU/DL
WBC #/AREA URNS HPF: 11 /HPF (ref 0–5)
YEAST URNS QL MICRO: ABNORMAL
Z-SCORE OF LEFT VENTRICULAR DIMENSION IN END DIASTOLE: -5.94
Z-SCORE OF LEFT VENTRICULAR DIMENSION IN END SYSTOLE: -3.51

## 2024-09-20 PROCEDURE — 80053 COMPREHEN METABOLIC PANEL: CPT | Performed by: INTERNAL MEDICINE

## 2024-09-20 PROCEDURE — 99900035 HC TECH TIME PER 15 MIN (STAT)

## 2024-09-20 PROCEDURE — 96367 TX/PROPH/DG ADDL SEQ IV INF: CPT

## 2024-09-20 PROCEDURE — 99900031 HC PATIENT EDUCATION (STAT)

## 2024-09-20 PROCEDURE — 82962 GLUCOSE BLOOD TEST: CPT

## 2024-09-20 PROCEDURE — 94640 AIRWAY INHALATION TREATMENT: CPT

## 2024-09-20 PROCEDURE — 94761 N-INVAS EAR/PLS OXIMETRY MLT: CPT

## 2024-09-20 PROCEDURE — 25000003 PHARM REV CODE 250: Performed by: INTERNAL MEDICINE

## 2024-09-20 PROCEDURE — 87798 DETECT AGENT NOS DNA AMP: CPT | Performed by: NURSE PRACTITIONER

## 2024-09-20 PROCEDURE — 25000242 PHARM REV CODE 250 ALT 637 W/ HCPCS: Performed by: NURSE PRACTITIONER

## 2024-09-20 PROCEDURE — 96376 TX/PRO/DX INJ SAME DRUG ADON: CPT

## 2024-09-20 PROCEDURE — 93005 ELECTROCARDIOGRAM TRACING: CPT | Performed by: GENERAL PRACTICE

## 2024-09-20 PROCEDURE — 93306 TTE W/DOPPLER COMPLETE: CPT | Mod: 26,,, | Performed by: INTERNAL MEDICINE

## 2024-09-20 PROCEDURE — 83735 ASSAY OF MAGNESIUM: CPT | Performed by: INTERNAL MEDICINE

## 2024-09-20 PROCEDURE — 36415 COLL VENOUS BLD VENIPUNCTURE: CPT | Performed by: NURSE PRACTITIONER

## 2024-09-20 PROCEDURE — 96361 HYDRATE IV INFUSION ADD-ON: CPT

## 2024-09-20 PROCEDURE — 25000242 PHARM REV CODE 250 ALT 637 W/ HCPCS: Performed by: INTERNAL MEDICINE

## 2024-09-20 PROCEDURE — 21400001 HC TELEMETRY ROOM

## 2024-09-20 PROCEDURE — 83036 HEMOGLOBIN GLYCOSYLATED A1C: CPT | Mod: 91 | Performed by: INTERNAL MEDICINE

## 2024-09-20 PROCEDURE — 84439 ASSAY OF FREE THYROXINE: CPT | Performed by: INTERNAL MEDICINE

## 2024-09-20 PROCEDURE — 36415 COLL VENOUS BLD VENIPUNCTURE: CPT | Performed by: INTERNAL MEDICINE

## 2024-09-20 PROCEDURE — 96365 THER/PROPH/DIAG IV INF INIT: CPT | Mod: 59

## 2024-09-20 PROCEDURE — 99223 1ST HOSP IP/OBS HIGH 75: CPT | Mod: ,,, | Performed by: INTERNAL MEDICINE

## 2024-09-20 PROCEDURE — 84484 ASSAY OF TROPONIN QUANT: CPT | Performed by: INTERNAL MEDICINE

## 2024-09-20 PROCEDURE — 93306 TTE W/DOPPLER COMPLETE: CPT

## 2024-09-20 PROCEDURE — 63600175 PHARM REV CODE 636 W HCPCS: Performed by: INTERNAL MEDICINE

## 2024-09-20 PROCEDURE — 84484 ASSAY OF TROPONIN QUANT: CPT | Mod: 91 | Performed by: NURSE PRACTITIONER

## 2024-09-20 PROCEDURE — S4991 NICOTINE PATCH NONLEGEND: HCPCS | Performed by: INTERNAL MEDICINE

## 2024-09-20 PROCEDURE — 84443 ASSAY THYROID STIM HORMONE: CPT | Performed by: INTERNAL MEDICINE

## 2024-09-20 PROCEDURE — 93010 ELECTROCARDIOGRAM REPORT: CPT | Mod: ,,, | Performed by: GENERAL PRACTICE

## 2024-09-20 PROCEDURE — 96375 TX/PRO/DX INJ NEW DRUG ADDON: CPT

## 2024-09-20 PROCEDURE — 25000003 PHARM REV CODE 250: Performed by: NURSE PRACTITIONER

## 2024-09-20 PROCEDURE — 87486 CHLMYD PNEUM DNA AMP PROBE: CPT | Performed by: NURSE PRACTITIONER

## 2024-09-20 PROCEDURE — 27000221 HC OXYGEN, UP TO 24 HOURS

## 2024-09-20 RX ORDER — ENOXAPARIN SODIUM 150 MG/ML
1 INJECTION SUBCUTANEOUS EVERY 12 HOURS
Status: DISCONTINUED | OUTPATIENT
Start: 2024-09-20 | End: 2024-09-21

## 2024-09-20 RX ORDER — METOPROLOL TARTRATE 25 MG/1
25 TABLET, FILM COATED ORAL 2 TIMES DAILY
Status: DISCONTINUED | OUTPATIENT
Start: 2024-09-20 | End: 2024-09-21

## 2024-09-20 RX ORDER — SODIUM,POTASSIUM PHOSPHATES 280-250MG
2 POWDER IN PACKET (EA) ORAL
Status: DISCONTINUED | OUTPATIENT
Start: 2024-09-20 | End: 2024-09-22 | Stop reason: HOSPADM

## 2024-09-20 RX ORDER — INSULIN ASPART 100 [IU]/ML
0-10 INJECTION, SOLUTION INTRAVENOUS; SUBCUTANEOUS EVERY 6 HOURS PRN
Status: DISCONTINUED | OUTPATIENT
Start: 2024-09-20 | End: 2024-09-22 | Stop reason: HOSPADM

## 2024-09-20 RX ORDER — LANOLIN ALCOHOL/MO/W.PET/CERES
800 CREAM (GRAM) TOPICAL
Status: DISCONTINUED | OUTPATIENT
Start: 2024-09-20 | End: 2024-09-22 | Stop reason: HOSPADM

## 2024-09-20 RX ORDER — ALUMINUM HYDROXIDE, MAGNESIUM HYDROXIDE, AND SIMETHICONE 1200; 120; 1200 MG/30ML; MG/30ML; MG/30ML
30 SUSPENSION ORAL 4 TIMES DAILY PRN
Status: DISCONTINUED | OUTPATIENT
Start: 2024-09-20 | End: 2024-09-22 | Stop reason: HOSPADM

## 2024-09-20 RX ORDER — GLUCAGON 1 MG
1 KIT INJECTION
Status: DISCONTINUED | OUTPATIENT
Start: 2024-09-20 | End: 2024-09-22 | Stop reason: HOSPADM

## 2024-09-20 RX ORDER — INSULIN GLARGINE 100 [IU]/ML
30 INJECTION, SOLUTION SUBCUTANEOUS DAILY
Status: DISCONTINUED | OUTPATIENT
Start: 2024-09-20 | End: 2024-09-22 | Stop reason: HOSPADM

## 2024-09-20 RX ORDER — NALOXONE HCL 0.4 MG/ML
0.02 VIAL (ML) INJECTION
Status: DISCONTINUED | OUTPATIENT
Start: 2024-09-20 | End: 2024-09-22 | Stop reason: HOSPADM

## 2024-09-20 RX ORDER — NITROGLYCERIN 0.4 MG/1
0.4 TABLET SUBLINGUAL EVERY 5 MIN PRN
Status: DISCONTINUED | OUTPATIENT
Start: 2024-09-20 | End: 2024-09-22 | Stop reason: HOSPADM

## 2024-09-20 RX ORDER — GUAIFENESIN 100 MG/5ML
400 SOLUTION ORAL 3 TIMES DAILY
Status: DISCONTINUED | OUTPATIENT
Start: 2024-09-20 | End: 2024-09-22 | Stop reason: HOSPADM

## 2024-09-20 RX ORDER — IBUPROFEN 200 MG
24 TABLET ORAL
Status: DISCONTINUED | OUTPATIENT
Start: 2024-09-20 | End: 2024-09-22 | Stop reason: HOSPADM

## 2024-09-20 RX ORDER — ACETAMINOPHEN 325 MG/1
650 TABLET ORAL EVERY 8 HOURS PRN
Status: DISCONTINUED | OUTPATIENT
Start: 2024-09-20 | End: 2024-09-22 | Stop reason: HOSPADM

## 2024-09-20 RX ORDER — ONDANSETRON HYDROCHLORIDE 2 MG/ML
4 INJECTION, SOLUTION INTRAVENOUS EVERY 6 HOURS PRN
Status: DISCONTINUED | OUTPATIENT
Start: 2024-09-20 | End: 2024-09-22 | Stop reason: HOSPADM

## 2024-09-20 RX ORDER — FAMOTIDINE 10 MG/ML
20 INJECTION INTRAVENOUS 2 TIMES DAILY
Status: DISCONTINUED | OUTPATIENT
Start: 2024-09-20 | End: 2024-09-20

## 2024-09-20 RX ORDER — IBUPROFEN 200 MG
1 TABLET ORAL DAILY
Status: DISCONTINUED | OUTPATIENT
Start: 2024-09-20 | End: 2024-09-22 | Stop reason: HOSPADM

## 2024-09-20 RX ORDER — ACETAMINOPHEN 325 MG/1
650 TABLET ORAL EVERY 4 HOURS PRN
Status: DISCONTINUED | OUTPATIENT
Start: 2024-09-20 | End: 2024-09-22 | Stop reason: HOSPADM

## 2024-09-20 RX ORDER — ENOXAPARIN SODIUM 100 MG/ML
40 INJECTION SUBCUTANEOUS EVERY 24 HOURS
Status: DISCONTINUED | OUTPATIENT
Start: 2024-09-20 | End: 2024-09-20

## 2024-09-20 RX ORDER — FAMOTIDINE 10 MG/ML
20 INJECTION INTRAVENOUS DAILY
Status: DISCONTINUED | OUTPATIENT
Start: 2024-09-20 | End: 2024-09-22 | Stop reason: HOSPADM

## 2024-09-20 RX ORDER — IPRATROPIUM BROMIDE AND ALBUTEROL SULFATE 2.5; .5 MG/3ML; MG/3ML
3 SOLUTION RESPIRATORY (INHALATION)
Status: DISCONTINUED | OUTPATIENT
Start: 2024-09-20 | End: 2024-09-22 | Stop reason: HOSPADM

## 2024-09-20 RX ORDER — ASPIRIN 81 MG/1
162 TABLET ORAL DAILY
Status: DISCONTINUED | OUTPATIENT
Start: 2024-09-20 | End: 2024-09-22 | Stop reason: HOSPADM

## 2024-09-20 RX ORDER — LISINOPRIL 20 MG/1
20 TABLET ORAL DAILY
Status: DISCONTINUED | OUTPATIENT
Start: 2024-09-20 | End: 2024-09-22 | Stop reason: HOSPADM

## 2024-09-20 RX ORDER — IBUPROFEN 200 MG
16 TABLET ORAL
Status: DISCONTINUED | OUTPATIENT
Start: 2024-09-20 | End: 2024-09-22 | Stop reason: HOSPADM

## 2024-09-20 RX ORDER — IPRATROPIUM BROMIDE AND ALBUTEROL SULFATE 2.5; .5 MG/3ML; MG/3ML
3 SOLUTION RESPIRATORY (INHALATION) 4 TIMES DAILY
Status: DISCONTINUED | OUTPATIENT
Start: 2024-09-20 | End: 2024-09-20

## 2024-09-20 RX ORDER — IPRATROPIUM BROMIDE AND ALBUTEROL SULFATE 2.5; .5 MG/3ML; MG/3ML
3 SOLUTION RESPIRATORY (INHALATION) EVERY 6 HOURS PRN
Status: DISCONTINUED | OUTPATIENT
Start: 2024-09-20 | End: 2024-09-22 | Stop reason: HOSPADM

## 2024-09-20 RX ORDER — TALC
6 POWDER (GRAM) TOPICAL NIGHTLY PRN
Status: DISCONTINUED | OUTPATIENT
Start: 2024-09-20 | End: 2024-09-22 | Stop reason: HOSPADM

## 2024-09-20 RX ORDER — AMOXICILLIN 250 MG
2 CAPSULE ORAL 2 TIMES DAILY
Status: DISCONTINUED | OUTPATIENT
Start: 2024-09-20 | End: 2024-09-22 | Stop reason: HOSPADM

## 2024-09-20 RX ORDER — SODIUM CHLORIDE 0.9 % (FLUSH) 0.9 %
10 SYRINGE (ML) INJECTION EVERY 12 HOURS PRN
Status: DISCONTINUED | OUTPATIENT
Start: 2024-09-20 | End: 2024-09-22 | Stop reason: HOSPADM

## 2024-09-20 RX ORDER — GLUCAGON 1 MG
1 KIT INJECTION
Status: DISCONTINUED | OUTPATIENT
Start: 2024-09-20 | End: 2024-09-20

## 2024-09-20 RX ADMIN — POTASSIUM CHLORIDE AND SODIUM CHLORIDE 100 ML/HR: 450; 150 INJECTION, SOLUTION INTRAVENOUS at 11:09

## 2024-09-20 RX ADMIN — METOPROLOL TARTRATE 25 MG: 25 TABLET, FILM COATED ORAL at 10:09

## 2024-09-20 RX ADMIN — GUAIFENESIN 400 MG: 200 SOLUTION ORAL at 09:09

## 2024-09-20 RX ADMIN — METOPROLOL TARTRATE 25 MG: 25 TABLET, FILM COATED ORAL at 09:09

## 2024-09-20 RX ADMIN — IPRATROPIUM BROMIDE AND ALBUTEROL SULFATE 3 ML: 2.5; .5 SOLUTION RESPIRATORY (INHALATION) at 09:09

## 2024-09-20 RX ADMIN — METHYLPREDNISOLONE SODIUM SUCCINATE 40 MG: 40 INJECTION, POWDER, FOR SOLUTION INTRAMUSCULAR; INTRAVENOUS at 09:09

## 2024-09-20 RX ADMIN — SENNOSIDES AND DOCUSATE SODIUM 2 TABLET: 8.6; 5 TABLET ORAL at 10:09

## 2024-09-20 RX ADMIN — METHYLPREDNISOLONE SODIUM SUCCINATE 40 MG: 40 INJECTION, POWDER, FOR SOLUTION INTRAMUSCULAR; INTRAVENOUS at 06:09

## 2024-09-20 RX ADMIN — INSULIN GLARGINE 30 UNITS: 100 INJECTION, SOLUTION SUBCUTANEOUS at 11:09

## 2024-09-20 RX ADMIN — LISINOPRIL 20 MG: 20 TABLET ORAL at 04:09

## 2024-09-20 RX ADMIN — ASPIRIN 162 MG: 81 TABLET, COATED ORAL at 10:09

## 2024-09-20 RX ADMIN — CEFTRIAXONE SODIUM 2 G: 2 INJECTION, POWDER, FOR SOLUTION INTRAMUSCULAR; INTRAVENOUS at 06:09

## 2024-09-20 RX ADMIN — IPRATROPIUM BROMIDE AND ALBUTEROL SULFATE 3 ML: 2.5; .5 SOLUTION RESPIRATORY (INHALATION) at 07:09

## 2024-09-20 RX ADMIN — METHYLPREDNISOLONE SODIUM SUCCINATE 40 MG: 40 INJECTION, POWDER, FOR SOLUTION INTRAMUSCULAR; INTRAVENOUS at 02:09

## 2024-09-20 RX ADMIN — NICOTINE 1 PATCH: 21 PATCH, EXTENDED RELEASE TRANSDERMAL at 06:09

## 2024-09-20 RX ADMIN — ALUMINUM HYDROXIDE, MAGNESIUM HYDROXIDE, AND SIMETHICONE 30 ML: 1200; 120; 1200 SUSPENSION ORAL at 02:09

## 2024-09-20 RX ADMIN — ENOXAPARIN SODIUM 120 MG: 120 INJECTION SUBCUTANEOUS at 07:09

## 2024-09-20 RX ADMIN — FAMOTIDINE 20 MG: 10 INJECTION INTRAVENOUS at 10:09

## 2024-09-20 RX ADMIN — INSULIN ASPART 10 UNITS: 100 INJECTION, SOLUTION INTRAVENOUS; SUBCUTANEOUS at 06:09

## 2024-09-20 RX ADMIN — NITROGLYCERIN 0.4 MG: 0.4 TABLET, ORALLY DISINTEGRATING SUBLINGUAL at 04:09

## 2024-09-20 RX ADMIN — SENNOSIDES AND DOCUSATE SODIUM 2 TABLET: 8.6; 5 TABLET ORAL at 09:09

## 2024-09-20 RX ADMIN — GUAIFENESIN 400 MG: 200 SOLUTION ORAL at 10:09

## 2024-09-20 RX ADMIN — AZITHROMYCIN 500 MG: 500 INJECTION, POWDER, LYOPHILIZED, FOR SOLUTION INTRAVENOUS at 10:09

## 2024-09-20 RX ADMIN — IPRATROPIUM BROMIDE AND ALBUTEROL SULFATE 3 ML: 2.5; .5 SOLUTION RESPIRATORY (INHALATION) at 01:09

## 2024-09-20 NOTE — ED PROVIDER NOTES
"Encounter Date: 9/19/2024       History     Chief Complaint   Patient presents with    generalized weakness     Pt arrived via EMS from home complaining of generalized illness. Pt is febrile with a cough x "few days"     HPI  Pt w/ PMHx DM, HLD, COPD, HTN, CKD, dementia, TIA, PVD presents to ED with cough for a couple of days with development of fever today per family.  Patient has seemed a little more confused to family and generally weak.  She has also complained of some shortness of breath with exertion.  Denies any chest pain, abdominal pain, nausea or vomiting, bowel changes or dysuria.  Did recently travel with family out of state and was exposed to persons that later tested positive for COVID-19.    Review of patient's allergies indicates:   Allergen Reactions    Latex Rash    Tetracyclines Swelling     Past Medical History:   Diagnosis Date    Allergy     Anticoagulant long-term use     Diabetes mellitus     Diabetes mellitus, type 2     Disorder of kidney and ureter     Hyperlipidemia     Hypertension     Obesity     Peripheral vascular disease     Thyroid disease     Type 2 diabetes mellitus      Past Surgical History:   Procedure Laterality Date    ANGIOGRAM, EXTREMITY, UNILATERAL Right 9/6/2023    Procedure: ANGIOGRAM, EXTREMITY, UNILATERAL;  Surgeon: Cruz Armas MD;  Location: [facility] OR;  Service: Vascular;  Laterality: Right;    ANGIOGRAPHY OF LOWER EXTREMITY N/A 12/11/2019    Procedure: Angiogram Extremity Unilateral;  Surgeon: Cruz Armas MD;  Location: [facility] CATH/EP LAB;  Service: Cardiology;  Laterality: N/A;    ANGIOGRAPHY OF LOWER EXTREMITY Right 9/6/2023    Procedure: Angiogram Extremity Unilateral;  Surgeon: Cruz Armas MD;  Location: [facility] CATH/EP LAB;  Service: General;  Laterality: Right;    ANKLE HARDWARE REMOVAL Right 12/15/2022    Procedure: REMOVAL, HARDWARE, ANKLE;  Surgeon: Nae Jerry MD;  Location: [facility] OR;  Service: Orthopedics;  Laterality: Right;    CORONARY " ARTERY BYPASS GRAFT      EMBOLECTOMY, LOWER EXTREMITY Right 9/6/2023    Procedure: EMBOLECTOMY, LOWER EXTREMITY;  Surgeon: Cruz Armas MD;  Location: [facility] OR;  Service: Vascular;  Laterality: Right;    HYSTERECTOMY      partial    IRRIGATION AND DEBRIDEMENT OF LOWER EXTREMITY Right 12/15/2022    Procedure: IRRIGATION AND DEBRIDEMENT, LOWER EXTREMITY;  Surgeon: Nae Jerry MD;  Location: [facility] OR;  Service: Orthopedics;  Laterality: Right;    OPEN REDUCTION AND INTERNAL FIXATION (ORIF) OF INJURY OF ANKLE Right 7/20/2022    Procedure: ORIF, ANKLE- medial mallelous;  Surgeon: Nae Jerry MD;  Location: [facility] OR;  Service: Orthopedics;  Laterality: Right;    PERCUTANEOUS TRANSLUMINAL ANGIOPLASTY N/A 9/6/2023    Procedure: PTA (ANGIOPLASTY, PERCUTANEOUS, TRANSLUMINAL);  Surgeon: Cruz Armas MD;  Location: [facility] CATH/EP LAB;  Service: General;  Laterality: N/A;    PERIPHERAL ANGIOGRAPHY  12/11/2019     Family History   Problem Relation Name Age of Onset    Alzheimer's disease Mother      Heart disease Father          MI    No Known Problems Sister      Diabetes Brother      No Known Problems Daughter      No Known Problems Son      No Known Problems Sister      No Known Problems Sister      No Known Problems Sister      No Known Problems Sister      Heart disease Brother      No Known Problems Brother      No Known Problems Son      No Known Problems Son       Social History     Tobacco Use    Smoking status: Every Day     Current packs/day: 0.50     Average packs/day: 0.5 packs/day for 65.0 years (32.5 ttl pk-yrs)     Types: Cigarettes    Smokeless tobacco: Never    Tobacco comments:     Pt has a 65 year smoking history. Pt doesn't know at this time how much she smokes, it varies day to day. Currently has a patch on. [employer] member: [phone]    Substance Use Topics    Alcohol use: No    Drug use: No     Review of Systems   Constitutional:  Positive for activity change, fatigue and fever.   HENT:   Negative for sore throat.    Respiratory:  Positive for cough and shortness of breath.    Cardiovascular:  Negative for chest pain.   Gastrointestinal:  Negative for nausea.   Genitourinary:  Negative for dysuria.   Musculoskeletal:  Negative for back pain.   Skin:  Negative for rash.   Neurological:  Negative for weakness.   Hematological:  Does not bruise/bleed easily.   Psychiatric/Behavioral:  Positive for confusion.        Physical Exam     Initial Vitals [09/19/24 1703]   BP Pulse Resp Temp SpO2   (!) 184/79 98 (!) 23 (!) 102.1 °F (38.9 °C) (!) 94 %      MAP       --         Physical Exam    Nursing note and vitals reviewed.  Constitutional: She appears well-developed. No distress.   Obese   HENT:   Head: Normocephalic and atraumatic.   Mouth/Throat: Oropharynx is clear and moist.   Eyes: EOM are normal. Pupils are equal, round, and reactive to light.   Neck: Neck supple.   Normal range of motion.  Cardiovascular:  Normal rate and regular rhythm.           Pulmonary/Chest: No respiratory distress.   Bibasilar end-expiratory wheezing   Abdominal: Abdomen is soft. There is no abdominal tenderness. There is no rebound and no guarding.   Musculoskeletal:         General: No tenderness or edema. Normal range of motion.      Cervical back: Normal range of motion and neck supple.     Neurological: She is alert and oriented to person, place, and time. She has normal strength. No cranial nerve deficit. GCS eye subscore is 4. GCS verbal subscore is 5. GCS motor subscore is 6.   Has some confusion to recent events   Skin: Skin is warm and dry. Capillary refill takes less than 2 seconds. No rash noted.   Psychiatric: She has a normal mood and affect. Thought content normal.         ED Course   Procedures  Labs Reviewed   CBC W/ AUTO DIFFERENTIAL - Abnormal       Result Value    WBC 12.10      RBC 4.50      Hemoglobin 14.6      Hematocrit 44.0      MCV 98      MCH 32.4 (*)     MCHC 33.2      RDW 13.3      Platelets 198       MPV 10.7      Immature Granulocytes 0.6 (*)     Gran # (ANC) 9.8 (*)     Immature Grans (Abs) 0.07 (*)     Lymph # 1.1      Mono # 1.1 (*)     Eos # 0.0      Baso # 0.03      nRBC 0      Gran % 80.8 (*)     Lymph % 8.8 (*)     Mono % 9.4      Eosinophil % 0.2      Basophil % 0.2      Differential Method Automated     COMPREHENSIVE METABOLIC PANEL - Abnormal    Sodium 140      Potassium 3.4 (*)     Chloride 107      CO2 25      Glucose 216 (*)     BUN 19      Creatinine 1.4      Calcium 9.0      Total Protein 6.2      Albumin 3.5      Total Bilirubin 0.7      Alkaline Phosphatase 58      AST 10      ALT 8 (*)     eGFR 37.6 (*)     Anion Gap 8     URINALYSIS, REFLEX TO URINE CULTURE - Abnormal    Specimen UA Urine, Clean Catch      Color, UA Yellow      Appearance, UA Clear      pH, UA 6.0      Specific Gravity, UA >1.030 (*)     Protein, UA 1+ (*)     Glucose, UA 4+ (*)     Ketones, UA Negative      Bilirubin (UA) Negative      Occult Blood UA 1+ (*)     Nitrite, UA Negative      Urobilinogen, UA Negative      Leukocytes, UA 2+ (*)     Narrative:     Specimen Source->Urine   B-TYPE NATRIURETIC PEPTIDE - Abnormal     (*)    TROPONIN I HIGH SENSITIVITY - Abnormal    Troponin I High Sensitivity 61.9 (*)    PROCALCITONIN - Abnormal    Procalcitonin 0.635 (*)    TROPONIN I HIGH SENSITIVITY - Abnormal    Troponin I High Sensitivity 81.8 (*)    URINALYSIS MICROSCOPIC - Abnormal    RBC, UA 10 (*)     WBC, UA 11 (*)     Bacteria Occasional      Yeast, UA None      Squam Epithel, UA 1      Hyaline Casts, UA 0      Microscopic Comment SEE COMMENT      Narrative:     Specimen Source->Urine   CULTURE, BLOOD    Blood Culture, Routine No Growth to date      Narrative:     Aerobic and anaerobic   CULTURE, BLOOD    Blood Culture, Routine No Growth to date      Narrative:     Aerobic and anaerobic   CULTURE, URINE   INFLUENZA A AND B ANTIGEN    Influenza A, Molecular Negative      Influenza B, Molecular Negative      Flu A &  B Source Nasal swab      Narrative:     Specimen Source->Nasopharyngeal Swab   MAGNESIUM    Magnesium 1.8     SARS-COV-2 RNA AMPLIFICATION, QUAL    SARS-CoV-2 RNA, Amplification, Qual Negative     ISTAT LACTATE    POC Lactate 1.14      Sample VENOUS     ISTAT LACTATE    POC Lactate 0.73      Sample VENOUS     POCT LACTATE     EKG Readings: (Independently Interpreted)   NSR, HR 98, right bundle branch block pattern, no STEMI.  Not significantly changed from prior.       Imaging Results              CTA Chest Non-Coronary (PE Studies) (Final result)  Result time 09/19/24 22:41:29      Final result by Gino Richardson MD (09/19/24 22:41:29)                   Impression:      No convincing evidence of pulmonary thromboembolism through the level of the proximal segmental arteries.    No large confluent airspace consolidation or evidence to suggest pulmonary infarct.  Mild bibasilar atelectasis.    Coronary artery calcification.  Aortic atherosclerosis.    Incidentally noted 1.5 cm partially calcified left thyroid lobe nodule.  Further evaluation could be performed with dedicated thyroid ultrasound if not previously performed.    Additional incidental findings as above      Electronically signed by: Gino Richardson MD  Date:    09/19/2024  Time:    22:41               Narrative:    EXAMINATION:  CTA CHEST NON CORONARY (PE STUDIES)    CLINICAL HISTORY:  Pulmonary embolism (PE) suspected, unknown D-dimer;    TECHNIQUE:  Low dose axial images, sagittal and coronal reformations were obtained from the thoracic inlet to the lung bases following the IV administration of 100 mL of Omnipaque 350.  Contrast timing was optimized to evaluate the pulmonary arteries.    COMPARISON:  Chest radiograph 09/19/2024    FINDINGS:  Visualized soft tissue structures at the base of the neck demonstrate a 1.5 cm partially calcified left thyroid lobe nodule.    The thoracic aorta maintains normal caliber, contour, and course with moderate  atherosclerotic calcification within its course as well as involving the origins of the major branch vessels.  There is no evidence of aneurysmal dilation or dissection.    The heart is not enlarged and there is no significant pericardial effusion.  There is 3 vessel coronary artery calcification.  The esophagus maintains a normal course and caliber. There is no bulky axillary or mediastinal lymph node enlargement.    There are a few suspected layering secretions within the distal trachea.  The proximal airways are patent.  Detailed evaluation of the lung parenchyma is limited by respiratory motion artifact. There is no pneumothorax. There is no large confluent airspace consolidation.  There is mild bibasilar atelectasis.  There is no significant volume of pleural fluid.    There is no large central saddle type pulmonary embolus or definite filling defect through the level of the proximal segmental arteries.    Limited images of the upper abdomen obtained during the course of this dedicated thoracic CT demonstrate no acute abnormalities.  There is mild chronic nodular thickening of the left adrenal gland.  No free intraperitoneal air within the upper abdomen.    The osseous structures demonstrate postoperative change of prior median sternotomy.  There are degenerative changes of visualized spine.  There is postoperative change of the right shoulder.                                       X-Ray Chest AP Portable (Final result)  Result time 09/19/24 18:15:46      Final result by Shruthi Finch MD (09/19/24 18:15:46)                   Impression:      No acute cardiopulmonary disease      Electronically signed by: Shruthi Finch MD  Date:    09/19/2024  Time:    18:15               Narrative:    EXAMINATION:  XR CHEST AP PORTABLE    CLINICAL HISTORY:  Sepsis;    TECHNIQUE:  Single frontal view of the chest was performed.    COMPARISON:  06/13/2024    FINDINGS:  The heart is not enlarged.  The lungs are well  "expanded and clear.  The costophrenic sulci are sharp.  There are median sternotomy sutures.  Orthopedic anchoring devices proximal right humerus.                                       Medications   albuterol-ipratropium 2.5 mg-0.5 mg/3 mL nebulizer solution 3 mL (has no administration in time range)   sodium chloride 0.9% bolus 1,000 mL 1,000 mL (0 mLs Intravenous Stopped 9/19/24 2124)   piperacillin-tazobactam 4.5 g in dextrose 5 % 100 mL IVPB (ready to mix) (0 g Intravenous Stopped 9/19/24 1920)   acetaminophen tablet 1,000 mg (1,000 mg Oral Given 9/19/24 1743)   labetaloL injection 10 mg (10 mg Intravenous Given 9/19/24 1850)   albuterol-ipratropium 2.5 mg-0.5 mg/3 mL nebulizer solution 3 mL (3 mLs Nebulization Given 9/19/24 2119)   iohexoL (OMNIPAQUE 350) injection 100 mL (100 mLs Intravenous Given 9/19/24 2211)     Medical Decision Making  Amount and/or Complexity of Data Reviewed  Independent Historian: caregiver  External Data Reviewed: notes.  Labs: ordered. Decision-making details documented in ED Course.  Radiology: ordered and independent interpretation performed. Decision-making details documented in ED Course.  ECG/medicine tests: ordered and independent interpretation performed. Decision-making details documented in ED Course.    Risk  OTC drugs.  Prescription drug management.  Decision regarding hospitalization.               Sepsis Perfusion Assessment: "I attest a sepsis perfusion exam was performed within 6 hours of sepsis, severe sepsis, or septic shock presentation, following fluid resuscitation."        Patient presents to ED as above.  She is febrile to 102.1 and hypoxic to upper 80s on room air.  Does not normally have oxygen requirement.  61.9.  Also hypertensive to 200s over 90s.  Differential includes but not limited to sepsis, bacteremia, pneumonia, viral etiology, UTI.  EKG shows no evidence acute ischemia or arrhythmia.  Chest x-ray independently reviewed by me and shows no evidence " acute cardiopulmonary process per radiology read.  All labs reviewed by me and significant for negative COVID, negative flu, normal WBC count, normal hemoglobin, K 3.4, glucose 216, creatinine 1.4 stable from baseline, lactic acid 0.73, , initial high sensitivity troponin 61.  UA not overly convincing for UTI.  Procalcitonin 0.635.  She was given a DuoNeb here, oral Tylenol, IV labetalol 10 mg x 1, limited fluid bolus given normal lactic acid as well as hypertension.  She was given IV Zosyn for likely respiratory source.  Her BP improved after labetalol.  CT chest obtained for further evaluation showing no definite evidence of PE or focal infiltrate.  Will admit to hospitalist for further management and workup.    This patient does have evidence of infective focus  My overall impression is sepsis.  Source: Unknown  Antibiotics given-   Antibiotics (72h ago, onward)      None          Latest lactate reviewed-  Recent Labs   Lab 09/19/24  2130   POCLAC 0.73     Organ dysfunction indicated by Acute respiratory failure    Fluid challenge Not needed - patient is not hypotensive      Post- resuscitation assessment Yes Perfusion exam was performed within 6 hours of septic shock presentation after bolus shows Adequate tissue perfusion assessed by non-invasive monitoring       Will Not start Pressors- Levophed for MAP of 65  Source control achieved by:  Cultures, antibiotics            CRITICAL CARE:    The high probability of sudden, clinically significant deterioration in the patient's condition required the  highest level of my preparedness to intervene urgently.    The services I provided to this patient were to treat and/or prevent clinically significant deterioration  that could result in severe disability or death. Services included some or all of the following: chart data  review, reviewing nursing notes and/or old charts, documentation time, consultant collaboration  regarding findings and treatment options,  medication orders and management, direct patient care, re-  evaluations, vital sign assessments and ordering, interpreting and reviewing diagnostic studies/lab tests.    Aggregate critical care time was 45 minutes which includes only time during which I was engaged in  work directly related to the patient's care, as described above, whether at the bedside or elsewhere in  the Emergency Department. It did not include time spent performing other reported procedures or the  services of residents, students, nurses or midlevel providers.        Clinical Impression:  Final diagnoses:  [A41.9] Sepsis (Primary)  [R06.02] SOB (shortness of breath)  [I21.4] NSTEMI (non-ST elevated myocardial infarction)  [R09.02] Hypoxia          ED Disposition Condition    Admit Stable                Genesis Santiago MD  09/20/24 0428

## 2024-09-20 NOTE — HPI
84 WF with PMHx DMII on insulin , CAD with hx of CABG 1995 ,  HLD, COPD not on oxygen at home , HTN, CKD, dementia, TIA, PVD presents to ED with cough for a couple of days with development of fever today per family.  Patient has seemed a little more confused to family and generally weak.  She has also complained of some shortness of breath with exertion.  Denies any chest pain, abdominal pain, nausea or vomiting, bowel changes or dysuria.  Did recently travel with family out of state and was exposed to persons that later tested positive for COVID-19.     Smoker   1 pack per day   Non drinker    Lives Alone       She says she just woke up feeling weak     Was fine night before     She noticed fever and 24 hours of cough that was new.  Non productive     No sick contracts    She has not noticed significant SOB  No chest pain         Otherwise nothing new she says         In ER her WBC normal but had Left shift 80% Neutrophils     Potassium was low   BUN CR up some but she has CKD     BNP mildly up  Troponin was up and going up on repeat higher   No chest pain   EKG normal       UA had some leuk esterase   but mild       CTA was done and negative for PE or PNA          Plan is to admit her to our service for the confusion , fever,  and elevated troponin

## 2024-09-20 NOTE — PLAN OF CARE
09/20/24 0740   Patient Assessment/Suction   Level of Consciousness (AVPU) alert   Respiratory Effort Normal;Unlabored   Expansion/Accessory Muscles/Retractions no use of accessory muscles   All Lung Fields Breath Sounds Anterior:;Lateral:;diminished;wheezes, expiratory   Rhythm/Pattern, Respiratory pattern regular   Cough Frequency infrequent   Cough Type nonproductive   Skin Integrity   $ Wound Care Tech Time 15 min   Area Observed Left;Right;Behind ear;Nares   PRE-TX-O2   Device (Oxygen Therapy) nasal cannula   $ Is the patient on Low Flow Oxygen? Yes   Flow (L/min) (Oxygen Therapy) 2   Oxygen Concentration (%) 28   SpO2 96 %   Pulse Oximetry Type Continuous   $ Pulse Oximetry - Multiple Charge Pulse Oximetry - Multiple   Pulse 91   Resp (!) 22   Aerosol Therapy   $ Aerosol Therapy Charges Aerosol Treatment  (duoneb)   Daily Review of Necessity (SVN) completed   Respiratory Treatment Status (SVN) given   Treatment Route (SVN) mask;air   Patient Position semi-Castillo's   Post Treatment Assessment (SVN) breath sounds improved   Signs of Intolerance (SVN) none   Breath Sounds Post-Respiratory Treatment   Throughout All Fields Post-Treatment All Fields   Throughout All Fields Post-Treatment aeration increased   Post-treatment Heart Rate (beats/min) 87   Post-treatment Resp Rate (breaths/min) 20   General Safety Checklist   Safety Promotion/Fall Prevention side rails raised   Respiratory Interventions   Cough And Deep Breathing done with encouragement   Ready to Wean/Extubation Screen   FIO2<=50 (chart decimal) 0.28   Education   $ Education Bronchodilator;Oxygen;15 min

## 2024-09-20 NOTE — PROGRESS NOTES
Pharmacist Renal Adjustment Note    Mary Cynthia Cryer is a 84 y.o. female being treated with Famotidine.    Patient Data:    Vital Signs (Most Recent):  Temp: 98.8 °F (37.1 °C) (09/19/24 2012)  Pulse: 84 (09/20/24 0500)  Resp: 20 (09/19/24 2119)  BP: (!) 151/60 (09/20/24 0500)  SpO2: 95 % (09/20/24 0500) Vital Signs (72h Range):  Temp:  [98.8 °F (37.1 °C)-102.1 °F (38.9 °C)]   Pulse:  []   Resp:  [16-23]   BP: (103-201)/(42-82)   SpO2:  [91 %-99 %]      Recent Labs   Lab 09/19/24  1828   CREATININE 1.4     Serum creatinine: 1.4 mg/dL 09/19/24 1828  Estimated creatinine clearance: 41.8 mL/min    Famotidine 20 mg every 12 hours will be changed to Famotidine 20 mg every 24 hours due to CrCl < 50 per Renal Dose Adjustment protocol.    Pharmacist's Name: Ema Martinez  Pharmacist's Extension: [phone]

## 2024-09-20 NOTE — H&P
"  [facility] - Emergency Dept  Hospital Medicine  History & Physical    Patient Name: Mary Cynthia Cryer  MRN: [id number]  Patient Class: OP- Observation  Admission Date: 9/19/2024  Attending Physician: Roiht Tapia MD  Primary Care Provider: Emmy Piña MD         Patient information was obtained from patient and ER records.     Subjective:     Principal Problem:COPD exacerbation    Chief Complaint:   Chief Complaint   Patient presents with    generalized weakness     Pt arrived via EMS from home complaining of generalized illness. Pt is febrile with a cough x "few days"        HPI: 84 WF with PMHx DMII on insulin , CAD with hx of CABG 1995 ,  HLD, COPD not on oxygen at home , HTN, CKD, dementia, TIA, PVD presents to ED with cough for a couple of days with development of fever today per family.  Patient has seemed a little more confused to family and generally weak.  She has also complained of some shortness of breath with exertion.  Denies any chest pain, abdominal pain, nausea or vomiting, bowel changes or dysuria.  Did recently travel with family out of state and was exposed to persons that later tested positive for COVID-19.     Smoker   1 pack per day   Non drinker    Lives Alone       She says she just woke up feeling weak     Was fine night before     She noticed fever and 24 hours of cough that was new.  Non productive     No sick contracts    She has not noticed significant SOB  No chest pain         Otherwise nothing new she says         In ER her WBC normal but had Left shift 80% Neutrophils     Potassium was low   BUN CR up some but she has CKD     BNP mildly up  Troponin was up and going up on repeat higher   No chest pain   EKG normal       UA had some leuk esterase   but mild       CTA was done and negative for PE or PNA          Plan is to admit her to our service for the confusion , fever,  and elevated troponin    Past Medical History:   Diagnosis Date    Allergy     " Anticoagulant long-term use     Diabetes mellitus     Diabetes mellitus, type 2     Disorder of kidney and ureter     Hyperlipidemia     Hypertension     Obesity     Peripheral vascular disease     Thyroid disease     Type 2 diabetes mellitus        Past Surgical History:   Procedure Laterality Date    ANGIOGRAM, EXTREMITY, UNILATERAL Right 9/6/2023    Procedure: ANGIOGRAM, EXTREMITY, UNILATERAL;  Surgeon: Cruz Armas MD;  Location: [facility] OR;  Service: Vascular;  Laterality: Right;    ANGIOGRAPHY OF LOWER EXTREMITY N/A 12/11/2019    Procedure: Angiogram Extremity Unilateral;  Surgeon: Cruz Armas MD;  Location: [facility] CATH/EP LAB;  Service: Cardiology;  Laterality: N/A;    ANGIOGRAPHY OF LOWER EXTREMITY Right 9/6/2023    Procedure: Angiogram Extremity Unilateral;  Surgeon: Cruz Armas MD;  Location: [facility] CATH/EP LAB;  Service: General;  Laterality: Right;    ANKLE HARDWARE REMOVAL Right 12/15/2022    Procedure: REMOVAL, HARDWARE, ANKLE;  Surgeon: Nae Jerry MD;  Location: [facility] OR;  Service: Orthopedics;  Laterality: Right;    CORONARY ARTERY BYPASS GRAFT      EMBOLECTOMY, LOWER EXTREMITY Right 9/6/2023    Procedure: EMBOLECTOMY, LOWER EXTREMITY;  Surgeon: Cruz Armas MD;  Location: [facility];  Service: Vascular;  Laterality: Right;    HYSTERECTOMY      partial    IRRIGATION AND DEBRIDEMENT OF LOWER EXTREMITY Right 12/15/2022    Procedure: IRRIGATION AND DEBRIDEMENT, LOWER EXTREMITY;  Surgeon: Nae Jerry MD;  Location: [facility] OR;  Service: Orthopedics;  Laterality: Right;    OPEN REDUCTION AND INTERNAL FIXATION (ORIF) OF INJURY OF ANKLE Right 7/20/2022    Procedure: ORIF, ANKLE- medial mallelous;  Surgeon: Nae Jerry MD;  Location: [facility] OR;  Service: Orthopedics;  Laterality: Right;    PERCUTANEOUS TRANSLUMINAL ANGIOPLASTY N/A 9/6/2023    Procedure: PTA (ANGIOPLASTY, PERCUTANEOUS, TRANSLUMINAL);  Surgeon: Cruz Armas MD;  Location: [facility] CATH/EP LAB;  Service: General;  Laterality:  "N/A;    PERIPHERAL ANGIOGRAPHY  12/11/2019       Review of patient's allergies indicates:   Allergen Reactions    Latex Rash    Tetracyclines Swelling       Current Facility-Administered Medications on File Prior to Encounter   Medication    gentian violet 1 % topical solution 0.5 mL     Current Outpatient Medications on File Prior to Encounter   Medication Sig    albuterol (PROVENTIL/VENTOLIN HFA) 90 mcg/actuation inhaler Inhale 2 puffs into the lungs every 6 (six) hours as needed for Shortness of Breath or Wheezing.    cilostazoL (PLETAL) 100 MG Tab Take 100 mg by mouth 2 (two) times daily.    clopidogreL (PLAVIX) 75 mg tablet Take 75 mg by mouth once daily.    FARXIGA 10 mg tablet Take 1 tablet by mouth once daily.    gabapentin (NEURONTIN) 100 MG capsule Take 100 mg by mouth 3 (three) times daily.    insulin glargine (LANTUS) 100 unit/mL injection Inject 35 Units into the skin 2 (two) times a day.    isosorbide mononitrate (IMDUR) 30 MG 24 hr tablet Take 30 mg by mouth once daily.    levothyroxine (SYNTHROID) 25 MCG tablet Take 25 mcg by mouth once daily.    lisinopriL (PRINIVIL,ZESTRIL) 20 MG tablet Take 20 mg by mouth once daily.    metoprolol tartrate (LOPRESSOR) 50 MG tablet Take 50 mg by mouth 2 (two) times daily.    nitroGLYCERIN (NITROSTAT) 0.3 MG SL tablet Place 0.3 mg under the tongue every 5 (five) minutes as needed for Chest pain.    NOVOLIN R REGULAR U-100 INSULN 100 unit/mL injection Inject 6 Units into the skin 2 (two) times daily before meals.    pantoprazole (PROTONIX) 40 MG tablet Take 40 mg by mouth once daily.    traMADoL (ULTRAM) 50 mg tablet Take 50 mg by mouth every 8 (eight) hours as needed for Pain.    amLODIPine (NORVASC) 5 MG tablet Take 5 mg by mouth once daily. (Patient not taking: Reported on 9/19/2024)    aspirin (ECOTRIN) 81 MG EC tablet Take 81 mg by mouth once daily.  (Patient not taking: Reported on 9/19/2024)    DROPLET INSULIN SYRINGE 1 mL 31 gauge x 15/64" Syrg     insulin " "syringe-needle U-100 0.3 mL 30 gauge x 5/16" Syrg by Other route.    rosuvastatin (CRESTOR) 40 MG Tab Take 40 mg by mouth once daily. (Patient not taking: Reported on 9/19/2024)    TRUE METRIX GLUCOSE TEST STRIP Strp     TRUEPLUS LANCETS 30 gauge Misc      Family History       Problem Relation (Age of Onset)    Alzheimer's disease Mother    Diabetes Brother    Heart disease Father, Brother    No Known Problems Sister, Daughter, Son, Sister, Sister, Sister, Sister, Brother, Son, Son          Tobacco Use    Smoking status: Every Day     Current packs/day: 0.50     Average packs/day: 0.5 packs/day for 65.0 years (32.5 ttl pk-yrs)     Types: Cigarettes    Smokeless tobacco: Never    Tobacco comments:     Pt has a 65 year smoking history. Pt doesn't know at this time how much she smokes, it varies day to day. Currently has a patch on. [employer] member: [phone]    Substance and Sexual Activity    Alcohol use: No    Drug use: No    Sexual activity: Never     Review of Systems   All other systems reviewed and are negative.    Objective:     Vital Signs (Most Recent):  Temp: 98.8 °F (37.1 °C) (09/19/24 2012)  Pulse: 84 (09/20/24 0500)  Resp: 20 (09/19/24 2119)  BP: (!) 151/60 (09/20/24 0500)  SpO2: 95 % (09/20/24 0500) Vital Signs (24h Range):  Temp:  [98.8 °F (37.1 °C)-102.1 °F (38.9 °C)] 98.8 °F (37.1 °C)  Pulse:  [] 84  Resp:  [16-23] 20  SpO2:  [91 %-99 %] 95 %  BP: (103-201)/(42-82) 151/60     Weight: 117.9 kg (260 lb)  Body mass index is 39.53 kg/m².     Physical Exam  Vitals and nursing note reviewed.   Constitutional:       Appearance: Normal appearance.   HENT:      Head: Normocephalic.      Nose: Nose normal.      Mouth/Throat:      Mouth: Mucous membranes are moist.   Eyes:      Pupils: Pupils are equal, round, and reactive to light.   Cardiovascular:      Rate and Rhythm: Normal rate and regular rhythm.      Pulses: Normal pulses.   Pulmonary:      Effort: Pulmonary effort is normal.      Breath " "sounds: Wheezing present.   Abdominal:      General: Abdomen is flat. Bowel sounds are normal.      Palpations: Abdomen is soft.   Musculoskeletal:         General: Normal range of motion.      Cervical back: Normal range of motion.   Skin:     General: Skin is warm.      Capillary Refill: Capillary refill takes less than 2 seconds.   Neurological:      General: No focal deficit present.      Mental Status: She is alert.   Psychiatric:         Mood and Affect: Mood normal.              CRANIAL NERVES     CN III, IV, VI   Pupils are equal, round, and reactive to light.       Significant Labs: All pertinent labs within the past 24 hours have been reviewed.  CBC:   Recent Labs   Lab 09/19/24 1828   WBC 12.10   HGB 14.6   HCT 44.0        CMP:   Recent Labs   Lab 09/19/24 1828      K 3.4*      CO2 25   *   BUN 19   CREATININE 1.4   CALCIUM 9.0   PROT 6.2   ALBUMIN 3.5   BILITOT 0.7   ALKPHOS 58   AST 10   ALT 8*   ANIONGAP 8     Cardiac Markers:   Recent Labs   Lab 09/19/24 1828   *     Coagulation: No results for input(s): "PT", "INR", "APTT" in the last 48 hours.  Lactic Acid: No results for input(s): "LACTATE" in the last 48 hours.  Lipase: No results for input(s): "LIPASE" in the last 48 hours.  Magnesium:   Recent Labs   Lab 09/19/24 1828   MG 1.8     Troponin:   Recent Labs   Lab 09/19/24 1828 09/19/24  2329   TROPONINIHS 61.9* 81.8*     TSH: No results for input(s): "TSH" in the last 4320 hours.  Urine Studies:   Recent Labs   Lab 09/19/24  2356   COLORU Yellow   APPEARANCEUA Clear   PHUR 6.0   SPECGRAV >1.030*   PROTEINUA 1+*   GLUCUA 4+*   KETONESU Negative   BILIRUBINUA Negative   OCCULTUA 1+*   NITRITE Negative   UROBILINOGEN Negative   LEUKOCYTESUR 2+*   RBCUA 10*   WBCUA 11*   BACTERIA Occasional   SQUAMEPITHEL 1   HYALINECASTS 0       Significant Imaging: I have reviewed all pertinent imaging results/findings within the past 24 hours.  I have reviewed and interpreted " all pertinent imaging results/findings within the past 24 hours.  Assessment/Plan:     * COPD exacerbation  Not on oxygen at home normally she says     I think she developed copd exacerbation leading to hypoxemia     Which then cause tissue hypoxia and her confusion and weakness and lethargy     She has left shift  And procalcitonin was elevated       PLAN    - IV steroids soluMedrol 40 mg Q8     - IVFs  1/2 NS with 20 KCL per bag x 24 hours    - robitussin 400 mg PO TID    - IV Zithromax and IV Rocephin       - check viral PCR panel     Flu and covid were both NEG      - continuous pulse ox and tele bed     - DuoNEBS QID scheduled     Elevated troponin  No Chest pain but has CAD history    CABG 1995     Has been fine since she said       I suspect this is just demand ischemia from the hypoxic conditions and stress of copd exacerbation       PLAN      - control HR and BP     - metoprolol 25 mg PO BID     - treat her copd exacerbation     - supplemental oxygen     - telemetry floor bed continuous pulse ox       - Aspirin po daily I started 162 mg     - Plavix I continued  75 mg     - and I did full dose Lovenox for now  1 mg / kg  sq q12       - consult Cardiology for this morning       - NPO       - new ECHO ordered       Weakness    I suspect she has COPD exacerbation going on     And I bet ,  she was hypoxemic all day and night prior to waking up weak.        The hypoxia , lead to her muscle weakness and her increased confusion       She will do much better now that he is on oxygen all night       PLAN    - hydrate   - fix electrolytes   - nc oxygen and keep on Continuous pulse ox and tele   - treat the copd exacerbation   Check Tsh free T4 , b12, folate , vit D  if not done recently       VTE Risk Mitigation (From admission, onward)           Ordered     enoxaparin injection 120 mg  Every 12 hours         09/20/24 0529     IP VTE HIGH RISK PATIENT  Once         09/20/24 0521     Place sequential compression  device  Until discontinued         09/20/24 0521                       On 09/20/2024, patient should be placed in hospital observation services under my care.        Pharmacist Renal Adjustment Note    Mary Cynthia Cryer is a 84 y.o. female being treated with Famotidine.    Patient Data:    Vital Signs (Most Recent):  Temp: 98.8 °F (37.1 °C) (09/19/24 2012)  Pulse: 84 (09/20/24 0500)  Resp: 20 (09/19/24 2119)  BP: (!) 151/60 (09/20/24 0500)  SpO2: 95 % (09/20/24 0500) Vital Signs (72h Range):  Temp:  [98.8 °F (37.1 °C)-102.1 °F (38.9 °C)]   Pulse:  []   Resp:  [16-23]   BP: (103-201)/(42-82)   SpO2:  [91 %-99 %]      Recent Labs   Lab 09/19/24  1828   CREATININE 1.4     Serum creatinine: 1.4 mg/dL 09/19/24 1828  Estimated creatinine clearance: 41.8 mL/min    Famotidine 20 mg every 12 hours will be changed to Famotidine 20 mg every 24 hours due to CrCl < 50 per Renal Dose Adjustment protocol.    Pharmacist's Name: Ema Martinez  Pharmacist's Extension: [phone]      Rohit Tapia MD  Department of Hospital Medicine  [facility] - Emergency Dept

## 2024-09-20 NOTE — ED NOTES
Attempted to call report. Per staff the nurse getting the patient is currently discharging a patient and is unable to take report.

## 2024-09-20 NOTE — CARE UPDATE
Patient seen and examined in the ER.  Reviewed chart and agree with H&P outlined by Dr Tapia.   Continue abx for UTI and COPD exacerbation.  Continue steroid and nebs.   Cardioolgy was consulted for elevated troponin -- likely demand ischemia; consider stress test once acute issues improve.  Echocardiogram pending.   Repeat labs in AM.

## 2024-09-20 NOTE — CONSULTS
[facility] - Emergency Dept  Department of Cardiology  Consult Note      PATIENT NAME: Mary Cynthia Cryer    MRN: [id number]  TODAY'S DATE: 09/20/2024  ADMIT DATE: 9/19/2024                          CONSULT REQUESTED BY: Ashleigh áVzquez MD    SUBJECTIVE     PRINCIPAL PROBLEM: COPD exacerbation    HPI:    Patient is an 84-year-old female who presented to the emergency room with complaints of cough and fever at home.  She had apparently been exposed to COVID-19 but did not test positive here.  Patient is being treated for COPD exacerbation.  She was noted to have a mild troponin elevation and does have a history of CAD with CABG in the past.  Patient does also have a history of hypertension, CKD, TIA, PVD, and dementia according to H&P.  Patient was seen lying in bed with no acute distress noted.  Breathing was stable and she was resting quietly.  She aroused easily to voice and had no complaints at this time.    REASON FOR CONSULT:  From Hospitalist H&P: 84 WF with PMHx DMII on insulin , CAD with hx of CABG 1995 ,  HLD, COPD not on oxygen at home , HTN, CKD, dementia, TIA, PVD presents to ED with cough for a couple of days with development of fever today per family.  Patient has seemed a little more confused to family and generally weak.  She has also complained of some shortness of breath with exertion.  Denies any chest pain, abdominal pain, nausea or vomiting, bowel changes or dysuria.  Did recently travel with family out of state and was exposed to persons that later tested positive for COVID-19.      Smoker   1 pack per day   Non drinker     Lives Alone         She says she just woke up feeling weak      Was fine night before      She noticed fever and 24 hours of cough that was new.  Non productive      No sick contracts     She has not noticed significant SOB  No chest pain            Otherwise nothing new she says            In ER her WBC normal but had Left shift 80% Neutrophils      Potassium was  low   BUN CR up some but she has CKD      BNP mildly up  Troponin was up and going up on repeat higher   No chest pain   EKG normal         UA had some leuk esterase   but mild         CTA was done and negative for PE or PNA             Plan is to admit her to our service for the confusion , fever,  and elevated troponin      Review of patient's allergies indicates:   Allergen Reactions    Latex Rash    Tetracyclines Swelling       Past Medical History:   Diagnosis Date    Allergy     Anticoagulant long-term use     Diabetes mellitus     Diabetes mellitus, type 2     Disorder of kidney and ureter     Hyperlipidemia     Hypertension     Obesity     Peripheral vascular disease     Thyroid disease     Type 2 diabetes mellitus      Past Surgical History:   Procedure Laterality Date    ANGIOGRAM, EXTREMITY, UNILATERAL Right 9/6/2023    Procedure: ANGIOGRAM, EXTREMITY, UNILATERAL;  Surgeon: Cruz Armas MD;  Location: [facility] OR;  Service: Vascular;  Laterality: Right;    ANGIOGRAPHY OF LOWER EXTREMITY N/A 12/11/2019    Procedure: Angiogram Extremity Unilateral;  Surgeon: Cruz Armas MD;  Location: [facility] CATH/EP LAB;  Service: Cardiology;  Laterality: N/A;    ANGIOGRAPHY OF LOWER EXTREMITY Right 9/6/2023    Procedure: Angiogram Extremity Unilateral;  Surgeon: Cruz Armas MD;  Location: [facility] CATH/EP LAB;  Service: General;  Laterality: Right;    ANKLE HARDWARE REMOVAL Right 12/15/2022    Procedure: REMOVAL, HARDWARE, ANKLE;  Surgeon: Nae Jerry MD;  Location: [facility];  Service: Orthopedics;  Laterality: Right;    CORONARY ARTERY BYPASS GRAFT      EMBOLECTOMY, LOWER EXTREMITY Right 9/6/2023    Procedure: EMBOLECTOMY, LOWER EXTREMITY;  Surgeon: Cruz Armas MD;  Location: [facility];  Service: Vascular;  Laterality: Right;    HYSTERECTOMY      partial    IRRIGATION AND DEBRIDEMENT OF LOWER EXTREMITY Right 12/15/2022    Procedure: IRRIGATION AND DEBRIDEMENT, LOWER EXTREMITY;  Surgeon: Nae Jerry MD;   Location: [facility] OR;  Service: Orthopedics;  Laterality: Right;    OPEN REDUCTION AND INTERNAL FIXATION (ORIF) OF INJURY OF ANKLE Right 7/20/2022    Procedure: ORIF, ANKLE- medial mallelous;  Surgeon: Nae Jerry MD;  Location: [facility] OR;  Service: Orthopedics;  Laterality: Right;    PERCUTANEOUS TRANSLUMINAL ANGIOPLASTY N/A 9/6/2023    Procedure: PTA (ANGIOPLASTY, PERCUTANEOUS, TRANSLUMINAL);  Surgeon: Cruz Armas MD;  Location: [facility] CATH/EP LAB;  Service: General;  Laterality: N/A;    PERIPHERAL ANGIOGRAPHY  12/11/2019     Social History     Tobacco Use    Smoking status: Every Day     Current packs/day: 0.50     Average packs/day: 0.5 packs/day for 65.0 years (32.5 ttl pk-yrs)     Types: Cigarettes    Smokeless tobacco: Never    Tobacco comments:     Pt has a 65 year smoking history. Pt doesn't know at this time how much she smokes, it varies day to day. Currently has a patch on. [employer] member: [id number]    Substance Use Topics    Alcohol use: No    Drug use: No        REVIEW OF SYSTEMS  Per HPI    OBJECTIVE     VITAL SIGNS (Most Recent)  Temp: 98.8 °F (37.1 °C) (09/19/24 2012)  Pulse: 94 (09/20/24 1351)  Resp: 20 (09/20/24 1351)  BP: (!) 145/58 (09/20/24 1017)  SpO2: 97 % (09/20/24 1351)    VENTILATION STATUS  Resp: 20 (09/20/24 1351)  SpO2: 97 % (09/20/24 1351)  Oxygen Concentration (%):  [28] 28        I & O (Last 24H):  Intake/Output Summary (Last 24 hours) at 9/20/2024 1354  Last data filed at 9/20/2024 1127  Gross per 24 hour   Intake 1450 ml   Output --   Net 1450 ml       WEIGHTS  Wt Readings from Last 1 Encounters:   09/19/24 1703 117.9 kg (260 lb)       PHYSICAL EXAM  CONSTITUTIONAL: No fever, no chills  HEENT: Normocephalic, atraumatic,pupils reactive to light                 NECK:  No JVD no carotid bruit  CVS: S1S2+, RRR  LUNGS: Clear  ABDOMEN: Soft, NT, BS+  EXTREMITIES: No cyanosis, edema  : No arrieta catheter  NEURO: AAO X 3  PSY: Normal affect      HOME MEDICATIONS:  Current  "Facility-Administered Medications on File Prior to Encounter   Medication Dose Route Frequency Provider Last Rate Last Admin    gentian violet 1 % topical solution 0.5 mL  0.5 mL Topical (Top) Daily PRN Anel Li MD   0.5 mL at 01/18/23 1351     Current Outpatient Medications on File Prior to Encounter   Medication Sig Dispense Refill    albuterol (PROVENTIL/VENTOLIN HFA) 90 mcg/actuation inhaler Inhale 2 puffs into the lungs every 6 (six) hours as needed for Shortness of Breath or Wheezing.      cilostazoL (PLETAL) 100 MG Tab Take 100 mg by mouth 2 (two) times daily.      clopidogreL (PLAVIX) 75 mg tablet Take 75 mg by mouth once daily.      FARXIGA 10 mg tablet Take 1 tablet by mouth once daily.      gabapentin (NEURONTIN) 100 MG capsule Take 100 mg by mouth 3 (three) times daily.      insulin glargine (LANTUS) 100 unit/mL injection Inject 35 Units into the skin 2 (two) times a day.      isosorbide mononitrate (IMDUR) 30 MG 24 hr tablet Take 30 mg by mouth once daily.      levothyroxine (SYNTHROID) 25 MCG tablet Take 25 mcg by mouth once daily.      lisinopriL (PRINIVIL,ZESTRIL) 20 MG tablet Take 20 mg by mouth once daily.      metoprolol tartrate (LOPRESSOR) 50 MG tablet Take 50 mg by mouth 2 (two) times daily.      nitroGLYCERIN (NITROSTAT) 0.3 MG SL tablet Place 0.3 mg under the tongue every 5 (five) minutes as needed for Chest pain.      NOVOLIN R REGULAR U-100 INSULN 100 unit/mL injection Inject 6 Units into the skin 2 (two) times daily before meals.      pantoprazole (PROTONIX) 40 MG tablet Take 40 mg by mouth once daily.      traMADoL (ULTRAM) 50 mg tablet Take 50 mg by mouth every 8 (eight) hours as needed for Pain.      amLODIPine (NORVASC) 5 MG tablet Take 5 mg by mouth once daily. (Patient not taking: Reported on 9/19/2024)      aspirin (ECOTRIN) 81 MG EC tablet Take 81 mg by mouth once daily.  (Patient not taking: Reported on 9/19/2024)      DROPLET INSULIN SYRINGE 1 mL 31 gauge x 15/64" Syrg    " "   insulin syringe-needle U-100 0.3 mL 30 gauge x 5/16" Syrg by Other route.      rosuvastatin (CRESTOR) 40 MG Tab Take 40 mg by mouth once daily. (Patient not taking: Reported on 9/19/2024)      TRUE METRIX GLUCOSE TEST STRIP Strp       TRUEPLUS LANCETS 30 gauge Misc          SCHEDULED MEDS:   albuterol-ipratropium  3 mL Nebulization Q6H WAKE    aspirin  162 mg Oral Daily    azithromycin  500 mg Intravenous Q24H    cefTRIAXone (Rocephin) IV (PEDS and ADULTS)  2 g Intravenous Q24H    enoxparin  1 mg/kg Subcutaneous Q12H (prophylaxis, 0900/2100)    famotidine (PF)  20 mg Intravenous Daily    guaiFENesin 100 mg/5 ml  400 mg Oral TID    insulin glargine U-100  30 Units Subcutaneous Daily    methylPREDNISolone injection (PEDS and ADULTS)  40 mg Intravenous Q8H    metoprolol tartrate  25 mg Oral BID    nicotine  1 patch Transdermal Daily    senna-docusate 8.6-50 mg  2 tablet Oral BID       CONTINUOUS INFUSIONS:   0.45% NaCl with KCl 20 mEq infusion  100 mL/hr Intravenous Continuous 100 mL/hr at 09/20/24 1153 100 mL/hr at 09/20/24 1153       PRN MEDS:  Current Facility-Administered Medications:     acetaminophen, 650 mg, Oral, Q8H PRN    acetaminophen, 650 mg, Oral, Q4H PRN    albuterol-ipratropium, 3 mL, Nebulization, Q6H PRN    aluminum-magnesium hydroxide-simethicone, 30 mL, Oral, QID PRN    dextrose 50%, 12.5 g, Intravenous, PRN    dextrose 50%, 12.5 g, Intravenous, PRN    dextrose 50%, 25 g, Intravenous, PRN    gentian violet, 0.5 mL, Topical (Top), Daily PRN    glucagon (human recombinant), 1 mg, Intramuscular, PRN    glucose, 16 g, Oral, PRN    glucose, 24 g, Oral, PRN    insulin aspart U-100, 0-10 Units, Subcutaneous, Q6H PRN    magnesium oxide, 800 mg, Oral, PRN    magnesium oxide, 800 mg, Oral, PRN    melatonin, 6 mg, Oral, Nightly PRN    naloxone, 0.02 mg, Intravenous, PRN    ondansetron, 4 mg, Intravenous, Q6H PRN    potassium bicarbonate, 35 mEq, Oral, PRN    potassium bicarbonate, 50 mEq, Oral, PRN    " "potassium bicarbonate, 60 mEq, Oral, PRN    potassium, sodium phosphates, 2 packet, Oral, PRN    potassium, sodium phosphates, 2 packet, Oral, PRN    potassium, sodium phosphates, 2 packet, Oral, PRN    sodium chloride 0.9%, 10 mL, Intravenous, Q12H PRN    LABS AND DIAGNOSTICS     CBC LAST 3 DAYS  Recent Labs   Lab 09/19/24 1828   WBC 12.10   RBC 4.50   HGB 14.6   HCT 44.0   MCV 98   MCH 32.4*   MCHC 33.2   RDW 13.3      MPV 10.7   GRAN 80.8*  9.8*   LYMPH 8.8*  1.1   MONO 9.4  1.1*   BASO 0.03   NRBC 0       COAGULATION LAST 3 DAYS  No results for input(s): "LABPT", "INR", "APTT" in the last 168 hours.    CHEMISTRY LAST 3 DAYS  Recent Labs   Lab 09/19/24 1828 09/20/24  0714    143   K 3.4* 3.4*    109   CO2 25 24   ANIONGAP 8 10   BUN 19 19   CREATININE 1.4 1.4   * 162*   CALCIUM 9.0 8.6*   MG 1.8 1.8   ALBUMIN 3.5 3.0*   PROT 6.2 5.6*   ALKPHOS 58 49*   ALT 8* 7*   AST 10 9*   BILITOT 0.7 0.6       CARDIAC PROFILE LAST 3 DAYS  Recent Labs   Lab 09/19/24 1828 09/19/24 2329 09/20/24  0714   *  --   --    TROPONINIHS 61.9* 81.8* 69.3*       ENDOCRINE LAST 3 DAYS  Recent Labs   Lab 09/19/24 1828 09/20/24  0714   TSH  --  1.204   PROCAL 0.635*  --        LAST ARTERIAL BLOOD GAS  ABG  No results for input(s): "PH", "PO2", "PCO2", "HCO3", "BE" in the last 168 hours.    LAST 7 DAYS MICROBIOLOGY   Microbiology Results (last 7 days)       Procedure Component Value Units Date/Time    Respiratory Infection Panel (PCR), Nasopharyngeal [[id number]]     Order Status: No result Specimen: Nasopharyngeal Swab     Blood culture x two cultures. Draw prior to antibiotics. [[id number]] Collected: 09/19/24 1829    Order Status: Completed Specimen: Blood from Peripheral, Antecubital, Left Updated: 09/20/24 0158     Blood Culture, Routine No Growth to date    Narrative:      Aerobic and anaerobic    Blood culture x two cultures. Draw prior to antibiotics. [[id number]] Collected: 09/19/24 1839    " Order Status: Completed Specimen: Blood Updated: 09/20/24 0158     Blood Culture, Routine No Growth to date    Narrative:      Aerobic and anaerobic    Urine culture [[id number]] Collected: 09/19/24 2356    Order Status: No result Specimen: Urine Updated: 09/20/24 0039            MOST RECENT IMAGING  CTA Chest Non-Coronary (PE Studies)  Narrative: EXAMINATION:  CTA CHEST NON CORONARY (PE STUDIES)    CLINICAL HISTORY:  Pulmonary embolism (PE) suspected, unknown D-dimer;    TECHNIQUE:  Low dose axial images, sagittal and coronal reformations were obtained from the thoracic inlet to the lung bases following the IV administration of 100 mL of Omnipaque 350.  Contrast timing was optimized to evaluate the pulmonary arteries.    COMPARISON:  Chest radiograph 09/19/2024    FINDINGS:  Visualized soft tissue structures at the base of the neck demonstrate a 1.5 cm partially calcified left thyroid lobe nodule.    The thoracic aorta maintains normal caliber, contour, and course with moderate atherosclerotic calcification within its course as well as involving the origins of the major branch vessels.  There is no evidence of aneurysmal dilation or dissection.    The heart is not enlarged and there is no significant pericardial effusion.  There is 3 vessel coronary artery calcification.  The esophagus maintains a normal course and caliber. There is no bulky axillary or mediastinal lymph node enlargement.    There are a few suspected layering secretions within the distal trachea.  The proximal airways are patent.  Detailed evaluation of the lung parenchyma is limited by respiratory motion artifact. There is no pneumothorax. There is no large confluent airspace consolidation.  There is mild bibasilar atelectasis.  There is no significant volume of pleural fluid.    There is no large central saddle type pulmonary embolus or definite filling defect through the level of the proximal segmental arteries.    Limited images of the upper  abdomen obtained during the course of this dedicated thoracic CT demonstrate no acute abnormalities.  There is mild chronic nodular thickening of the left adrenal gland.  No free intraperitoneal air within the upper abdomen.    The osseous structures demonstrate postoperative change of prior median sternotomy.  There are degenerative changes of visualized spine.  There is postoperative change of the right shoulder.  Impression: No convincing evidence of pulmonary thromboembolism through the level of the proximal segmental arteries.    No large confluent airspace consolidation or evidence to suggest pulmonary infarct.  Mild bibasilar atelectasis.    Coronary artery calcification.  Aortic atherosclerosis.    Incidentally noted 1.5 cm partially calcified left thyroid lobe nodule.  Further evaluation could be performed with dedicated thyroid ultrasound if not previously performed.    Additional incidental findings as above    Electronically signed by: Gino Richardson MD  Date:    09/19/2024  Time:    22:41  X-Ray Chest AP Portable  Narrative: EXAMINATION:  XR CHEST AP PORTABLE    CLINICAL HISTORY:  Sepsis;    TECHNIQUE:  Single frontal view of the chest was performed.    COMPARISON:  06/13/2024    FINDINGS:  The heart is not enlarged.  The lungs are well expanded and clear.  The costophrenic sulci are sharp.  There are median sternotomy sutures.  Orthopedic anchoring devices proximal right humerus.  Impression: No acute cardiopulmonary disease    Electronically signed by: Shruthi Finch MD  Date:    09/19/2024  Time:    18:15      ECHOCARDIOGRAM RESULTS (last 5)  Results for orders placed during the hospital encounter of 06/24/23    Echo    Interpretation Summary  · The left ventricle is normal in size with concentric remodeling and normal systolic function.  · The estimated ejection fraction is 55%.  · Limited echo no Doppler      Results for orders placed during the hospital encounter of 05/09/23    Echo Saline  Bubble? No    Interpretation Summary  · The left ventricle is normal in size with normal systolic function.  · The estimated ejection fraction is 75%.  · Normal left ventricular diastolic function.  · Atrial fibrillation not observed.  · Normal right ventricular size with normal right ventricular systolic function.  · Mild tricuspid regurgitation.  · Normal central venous pressure (3 mmHg).  · The estimated PA systolic pressure is 18 mmHg.  · Mild aortic sclerosis      CURRENT/PREVIOUS VISIT EKG  Results for orders placed or performed during the hospital encounter of 09/19/24   EKG 12-lead    Collection Time: 09/19/24  5:15 PM   Result Value Ref Range    QRS Duration 150 ms    [facility] QTC Calculation 472 ms    Narrative    Test Reason : A41.9,    Vent. Rate : 098 BPM     Atrial Rate : 098 BPM     P-R Int : 190 ms          QRS Dur : 150 ms      QT Int : 370 ms       P-R-T Axes : 055 -53 055 degrees     QTc Int : 472 ms    Normal sinus rhythm  Left axis deviation  Right bundle branch block  Abnormal ECG  When compared with ECG of 05-SEP-2023 14:30,  Premature ventricular complexes are no longer Present  The axis Shifted left    Referred By: [email or web address]   SELF           Confirmed By:            ASSESSMENT/PLAN:     Active Hospital Problems    Diagnosis    *COPD exacerbation    Weakness    Elevated troponin    Tobacco dependency       ASSESSMENT & PLAN:     COPD exacerbation  Febrile illness  Nondiagnostic troponin elevation likely due to demand ischemia  Exposure to COVID-19  Hypertensive urgency  CAD with history of CABG  Hypokalemia  Smoker      RECOMMENDATIONS:    Patient with nondiagnostic troponin elevation that is likely due to demand ischemia.  At this time she is in no acute distress.  Consideration for nuclear stress test when she is optimized from a respiratory standpoint and no longer febrile for at least 24 hours.  2D echocardiogram pending interpretation.  Continue metoprolol tartrate 25 mg p.o.  b.i.d..  Creatinine has improved since previous.  Would consider resuming home lisinopril and monitoring her labs closely.  Further recommendations based on hospital course.  Thank you for the consultation.      Geneva Holt NP  Date of Service: 09/20/2024  1:54 PM

## 2024-09-20 NOTE — SUBJECTIVE & OBJECTIVE
Past Medical History:   Diagnosis Date    Allergy     Anticoagulant long-term use     Diabetes mellitus     Diabetes mellitus, type 2     Disorder of kidney and ureter     Hyperlipidemia     Hypertension     Obesity     Peripheral vascular disease     Thyroid disease     Type 2 diabetes mellitus        Past Surgical History:   Procedure Laterality Date    ANGIOGRAM, EXTREMITY, UNILATERAL Right 9/6/2023    Procedure: ANGIOGRAM, EXTREMITY, UNILATERAL;  Surgeon: Cruz Armas MD;  Location: [facility] OR;  Service: Vascular;  Laterality: Right;    ANGIOGRAPHY OF LOWER EXTREMITY N/A 12/11/2019    Procedure: Angiogram Extremity Unilateral;  Surgeon: Cruz Armas MD;  Location: [facility] CATH/EP LAB;  Service: Cardiology;  Laterality: N/A;    ANGIOGRAPHY OF LOWER EXTREMITY Right 9/6/2023    Procedure: Angiogram Extremity Unilateral;  Surgeon: Cruz Armas MD;  Location: [facility] CATH/EP LAB;  Service: General;  Laterality: Right;    ANKLE HARDWARE REMOVAL Right 12/15/2022    Procedure: REMOVAL, HARDWARE, ANKLE;  Surgeon: Nae Jerry MD;  Location: [facility] OR;  Service: Orthopedics;  Laterality: Right;    CORONARY ARTERY BYPASS GRAFT      EMBOLECTOMY, LOWER EXTREMITY Right 9/6/2023    Procedure: EMBOLECTOMY, LOWER EXTREMITY;  Surgeon: Cruz Armas MD;  Location: [facility];  Service: Vascular;  Laterality: Right;    HYSTERECTOMY      partial    IRRIGATION AND DEBRIDEMENT OF LOWER EXTREMITY Right 12/15/2022    Procedure: IRRIGATION AND DEBRIDEMENT, LOWER EXTREMITY;  Surgeon: Nae Jerry MD;  Location: [facility] OR;  Service: Orthopedics;  Laterality: Right;    OPEN REDUCTION AND INTERNAL FIXATION (ORIF) OF INJURY OF ANKLE Right 7/20/2022    Procedure: ORIF, ANKLE- medial mallelous;  Surgeon: Nae Jerry MD;  Location: [facility] OR;  Service: Orthopedics;  Laterality: Right;    PERCUTANEOUS TRANSLUMINAL ANGIOPLASTY N/A 9/6/2023    Procedure: PTA (ANGIOPLASTY, PERCUTANEOUS, TRANSLUMINAL);  Surgeon: Cruz Armas MD;   Location: [facility] CATH/EP LAB;  Service: General;  Laterality: N/A;    PERIPHERAL ANGIOGRAPHY  12/11/2019       Review of patient's allergies indicates:   Allergen Reactions    Latex Rash    Tetracyclines Swelling       Current Facility-Administered Medications on File Prior to Encounter   Medication    gentian violet 1 % topical solution 0.5 mL     Current Outpatient Medications on File Prior to Encounter   Medication Sig    albuterol (PROVENTIL/VENTOLIN HFA) 90 mcg/actuation inhaler Inhale 2 puffs into the lungs every 6 (six) hours as needed for Shortness of Breath or Wheezing.    cilostazoL (PLETAL) 100 MG Tab Take 100 mg by mouth 2 (two) times daily.    clopidogreL (PLAVIX) 75 mg tablet Take 75 mg by mouth once daily.    FARXIGA 10 mg tablet Take 1 tablet by mouth once daily.    gabapentin (NEURONTIN) 100 MG capsule Take 100 mg by mouth 3 (three) times daily.    insulin glargine (LANTUS) 100 unit/mL injection Inject 35 Units into the skin 2 (two) times a day.    isosorbide mononitrate (IMDUR) 30 MG 24 hr tablet Take 30 mg by mouth once daily.    levothyroxine (SYNTHROID) 25 MCG tablet Take 25 mcg by mouth once daily.    lisinopriL (PRINIVIL,ZESTRIL) 20 MG tablet Take 20 mg by mouth once daily.    metoprolol tartrate (LOPRESSOR) 50 MG tablet Take 50 mg by mouth 2 (two) times daily.    nitroGLYCERIN (NITROSTAT) 0.3 MG SL tablet Place 0.3 mg under the tongue every 5 (five) minutes as needed for Chest pain.    NOVOLIN R REGULAR U-100 INSULN 100 unit/mL injection Inject 6 Units into the skin 2 (two) times daily before meals.    pantoprazole (PROTONIX) 40 MG tablet Take 40 mg by mouth once daily.    traMADoL (ULTRAM) 50 mg tablet Take 50 mg by mouth every 8 (eight) hours as needed for Pain.    amLODIPine (NORVASC) 5 MG tablet Take 5 mg by mouth once daily. (Patient not taking: Reported on 9/19/2024)    aspirin (ECOTRIN) 81 MG EC tablet Take 81 mg by mouth once daily.  (Patient not taking: Reported on 9/19/2024)     "DROPLET INSULIN SYRINGE 1 mL 31 gauge x 15/64" Syrg     insulin syringe-needle U-100 0.3 mL 30 gauge x 5/16" Syrg by Other route.    rosuvastatin (CRESTOR) 40 MG Tab Take 40 mg by mouth once daily. (Patient not taking: Reported on 9/19/2024)    TRUE METRIX GLUCOSE TEST STRIP Strp     TRUEPLUS LANCETS 30 gauge Misc      Family History       Problem Relation (Age of Onset)    Alzheimer's disease Mother    Diabetes Brother    Heart disease Father, Brother    No Known Problems Sister, Daughter, Son, Sister, Sister, Sister, Sister, Brother, Son, Son          Tobacco Use    Smoking status: Every Day     Current packs/day: 0.50     Average packs/day: 0.5 packs/day for 65.0 years (32.5 ttl pk-yrs)     Types: Cigarettes    Smokeless tobacco: Never    Tobacco comments:     Pt has a 65 year smoking history. Pt doesn't know at this time how much she smokes, it varies day to day. Currently has a patch on. [employer] member: [id number]    Substance and Sexual Activity    Alcohol use: No    Drug use: No    Sexual activity: Never     Review of Systems   All other systems reviewed and are negative.    Objective:     Vital Signs (Most Recent):  Temp: 98.8 °F (37.1 °C) (09/19/24 2012)  Pulse: 84 (09/20/24 0500)  Resp: 20 (09/19/24 2119)  BP: (!) 151/60 (09/20/24 0500)  SpO2: 95 % (09/20/24 0500) Vital Signs (24h Range):  Temp:  [98.8 °F (37.1 °C)-102.1 °F (38.9 °C)] 98.8 °F (37.1 °C)  Pulse:  [] 84  Resp:  [16-23] 20  SpO2:  [91 %-99 %] 95 %  BP: (103-201)/(42-82) 151/60     Weight: 117.9 kg (260 lb)  Body mass index is 39.53 kg/m².     Physical Exam  Vitals and nursing note reviewed.   Constitutional:       Appearance: Normal appearance.   HENT:      Head: Normocephalic.      Nose: Nose normal.      Mouth/Throat:      Mouth: Mucous membranes are moist.   Eyes:      Pupils: Pupils are equal, round, and reactive to light.   Cardiovascular:      Rate and Rhythm: Normal rate and regular rhythm.      Pulses: Normal pulses. " "  Pulmonary:      Effort: Pulmonary effort is normal.      Breath sounds: Wheezing present.   Abdominal:      General: Abdomen is flat. Bowel sounds are normal.      Palpations: Abdomen is soft.   Musculoskeletal:         General: Normal range of motion.      Cervical back: Normal range of motion.   Skin:     General: Skin is warm.      Capillary Refill: Capillary refill takes less than 2 seconds.   Neurological:      General: No focal deficit present.      Mental Status: She is alert.   Psychiatric:         Mood and Affect: Mood normal.              CRANIAL NERVES     CN III, IV, VI   Pupils are equal, round, and reactive to light.       Significant Labs: All pertinent labs within the past 24 hours have been reviewed.  CBC:   Recent Labs   Lab 09/19/24 1828   WBC 12.10   HGB 14.6   HCT 44.0        CMP:   Recent Labs   Lab 09/19/24 1828      K 3.4*      CO2 25   *   BUN 19   CREATININE 1.4   CALCIUM 9.0   PROT 6.2   ALBUMIN 3.5   BILITOT 0.7   ALKPHOS 58   AST 10   ALT 8*   ANIONGAP 8     Cardiac Markers:   Recent Labs   Lab 09/19/24  1828   *     Coagulation: No results for input(s): "PT", "INR", "APTT" in the last 48 hours.  Lactic Acid: No results for input(s): "LACTATE" in the last 48 hours.  Lipase: No results for input(s): "LIPASE" in the last 48 hours.  Magnesium:   Recent Labs   Lab 09/19/24  1828   MG 1.8     Troponin:   Recent Labs   Lab 09/19/24 1828 09/19/24  2329   TROPONINIHS 61.9* 81.8*     TSH: No results for input(s): "TSH" in the last 4320 hours.  Urine Studies:   Recent Labs   Lab 09/19/24  2356   COLORU Yellow   APPEARANCEUA Clear   PHUR 6.0   SPECGRAV >1.030*   PROTEINUA 1+*   GLUCUA 4+*   KETONESU Negative   BILIRUBINUA Negative   OCCULTUA 1+*   NITRITE Negative   UROBILINOGEN Negative   LEUKOCYTESUR 2+*   RBCUA 10*   WBCUA 11*   BACTERIA Occasional   SQUAMEPITHEL 1   HYALINECASTS 0       Significant Imaging: I have reviewed all pertinent imaging " results/findings within the past 24 hours.  I have reviewed and interpreted all pertinent imaging results/findings within the past 24 hours.

## 2024-09-21 LAB
ALBUMIN SERPL BCP-MCNC: 3.3 G/DL (ref 3.5–5.2)
ALP SERPL-CCNC: 59 U/L (ref 55–135)
ALT SERPL W/O P-5'-P-CCNC: 11 U/L (ref 10–44)
ANION GAP SERPL CALC-SCNC: 7 MMOL/L (ref 8–16)
AST SERPL-CCNC: 16 U/L (ref 10–40)
BASOPHILS # BLD AUTO: 0.01 K/UL (ref 0–0.2)
BASOPHILS NFR BLD: 0.1 % (ref 0–1.9)
BILIRUB SERPL-MCNC: 0.5 MG/DL (ref 0.1–1)
BUN SERPL-MCNC: 21 MG/DL (ref 8–23)
CALCIUM SERPL-MCNC: 9.3 MG/DL (ref 8.7–10.5)
CHLORIDE SERPL-SCNC: 108 MMOL/L (ref 95–110)
CO2 SERPL-SCNC: 28 MMOL/L (ref 23–29)
CREAT SERPL-MCNC: 1.1 MG/DL (ref 0.5–1.4)
DIFFERENTIAL METHOD BLD: ABNORMAL
EOSINOPHIL # BLD AUTO: 0 K/UL (ref 0–0.5)
EOSINOPHIL NFR BLD: 0 % (ref 0–8)
ERYTHROCYTE [DISTWIDTH] IN BLOOD BY AUTOMATED COUNT: 12.9 % (ref 11.5–14.5)
EST. GFR  (NO RACE VARIABLE): 50.2 ML/MIN/1.73 M^2
GLUCOSE SERPL-MCNC: 210 MG/DL (ref 70–110)
HCT VFR BLD AUTO: 42.2 % (ref 37–48.5)
HGB BLD-MCNC: 13.5 G/DL (ref 12–16)
IMM GRANULOCYTES # BLD AUTO: 0.05 K/UL (ref 0–0.04)
IMM GRANULOCYTES NFR BLD AUTO: 0.5 % (ref 0–0.5)
LYMPHOCYTES # BLD AUTO: 0.8 K/UL (ref 1–4.8)
LYMPHOCYTES NFR BLD: 7.7 % (ref 18–48)
MAGNESIUM SERPL-MCNC: 2.1 MG/DL (ref 1.6–2.6)
MCH RBC QN AUTO: 31.3 PG (ref 27–31)
MCHC RBC AUTO-ENTMCNC: 32 G/DL (ref 32–36)
MCV RBC AUTO: 98 FL (ref 82–98)
MONOCYTES # BLD AUTO: 0.5 K/UL (ref 0.3–1)
MONOCYTES NFR BLD: 4.7 % (ref 4–15)
NEUTROPHILS # BLD AUTO: 8.6 K/UL (ref 1.8–7.7)
NEUTROPHILS NFR BLD: 87 % (ref 38–73)
NRBC BLD-RTO: 0 /100 WBC
PLATELET # BLD AUTO: 206 K/UL (ref 150–450)
PMV BLD AUTO: 10.7 FL (ref 9.2–12.9)
POCT GLUCOSE: 157 MG/DL (ref 70–110)
POCT GLUCOSE: 233 MG/DL (ref 70–110)
POCT GLUCOSE: 243 MG/DL (ref 70–110)
POCT GLUCOSE: 273 MG/DL (ref 70–110)
POTASSIUM SERPL-SCNC: 4.3 MMOL/L (ref 3.5–5.1)
PROT SERPL-MCNC: 6.5 G/DL (ref 6–8.4)
RBC # BLD AUTO: 4.31 M/UL (ref 4–5.4)
SODIUM SERPL-SCNC: 143 MMOL/L (ref 136–145)
WBC # BLD AUTO: 9.94 K/UL (ref 3.9–12.7)

## 2024-09-21 PROCEDURE — 21400001 HC TELEMETRY ROOM

## 2024-09-21 PROCEDURE — 36415 COLL VENOUS BLD VENIPUNCTURE: CPT | Performed by: INTERNAL MEDICINE

## 2024-09-21 PROCEDURE — 25000242 PHARM REV CODE 250 ALT 637 W/ HCPCS: Performed by: EMERGENCY MEDICINE

## 2024-09-21 PROCEDURE — 96376 TX/PRO/DX INJ SAME DRUG ADON: CPT

## 2024-09-21 PROCEDURE — 80053 COMPREHEN METABOLIC PANEL: CPT | Performed by: INTERNAL MEDICINE

## 2024-09-21 PROCEDURE — 27000221 HC OXYGEN, UP TO 24 HOURS

## 2024-09-21 PROCEDURE — 25000003 PHARM REV CODE 250: Performed by: NURSE PRACTITIONER

## 2024-09-21 PROCEDURE — 85025 COMPLETE CBC W/AUTO DIFF WBC: CPT | Performed by: INTERNAL MEDICINE

## 2024-09-21 PROCEDURE — 96361 HYDRATE IV INFUSION ADD-ON: CPT

## 2024-09-21 PROCEDURE — 94761 N-INVAS EAR/PLS OXIMETRY MLT: CPT

## 2024-09-21 PROCEDURE — 94640 AIRWAY INHALATION TREATMENT: CPT

## 2024-09-21 PROCEDURE — 96366 THER/PROPH/DIAG IV INF ADDON: CPT

## 2024-09-21 PROCEDURE — 99233 SBSQ HOSP IP/OBS HIGH 50: CPT | Mod: ,,, | Performed by: INTERNAL MEDICINE

## 2024-09-21 PROCEDURE — 25000242 PHARM REV CODE 250 ALT 637 W/ HCPCS: Performed by: INTERNAL MEDICINE

## 2024-09-21 PROCEDURE — 83735 ASSAY OF MAGNESIUM: CPT | Performed by: INTERNAL MEDICINE

## 2024-09-21 PROCEDURE — 63600175 PHARM REV CODE 636 W HCPCS: Performed by: INTERNAL MEDICINE

## 2024-09-21 PROCEDURE — 25000003 PHARM REV CODE 250: Performed by: INTERNAL MEDICINE

## 2024-09-21 PROCEDURE — 99900031 HC PATIENT EDUCATION (STAT)

## 2024-09-21 RX ORDER — ENOXAPARIN SODIUM 100 MG/ML
40 INJECTION SUBCUTANEOUS EVERY 12 HOURS
Status: DISCONTINUED | OUTPATIENT
Start: 2024-09-21 | End: 2024-09-21

## 2024-09-21 RX ORDER — CLOPIDOGREL BISULFATE 75 MG/1
75 TABLET ORAL DAILY
Status: DISCONTINUED | OUTPATIENT
Start: 2024-09-21 | End: 2024-09-22 | Stop reason: HOSPADM

## 2024-09-21 RX ORDER — CILOSTAZOL 50 MG/1
100 TABLET ORAL 2 TIMES DAILY
Status: DISCONTINUED | OUTPATIENT
Start: 2024-09-21 | End: 2024-09-22 | Stop reason: HOSPADM

## 2024-09-21 RX ORDER — ENOXAPARIN SODIUM 100 MG/ML
40 INJECTION SUBCUTANEOUS EVERY 24 HOURS
Status: DISCONTINUED | OUTPATIENT
Start: 2024-09-21 | End: 2024-09-22 | Stop reason: HOSPADM

## 2024-09-21 RX ORDER — LEVOTHYROXINE SODIUM 25 UG/1
25 TABLET ORAL DAILY
Status: DISCONTINUED | OUTPATIENT
Start: 2024-09-21 | End: 2024-09-22 | Stop reason: HOSPADM

## 2024-09-21 RX ORDER — METOPROLOL TARTRATE 50 MG/1
50 TABLET ORAL 2 TIMES DAILY
Status: DISCONTINUED | OUTPATIENT
Start: 2024-09-21 | End: 2024-09-22 | Stop reason: HOSPADM

## 2024-09-21 RX ORDER — GABAPENTIN 100 MG/1
100 CAPSULE ORAL 3 TIMES DAILY
Status: DISCONTINUED | OUTPATIENT
Start: 2024-09-21 | End: 2024-09-22 | Stop reason: HOSPADM

## 2024-09-21 RX ADMIN — INSULIN ASPART 2 UNITS: 100 INJECTION, SOLUTION INTRAVENOUS; SUBCUTANEOUS at 08:09

## 2024-09-21 RX ADMIN — LEVOTHYROXINE SODIUM 25 MCG: 0.03 TABLET ORAL at 08:09

## 2024-09-21 RX ADMIN — CILOSTAZOL 100 MG: 50 TABLET ORAL at 08:09

## 2024-09-21 RX ADMIN — ENOXAPARIN SODIUM 120 MG: 120 INJECTION SUBCUTANEOUS at 08:09

## 2024-09-21 RX ADMIN — INSULIN ASPART 4 UNITS: 100 INJECTION, SOLUTION INTRAVENOUS; SUBCUTANEOUS at 08:09

## 2024-09-21 RX ADMIN — METHYLPREDNISOLONE SODIUM SUCCINATE 40 MG: 40 INJECTION, POWDER, FOR SOLUTION INTRAMUSCULAR; INTRAVENOUS at 05:09

## 2024-09-21 RX ADMIN — INSULIN GLARGINE 30 UNITS: 100 INJECTION, SOLUTION SUBCUTANEOUS at 08:09

## 2024-09-21 RX ADMIN — AZITHROMYCIN 500 MG: 500 INJECTION, POWDER, LYOPHILIZED, FOR SOLUTION INTRAVENOUS at 05:09

## 2024-09-21 RX ADMIN — GABAPENTIN 100 MG: 100 CAPSULE ORAL at 08:09

## 2024-09-21 RX ADMIN — SENNOSIDES AND DOCUSATE SODIUM 2 TABLET: 8.6; 5 TABLET ORAL at 08:09

## 2024-09-21 RX ADMIN — LISINOPRIL 20 MG: 20 TABLET ORAL at 08:09

## 2024-09-21 RX ADMIN — IPRATROPIUM BROMIDE AND ALBUTEROL SULFATE 3 ML: 2.5; .5 SOLUTION RESPIRATORY (INHALATION) at 02:09

## 2024-09-21 RX ADMIN — GABAPENTIN 100 MG: 100 CAPSULE ORAL at 02:09

## 2024-09-21 RX ADMIN — CEFTRIAXONE SODIUM 2 G: 2 INJECTION, POWDER, FOR SOLUTION INTRAMUSCULAR; INTRAVENOUS at 05:09

## 2024-09-21 RX ADMIN — METOPROLOL TARTRATE 50 MG: 50 TABLET, FILM COATED ORAL at 08:09

## 2024-09-21 RX ADMIN — FAMOTIDINE 20 MG: 10 INJECTION INTRAVENOUS at 09:09

## 2024-09-21 RX ADMIN — GUAIFENESIN 400 MG: 200 SOLUTION ORAL at 08:09

## 2024-09-21 RX ADMIN — IPRATROPIUM BROMIDE AND ALBUTEROL SULFATE 3 ML: 2.5; .5 SOLUTION RESPIRATORY (INHALATION) at 08:09

## 2024-09-21 RX ADMIN — ALUMINUM HYDROXIDE, MAGNESIUM HYDROXIDE, AND SIMETHICONE 30 ML: 1200; 120; 1200 SUSPENSION ORAL at 12:09

## 2024-09-21 RX ADMIN — GUAIFENESIN 400 MG: 200 SOLUTION ORAL at 02:09

## 2024-09-21 RX ADMIN — ASPIRIN 162 MG: 81 TABLET, COATED ORAL at 08:09

## 2024-09-21 RX ADMIN — IPRATROPIUM BROMIDE AND ALBUTEROL SULFATE 3 ML: .5; 3 SOLUTION RESPIRATORY (INHALATION) at 08:09

## 2024-09-21 RX ADMIN — INSULIN ASPART 6 UNITS: 100 INJECTION, SOLUTION INTRAVENOUS; SUBCUTANEOUS at 11:09

## 2024-09-21 RX ADMIN — CLOPIDOGREL BISULFATE 75 MG: 75 TABLET, FILM COATED ORAL at 08:09

## 2024-09-21 NOTE — NURSING
Nurses Note -- 4 Eyes      9/20/2024   10:54 PM      Skin assessed during: Admit      [x] No Altered Skin Integrity Present    [x]Prevention Measures Documented      [] Yes- Altered Skin Integrity Present or Discovered   [] LDA Added if Not in Epic (Describe Wound)   [] New Altered Skin Integrity was Present on Admit and Documented in LDA   [] Wound Image Taken    Wound Care Consulted? No    Attending Nurse:  Destin Oconnell RN/Staff Member:   [place]    Discoloration noted to lower extremities. Skin intact

## 2024-09-21 NOTE — PROGRESS NOTES
[facility]  Progress Note    Patient Name: Mary Cynthia Cryer  MRN: [id number]  Patient Class: IP- Inpatient   Admission Date: 9/19/2024  Length of Stay: 1 days  Attending Physician: Rohit Taipa MD  Primary Care Provider: Emmy Piña MD        Subjective:     Principal Problem:COPD exacerbation        HPI:  84 WF with PMHx DMII on insulin , CAD with hx of CABG 1995 ,  HLD, COPD not on oxygen at home , HTN, CKD, dementia, TIA, PVD presents to ED with cough for a couple of days with development of fever today per family.  Patient has seemed a little more confused to family and generally weak.  She has also complained of some shortness of breath with exertion.  Denies any chest pain, abdominal pain, nausea or vomiting, bowel changes or dysuria.  Did recently travel with family out of state and was exposed to persons that later tested positive for COVID-19.     Smoker   1 pack per day   Non drinker    Lives Alone       She says she just woke up feeling weak     Was fine night before     She noticed fever and 24 hours of cough that was new.  Non productive     No sick contracts    She has not noticed significant SOB  No chest pain         Otherwise nothing new she says         In ER her WBC normal but had Left shift 80% Neutrophils     Potassium was low   BUN CR up some but she has CKD     BNP mildly up  Troponin was up and going up on repeat higher   No chest pain   EKG normal       UA had some leuk esterase   but mild       CTA was done and negative for PE or PNA          Plan is to admit her to our service for the confusion , fever,  and elevated troponin    Overview/Hospital Course:  No notes on file    Interval History:  Patient was seen and examined at bedside this morning.  Her breathing is better today than yesterday.  Cardiogram was normal.  Stopping antibiotics, stopping steroids.  Labs better, stopping IV fluids.  If patient continues to improve, we will likely  discharge tomorrow.    Review of Systems   All other systems reviewed and are negative.    Objective:     Vital Signs (Most Recent):  Temp: 97.4 °F (36.3 °C) (09/21/24 0743)  Pulse: 98 (09/21/24 0805)  Resp: 19 (09/21/24 0805)  BP: (!) 195/86 (09/21/24 0743)  SpO2: (!) 86 % (09/21/24 0805) Vital Signs (24h Range):  Temp:  [97.4 °F (36.3 °C)-98.8 °F (37.1 °C)] 97.4 °F (36.3 °C)  Pulse:  [] 98  Resp:  [16-20] 19  SpO2:  [86 %-97 %] 86 %  BP: (136-214)/() 195/86     Weight: 111.7 kg (246 lb 4.1 oz)  Body mass index is 37.44 kg/m².    Intake/Output Summary (Last 24 hours) at 9/21/2024 0840  Last data filed at 9/21/2024 0439  Gross per 24 hour   Intake 370 ml   Output 700 ml   Net -330 ml         Physical Exam  Vitals and nursing note reviewed.   Constitutional:       Appearance: Normal appearance.   HENT:      Head: Normocephalic.      Nose: Nose normal.      Mouth/Throat:      Mouth: Mucous membranes are moist.   Eyes:      Pupils: Pupils are equal, round, and reactive to light.   Cardiovascular:      Rate and Rhythm: Normal rate and regular rhythm.      Pulses: Normal pulses.   Pulmonary:      Effort: Pulmonary effort is normal.      Breath sounds: Wheezing present.   Abdominal:      General: Abdomen is flat. Bowel sounds are normal.      Palpations: Abdomen is soft.   Musculoskeletal:         General: Normal range of motion.      Cervical back: Normal range of motion.   Skin:     General: Skin is warm.      Capillary Refill: Capillary refill takes less than 2 seconds.   Neurological:      General: No focal deficit present.      Mental Status: She is alert.   Psychiatric:         Mood and Affect: Mood normal.             Significant Labs: All pertinent labs within the past 24 hours have been reviewed.  CBC:   Recent Labs   Lab 09/19/24 1828 09/21/24  0413   WBC 12.10 9.94   HGB 14.6 13.5   HCT 44.0 42.2    206     CMP:   Recent Labs   Lab 09/19/24 1828 09/20/24  0714 09/21/24  0413    143  143   K 3.4* 3.4* 4.3    109 108   CO2 25 24 28   * 162* 210*   BUN 19 19 21   CREATININE 1.4 1.4 1.1   CALCIUM 9.0 8.6* 9.3   PROT 6.2 5.6* 6.5   ALBUMIN 3.5 3.0* 3.3*   BILITOT 0.7 0.6 0.5   ALKPHOS 58 49* 59   AST 10 9* 16   ALT 8* 7* 11   ANIONGAP 8 10 7*       Significant Imaging: I have reviewed all pertinent imaging results/findings within the past 24 hours.    Imaging Results              CTA Chest Non-Coronary (PE Studies) (Final result)  Result time 09/19/24 22:41:29      Final result by Gino Richardson MD (09/19/24 22:41:29)                   Impression:      No convincing evidence of pulmonary thromboembolism through the level of the proximal segmental arteries.    No large confluent airspace consolidation or evidence to suggest pulmonary infarct.  Mild bibasilar atelectasis.    Coronary artery calcification.  Aortic atherosclerosis.    Incidentally noted 1.5 cm partially calcified left thyroid lobe nodule.  Further evaluation could be performed with dedicated thyroid ultrasound if not previously performed.    Additional incidental findings as above      Electronically signed by: Gino Richardson MD  Date:    09/19/2024  Time:    22:41               Narrative:    EXAMINATION:  CTA CHEST NON CORONARY (PE STUDIES)    CLINICAL HISTORY:  Pulmonary embolism (PE) suspected, unknown D-dimer;    TECHNIQUE:  Low dose axial images, sagittal and coronal reformations were obtained from the thoracic inlet to the lung bases following the IV administration of 100 mL of Omnipaque 350.  Contrast timing was optimized to evaluate the pulmonary arteries.    COMPARISON:  Chest radiograph 09/19/2024    FINDINGS:  Visualized soft tissue structures at the base of the neck demonstrate a 1.5 cm partially calcified left thyroid lobe nodule.    The thoracic aorta maintains normal caliber, contour, and course with moderate atherosclerotic calcification within its course as well as involving the origins of the major  branch vessels.  There is no evidence of aneurysmal dilation or dissection.    The heart is not enlarged and there is no significant pericardial effusion.  There is 3 vessel coronary artery calcification.  The esophagus maintains a normal course and caliber. There is no bulky axillary or mediastinal lymph node enlargement.    There are a few suspected layering secretions within the distal trachea.  The proximal airways are patent.  Detailed evaluation of the lung parenchyma is limited by respiratory motion artifact. There is no pneumothorax. There is no large confluent airspace consolidation.  There is mild bibasilar atelectasis.  There is no significant volume of pleural fluid.    There is no large central saddle type pulmonary embolus or definite filling defect through the level of the proximal segmental arteries.    Limited images of the upper abdomen obtained during the course of this dedicated thoracic CT demonstrate no acute abnormalities.  There is mild chronic nodular thickening of the left adrenal gland.  No free intraperitoneal air within the upper abdomen.    The osseous structures demonstrate postoperative change of prior median sternotomy.  There are degenerative changes of visualized spine.  There is postoperative change of the right shoulder.                                       X-Ray Chest AP Portable (Final result)  Result time 09/19/24 18:15:46      Final result by Shruthi Finch MD (09/19/24 18:15:46)                   Impression:      No acute cardiopulmonary disease      Electronically signed by: Shruthi Finch MD  Date:    09/19/2024  Time:    18:15               Narrative:    EXAMINATION:  XR CHEST AP PORTABLE    CLINICAL HISTORY:  Sepsis;    TECHNIQUE:  Single frontal view of the chest was performed.    COMPARISON:  06/13/2024    FINDINGS:  The heart is not enlarged.  The lungs are well expanded and clear.  The costophrenic sulci are sharp.  There are median sternotomy sutures.   Orthopedic anchoring devices proximal right humerus.                                    echocardiogram       Left Ventricle: The left ventricle is normal in size. Mildly increased wall thickness. There is mild concentric hypertrophy. Normal wall motion. There is normal systolic function with a visually estimated ejection fraction of 55 - 60%. There is normal diastolic function.    Right Ventricle: Normal right ventricular cavity size. Wall thickness is normal. Systolic function is normal.    Left Atrium: Left atrium is mildly dilated.    Aortic Valve: The aortic valve is a trileaflet valve. There is moderate aortic valve sclerosis. Mildly calcified left cusp. Mildly restricted motion. There is mild stenosis. Aortic valve area by VTI is 1.62 cm². Aortic valve peak velocity is 2.01 m/s. Mean gradient is 9 mmHg. The dimensionless index is 0.52.    Mitral Valve: There is mild posterior leaflet sclerosis.    Pulmonary Artery: The estimated pulmonary artery systolic pressure is 19 mmHg.    IVC/SVC: Normal venous pressure at 3 mmHg.    Assessment/Plan:      * COPD exacerbation  Breathing improving   Patient does not like steroids, and at this point her breathing is improved enough that steroids may be held  Breathing worsens, we will change to oral steroids  She continues to improve, we will discharge tomorrow    Elevated troponin  Stable, demand ischemia    Weakness  Improved status post IV fluids      VTE Risk Mitigation (From admission, onward)           Ordered     enoxaparin injection 120 mg  Every 12 hours         09/20/24 0529     IP VTE HIGH RISK PATIENT  Once         09/20/24 0521     Place sequential compression device  Until discontinued         09/20/24 0521                    Discharge Planning   NITZA: 9/22/2024     Code Status: Full Code   Is the patient medically ready for discharge?:     Reason for patient still in hospital (select all that apply): Patient trending condition                     Roderick SOSA  DO Ted  Department of Hospital Medicine   [facility]

## 2024-09-21 NOTE — PROGRESS NOTES
[facility]  Department of Cardiology  Consult Note      PATIENT NAME: Mary Cynthia Cryer    MRN: [id number]  TODAY'S DATE: 09/21/2024  ADMIT DATE: 9/19/2024                          CONSULT REQUESTED BY: Rohit Tapia MD    SUBJECTIVE     PRINCIPAL PROBLEM: COPD exacerbation    9/21/2024:   Patient's blood pressure elevated overnight. No other significant events. Patient denies chest pain. She is currently undergoing treatment for COPD and UTI>     HPI:   Patient is an 84-year-old female who presented to the emergency room with complaints of cough and fever at home.  She had apparently been exposed to COVID-19 but did not test positive here.  Patient is being treated for COPD exacerbation.  She was noted to have a mild troponin elevation and does have a history of CAD with CABG in the past.  Patient does also have a history of hypertension, CKD, TIA, PVD, and dementia according to H&P.  Patient was seen lying in bed with no acute distress noted.  Breathing was stable and she was resting quietly.  She aroused easily to voice and had no complaints at this time.     REASON FOR CONSULT:  From Hospitalist H&P: 84 WF with PMHx DMII on insulin , CAD with hx of CABG 1995 ,  HLD, COPD not on oxygen at home , HTN, CKD, dementia, TIA, PVD presents to ED with cough for a couple of days with development of fever today per family.  Patient has seemed a little more confused to family and generally weak.  She has also complained of some shortness of breath with exertion.  Denies any chest pain, abdominal pain, nausea or vomiting, bowel changes or dysuria.  Did recently travel with family out of state and was exposed to persons that later tested positive for COVID-19.     Review of patient's allergies indicates:   Allergen Reactions    Latex Rash    Tetracyclines Swelling       Past Medical History:   Diagnosis Date    Allergy     Anticoagulant long-term use     Diabetes mellitus     Diabetes mellitus, type 2      Disorder of kidney and ureter     Hyperlipidemia     Hypertension     Obesity     Peripheral vascular disease     Thyroid disease     Type 2 diabetes mellitus      Past Surgical History:   Procedure Laterality Date    ANGIOGRAM, EXTREMITY, UNILATERAL Right 9/6/2023    Procedure: ANGIOGRAM, EXTREMITY, UNILATERAL;  Surgeon: Cruz Armas MD;  Location: [facility] OR;  Service: Vascular;  Laterality: Right;    ANGIOGRAPHY OF LOWER EXTREMITY N/A 12/11/2019    Procedure: Angiogram Extremity Unilateral;  Surgeon: Cruz Armas MD;  Location: [facility] CATH/EP LAB;  Service: Cardiology;  Laterality: N/A;    ANGIOGRAPHY OF LOWER EXTREMITY Right 9/6/2023    Procedure: Angiogram Extremity Unilateral;  Surgeon: Cruz Armas MD;  Location: [facility] CATH/EP LAB;  Service: General;  Laterality: Right;    ANKLE HARDWARE REMOVAL Right 12/15/2022    Procedure: REMOVAL, HARDWARE, ANKLE;  Surgeon: Nae Jerry MD;  Location: [facility] OR;  Service: Orthopedics;  Laterality: Right;    CORONARY ARTERY BYPASS GRAFT      EMBOLECTOMY, LOWER EXTREMITY Right 9/6/2023    Procedure: EMBOLECTOMY, LOWER EXTREMITY;  Surgeon: Cruz Armas MD;  Location: [facility];  Service: Vascular;  Laterality: Right;    HYSTERECTOMY      partial    IRRIGATION AND DEBRIDEMENT OF LOWER EXTREMITY Right 12/15/2022    Procedure: IRRIGATION AND DEBRIDEMENT, LOWER EXTREMITY;  Surgeon: Nae Jerry MD;  Location: [facility] OR;  Service: Orthopedics;  Laterality: Right;    OPEN REDUCTION AND INTERNAL FIXATION (ORIF) OF INJURY OF ANKLE Right 7/20/2022    Procedure: ORIF, ANKLE- medial mallelous;  Surgeon: Nae Jerry MD;  Location: [facility] OR;  Service: Orthopedics;  Laterality: Right;    PERCUTANEOUS TRANSLUMINAL ANGIOPLASTY N/A 9/6/2023    Procedure: PTA (ANGIOPLASTY, PERCUTANEOUS, TRANSLUMINAL);  Surgeon: Cruz Armas MD;  Location: [facility] CATH/EP LAB;  Service: General;  Laterality: N/A;    PERIPHERAL ANGIOGRAPHY  12/11/2019     Social History     Tobacco Use     Smoking status: Every Day     Current packs/day: 0.50     Average packs/day: 0.5 packs/day for 65.0 years (32.5 ttl pk-yrs)     Types: Cigarettes    Smokeless tobacco: Never    Tobacco comments:     Pt has a 65 year smoking history. Pt doesn't know at this time how much she smokes, it varies day to day. Currently has a patch on. [employer] member: [id number]    Substance Use Topics    Alcohol use: No    Drug use: No        REVIEW OF SYSTEMS  Per HPI    OBJECTIVE     VITAL SIGNS (Most Recent)  Temp: 98 °F (36.7 °C) (09/21/24 1124)  Pulse: 84 (09/21/24 1400)  Resp: 16 (09/21/24 1400)  BP: (!) 177/79 (09/21/24 1124)  SpO2: (!) 94 % (09/21/24 1400)    VENTILATION STATUS  Resp: 16 (09/21/24 1400)  SpO2: (!) 94 % (09/21/24 1400)           I & O (Last 24H):  Intake/Output Summary (Last 24 hours) at 9/21/2024 1446  Last data filed at 9/21/2024 1345  Gross per 24 hour   Intake 930 ml   Output 1100 ml   Net -170 ml       WEIGHTS  Wt Readings from Last 1 Encounters:   09/20/24 2007 111.7 kg (246 lb 4.1 oz)   09/19/24 1703 117.9 kg (260 lb)       PHYSICAL EXAM  CONSTITUTIONAL: No fever, no chills  HEENT: Normocephalic, atraumatic,pupils reactive to light                 NECK:  No JVD no carotid bruit  CVS: S1S2+, RRR  LUNGS: Clear  ABDOMEN: Soft, NT, BS+  EXTREMITIES: No cyanosis, edema  : No arrieta catheter  NEURO: AAO X 3  PSY: Normal affect      HOME MEDICATIONS:  No current facility-administered medications on file prior to encounter.     Current Outpatient Medications on File Prior to Encounter   Medication Sig Dispense Refill    albuterol (PROVENTIL/VENTOLIN HFA) 90 mcg/actuation inhaler Inhale 2 puffs into the lungs every 6 (six) hours as needed for Shortness of Breath or Wheezing.      cilostazoL (PLETAL) 100 MG Tab Take 100 mg by mouth 2 (two) times daily.      clopidogreL (PLAVIX) 75 mg tablet Take 75 mg by mouth once daily.      FARXIGA 10 mg tablet Take 1 tablet by mouth once daily.      gabapentin (NEURONTIN) 100  "MG capsule Take 100 mg by mouth 3 (three) times daily.      insulin glargine (LANTUS) 100 unit/mL injection Inject 35 Units into the skin 2 (two) times a day.      isosorbide mononitrate (IMDUR) 30 MG 24 hr tablet Take 30 mg by mouth once daily.      levothyroxine (SYNTHROID) 25 MCG tablet Take 25 mcg by mouth once daily.      lisinopriL (PRINIVIL,ZESTRIL) 20 MG tablet Take 20 mg by mouth once daily.      metoprolol tartrate (LOPRESSOR) 50 MG tablet Take 50 mg by mouth 2 (two) times daily.      nitroGLYCERIN (NITROSTAT) 0.3 MG SL tablet Place 0.3 mg under the tongue every 5 (five) minutes as needed for Chest pain.      NOVOLIN R REGULAR U-100 INSULN 100 unit/mL injection Inject 6 Units into the skin 2 (two) times daily before meals.      pantoprazole (PROTONIX) 40 MG tablet Take 40 mg by mouth once daily.      traMADoL (ULTRAM) 50 mg tablet Take 50 mg by mouth every 8 (eight) hours as needed for Pain.      amLODIPine (NORVASC) 5 MG tablet Take 5 mg by mouth once daily. (Patient not taking: Reported on 9/19/2024)      aspirin (ECOTRIN) 81 MG EC tablet Take 81 mg by mouth once daily.  (Patient not taking: Reported on 9/19/2024)      DROPLET INSULIN SYRINGE 1 mL 31 gauge x 15/64" Syrg       insulin syringe-needle U-100 0.3 mL 30 gauge x 5/16" Syrg by Other route.      rosuvastatin (CRESTOR) 40 MG Tab Take 40 mg by mouth once daily. (Patient not taking: Reported on 9/19/2024)      TRUE METRIX GLUCOSE TEST STRIP Strp       TRUEPLUS LANCETS 30 gauge Misc          SCHEDULED MEDS:   albuterol-ipratropium  3 mL Nebulization Q6H WAKE    aspirin  162 mg Oral Daily    azithromycin  500 mg Intravenous Q24H    cilostazoL  100 mg Oral BID    clopidogreL  75 mg Oral Daily    enoxparin  1 mg/kg Subcutaneous Q12H (prophylaxis, 0900/2100)    famotidine (PF)  20 mg Intravenous Daily    gabapentin  100 mg Oral TID    guaiFENesin 100 mg/5 ml  400 mg Oral TID    insulin glargine U-100  30 Units Subcutaneous Daily    levothyroxine  25 mcg " "Oral Daily    lisinopriL  20 mg Oral Daily    metoprolol tartrate  50 mg Oral BID    nicotine  1 patch Transdermal Daily    senna-docusate 8.6-50 mg  2 tablet Oral BID       CONTINUOUS INFUSIONS:    PRN MEDS:  Current Facility-Administered Medications:     acetaminophen, 650 mg, Oral, Q8H PRN    acetaminophen, 650 mg, Oral, Q4H PRN    albuterol-ipratropium, 3 mL, Nebulization, Q6H PRN    aluminum-magnesium hydroxide-simethicone, 30 mL, Oral, QID PRN    dextrose 50%, 12.5 g, Intravenous, PRN    dextrose 50%, 12.5 g, Intravenous, PRN    dextrose 50%, 25 g, Intravenous, PRN    glucagon (human recombinant), 1 mg, Intramuscular, PRN    glucose, 16 g, Oral, PRN    glucose, 24 g, Oral, PRN    insulin aspart U-100, 0-10 Units, Subcutaneous, Q6H PRN    magnesium oxide, 800 mg, Oral, PRN    magnesium oxide, 800 mg, Oral, PRN    melatonin, 6 mg, Oral, Nightly PRN    naloxone, 0.02 mg, Intravenous, PRN    nitroGLYCERIN, 0.4 mg, Sublingual, Q5 Min PRN    ondansetron, 4 mg, Intravenous, Q6H PRN    potassium bicarbonate, 35 mEq, Oral, PRN    potassium bicarbonate, 50 mEq, Oral, PRN    potassium bicarbonate, 60 mEq, Oral, PRN    potassium, sodium phosphates, 2 packet, Oral, PRN    potassium, sodium phosphates, 2 packet, Oral, PRN    potassium, sodium phosphates, 2 packet, Oral, PRN    sodium chloride 0.9%, 10 mL, Intravenous, Q12H PRN    LABS AND DIAGNOSTICS     CBC LAST 3 DAYS  Recent Labs   Lab 09/19/24  1828 09/21/24  0413   WBC 12.10 9.94   RBC 4.50 4.31   HGB 14.6 13.5   HCT 44.0 42.2   MCV 98 98   MCH 32.4* 31.3*   MCHC 33.2 32.0   RDW 13.3 12.9    206   MPV 10.7 10.7   GRAN 80.8*  9.8* 87.0*  8.6*   LYMPH 8.8*  1.1 7.7*  0.8*   MONO 9.4  1.1* 4.7  0.5   BASO 0.03 0.01   NRBC 0 0       COAGULATION LAST 3 DAYS  No results for input(s): "LABPT", "INR", "APTT" in the last 168 hours.    CHEMISTRY LAST 3 DAYS  Recent Labs   Lab 09/19/24  1828 09/20/24  0714 09/21/24  0413    143 143   K 3.4* 3.4* 4.3    " "109 108   CO2 25 24 28   ANIONGAP 8 10 7*   BUN 19 19 21   CREATININE 1.4 1.4 1.1   * 162* 210*   CALCIUM 9.0 8.6* 9.3   MG 1.8 1.8 2.1   ALBUMIN 3.5 3.0* 3.3*   PROT 6.2 5.6* 6.5   ALKPHOS 58 49* 59   ALT 8* 7* 11   AST 10 9* 16   BILITOT 0.7 0.6 0.5       CARDIAC PROFILE LAST 3 DAYS  Recent Labs   Lab 09/19/24 1828 09/19/24  2329 09/20/24  0714 09/20/24  1619   *  --   --   --    TROPONINIHS 61.9* 81.8* 69.3* 37.7*       ENDOCRINE LAST 3 DAYS  Recent Labs   Lab 09/19/24 1828 09/20/24  0714   TSH  --  1.204   PROCAL 0.635*  --        LAST ARTERIAL BLOOD GAS  ABG  No results for input(s): "PH", "PO2", "PCO2", "HCO3", "BE" in the last 168 hours.    LAST 7 DAYS MICROBIOLOGY   Microbiology Results (last 7 days)       Procedure Component Value Units Date/Time    Urine culture [[id number]] Collected: 09/19/24 2356    Order Status: Completed Specimen: Urine Updated: 09/21/24 0700     Urine Culture, Routine No growth to date    Narrative:      Specimen Source->Urine    Respiratory Infection Panel (PCR), Nasopharyngeal [[id number]] Collected: 09/20/24 1446    Order Status: Completed Specimen: Nasopharyngeal Swab Updated: 09/20/24 2054     Respiratory Infection Panel Source NP swab     Adenovirus Not Detected     Coronavirus 229E, Common Cold Virus Not Detected     Coronavirus HKU1, Common Cold Virus Not Detected     Coronavirus NL63, Common Cold Virus Not Detected     Coronavirus OC43, Common Cold Virus Not Detected     Comment: Coronavirus strains 229E, HKU1, NL63, and OC43 can cause the common   cold   and are not associated with the respiratory disease outbreak caused   by  the COVID-19 (SARS-CoV-2 novel Coronavirus) strain.           SARS-CoV2 (COVID-19) Qualitative PCR Not Detected     Human Metapneumovirus Not Detected     Human Rhinovirus/Enterovirus Not Detected     Influenza A (subtypes H1, H1-2009,H3) Not Detected     Influenza B Not Detected     Parainfluenza Virus 1 Not Detected     Parainfluenza " Virus 2 Not Detected     Parainfluenza Virus 3 Not Detected     Parainfluenza Virus 4 Not Detected     Respiratory Syncytial Virus Not Detected     Bordetella Parapertussis ([id number]) Not Detected     Bordetella pertussis (ptxP) Not Detected     Chlamydia pneumoniae Not Detected     Mycoplasma pneumoniae Not Detected     Comment: Respiratory Infection Panel testing performed by Multiplex PCR.       Narrative:      Respiratory Infection Panel source->NP Swab    Blood culture x two cultures. Draw prior to antibiotics. [[id number]] Collected: 09/19/24 1829    Order Status: Completed Specimen: Blood from Peripheral, Antecubital, Left Updated: 09/20/24 2032     Blood Culture, Routine No Growth to date      No Growth to date    Narrative:      Aerobic and anaerobic    Blood culture x two cultures. Draw prior to antibiotics. [[id number]] Collected: 09/19/24 1839    Order Status: Completed Specimen: Blood Updated: 09/20/24 2032     Blood Culture, Routine No Growth to date      No Growth to date    Narrative:      Aerobic and anaerobic            MOST RECENT IMAGING  Echo Saline Bubble? No    Left Ventricle: The left ventricle is normal in size. Mildly increased   wall thickness. There is mild concentric hypertrophy. Normal wall motion.   There is normal systolic function with a visually estimated ejection   fraction of 55 - 60%. There is normal diastolic function.    Right Ventricle: Normal right ventricular cavity size. Wall thickness   is normal. Systolic function is normal.    Left Atrium: Left atrium is mildly dilated.    Aortic Valve: The aortic valve is a trileaflet valve. There is moderate   aortic valve sclerosis. Mildly calcified left cusp. Mildly restricted   motion. There is mild stenosis. Aortic valve area by VTI is 1.62 cm².   Aortic valve peak velocity is 2.01 m/s. Mean gradient is 9 mmHg. The   dimensionless index is 0.52.    Mitral Valve: There is mild posterior leaflet sclerosis.    Pulmonary Artery: The  estimated pulmonary artery systolic pressure is   19 mmHg.    IVC/SVC: Normal venous pressure at 3 mmHg.      ECHOCARDIOGRAM RESULTS (last 5)  Results for orders placed during the hospital encounter of 09/19/24    Echo Saline Bubble? No    Interpretation Summary    Left Ventricle: The left ventricle is normal in size. Mildly increased wall thickness. There is mild concentric hypertrophy. Normal wall motion. There is normal systolic function with a visually estimated ejection fraction of 55 - 60%. There is normal diastolic function.    Right Ventricle: Normal right ventricular cavity size. Wall thickness is normal. Systolic function is normal.    Left Atrium: Left atrium is mildly dilated.    Aortic Valve: The aortic valve is a trileaflet valve. There is moderate aortic valve sclerosis. Mildly calcified left cusp. Mildly restricted motion. There is mild stenosis. Aortic valve area by VTI is 1.62 cm². Aortic valve peak velocity is 2.01 m/s. Mean gradient is 9 mmHg. The dimensionless index is 0.52.    Mitral Valve: There is mild posterior leaflet sclerosis.    Pulmonary Artery: The estimated pulmonary artery systolic pressure is 19 mmHg.    IVC/SVC: Normal venous pressure at 3 mmHg.      Results for orders placed during the hospital encounter of 06/24/23    Echo    Interpretation Summary  · The left ventricle is normal in size with concentric remodeling and normal systolic function.  · The estimated ejection fraction is 55%.  · Limited echo no Doppler      Results for orders placed during the hospital encounter of 05/09/23    Echo Saline Bubble? No    Interpretation Summary  · The left ventricle is normal in size with normal systolic function.  · The estimated ejection fraction is 75%.  · Normal left ventricular diastolic function.  · Atrial fibrillation not observed.  · Normal right ventricular size with normal right ventricular systolic function.  · Mild tricuspid regurgitation.  · Normal central venous pressure (3  mmHg).  · The estimated PA systolic pressure is 18 mmHg.  · Mild aortic sclerosis      CURRENT/PREVIOUS VISIT EKG  Results for orders placed or performed during the hospital encounter of 09/19/24   EKG 12-lead    Collection Time: 09/20/24  3:57 PM   Result Value Ref Range    QRS Duration 146 ms    [facility] QTC Calculation 535 ms    Narrative    Test Reason : R07.9,    Vent. Rate : 110 BPM     Atrial Rate : 110 BPM     P-R Int : 168 ms          QRS Dur : 146 ms      QT Int : 396 ms       P-R-T Axes : -28 -78 043 degrees     QTc Int : 535 ms    Sinus tachycardia with Premature atrial complexes with Aberrant conduction  Left axis deviation  Right bundle branch block  Abnormal ECG  When compared with ECG of 19-SEP-2024 17:15,  Aberrant conduction is now Present  QT has lengthened    Referred By: [email or web address]   SELF           Confirmed By:            ASSESSMENT/PLAN:     Active Hospital Problems    Diagnosis    *COPD exacerbation    Weakness    Elevated troponin    Tobacco dependency       ASSESSMENT & PLAN:   COPD exacerbation  Febrile illness  Nondiagnostic troponin elevation likely due to demand ischemia  Exposure to COVID-19  Hypertensive urgency  CAD with history of CABG  Hypokalemia  Smoker         RECOMMENDATIONS:    Patient with nondiagnostic troponin elevation that is likely due to demand ischemia. Her TTE reveals preserved EF with no wall motion abnormalities.  She denies anginal or congestive symptoms.  Patient is followed closely by Dr. Lopez.  Recommend outpatient ischemic evaluation.   From cardiac standpoint, it is ok to transition to DVT prophylaxis dosing of lovenox.   3. If blood pressure continues to be elevated, consider increasing lisinopril to 40mg po qday.     Cardiology team will sign off. Please feel free to re-consult us if needed or call with questions or concerns. Thank you for allowing us to participate in the care of this geoffrey patient.     Vicky Ray MD  Date of Service: 09/21/2024  2:46 PM

## 2024-09-21 NOTE — SUBJECTIVE & OBJECTIVE
Interval History:     Review of Systems   All other systems reviewed and are negative.    Objective:     Vital Signs (Most Recent):  Temp: 97.4 °F (36.3 °C) (09/21/24 0743)  Pulse: 98 (09/21/24 0805)  Resp: 19 (09/21/24 0805)  BP: (!) 195/86 (09/21/24 0743)  SpO2: (!) 86 % (09/21/24 0805) Vital Signs (24h Range):  Temp:  [97.4 °F (36.3 °C)-98.8 °F (37.1 °C)] 97.4 °F (36.3 °C)  Pulse:  [] 98  Resp:  [16-20] 19  SpO2:  [86 %-97 %] 86 %  BP: (136-214)/() 195/86     Weight: 111.7 kg (246 lb 4.1 oz)  Body mass index is 37.44 kg/m².    Intake/Output Summary (Last 24 hours) at 9/21/2024 0840  Last data filed at 9/21/2024 0439  Gross per 24 hour   Intake 370 ml   Output 700 ml   Net -330 ml         Physical Exam  Vitals and nursing note reviewed.   Constitutional:       Appearance: Normal appearance.   HENT:      Head: Normocephalic.      Nose: Nose normal.      Mouth/Throat:      Mouth: Mucous membranes are moist.   Eyes:      Pupils: Pupils are equal, round, and reactive to light.   Cardiovascular:      Rate and Rhythm: Normal rate and regular rhythm.      Pulses: Normal pulses.   Pulmonary:      Effort: Pulmonary effort is normal.      Breath sounds: Wheezing present.   Abdominal:      General: Abdomen is flat. Bowel sounds are normal.      Palpations: Abdomen is soft.   Musculoskeletal:         General: Normal range of motion.      Cervical back: Normal range of motion.   Skin:     General: Skin is warm.      Capillary Refill: Capillary refill takes less than 2 seconds.   Neurological:      General: No focal deficit present.      Mental Status: She is alert.   Psychiatric:         Mood and Affect: Mood normal.             Significant Labs: All pertinent labs within the past 24 hours have been reviewed.  CBC:   Recent Labs   Lab 09/19/24 1828 09/21/24  0413   WBC 12.10 9.94   HGB 14.6 13.5   HCT 44.0 42.2    206     CMP:   Recent Labs   Lab 09/19/24 1828 09/20/24  0714 09/21/24  0413    143 143    K 3.4* 3.4* 4.3    109 108   CO2 25 24 28   * 162* 210*   BUN 19 19 21   CREATININE 1.4 1.4 1.1   CALCIUM 9.0 8.6* 9.3   PROT 6.2 5.6* 6.5   ALBUMIN 3.5 3.0* 3.3*   BILITOT 0.7 0.6 0.5   ALKPHOS 58 49* 59   AST 10 9* 16   ALT 8* 7* 11   ANIONGAP 8 10 7*       Significant Imaging: I have reviewed all pertinent imaging results/findings within the past 24 hours.    Imaging Results              CTA Chest Non-Coronary (PE Studies) (Final result)  Result time 09/19/24 22:41:29      Final result by Gino Richardsno MD (09/19/24 22:41:29)                   Impression:      No convincing evidence of pulmonary thromboembolism through the level of the proximal segmental arteries.    No large confluent airspace consolidation or evidence to suggest pulmonary infarct.  Mild bibasilar atelectasis.    Coronary artery calcification.  Aortic atherosclerosis.    Incidentally noted 1.5 cm partially calcified left thyroid lobe nodule.  Further evaluation could be performed with dedicated thyroid ultrasound if not previously performed.    Additional incidental findings as above      Electronically signed by: Gino Richardson MD  Date:    09/19/2024  Time:    22:41               Narrative:    EXAMINATION:  CTA CHEST NON CORONARY (PE STUDIES)    CLINICAL HISTORY:  Pulmonary embolism (PE) suspected, unknown D-dimer;    TECHNIQUE:  Low dose axial images, sagittal and coronal reformations were obtained from the thoracic inlet to the lung bases following the IV administration of 100 mL of Omnipaque 350.  Contrast timing was optimized to evaluate the pulmonary arteries.    COMPARISON:  Chest radiograph 09/19/2024    FINDINGS:  Visualized soft tissue structures at the base of the neck demonstrate a 1.5 cm partially calcified left thyroid lobe nodule.    The thoracic aorta maintains normal caliber, contour, and course with moderate atherosclerotic calcification within its course as well as involving the origins of the major  branch vessels.  There is no evidence of aneurysmal dilation or dissection.    The heart is not enlarged and there is no significant pericardial effusion.  There is 3 vessel coronary artery calcification.  The esophagus maintains a normal course and caliber. There is no bulky axillary or mediastinal lymph node enlargement.    There are a few suspected layering secretions within the distal trachea.  The proximal airways are patent.  Detailed evaluation of the lung parenchyma is limited by respiratory motion artifact. There is no pneumothorax. There is no large confluent airspace consolidation.  There is mild bibasilar atelectasis.  There is no significant volume of pleural fluid.    There is no large central saddle type pulmonary embolus or definite filling defect through the level of the proximal segmental arteries.    Limited images of the upper abdomen obtained during the course of this dedicated thoracic CT demonstrate no acute abnormalities.  There is mild chronic nodular thickening of the left adrenal gland.  No free intraperitoneal air within the upper abdomen.    The osseous structures demonstrate postoperative change of prior median sternotomy.  There are degenerative changes of visualized spine.  There is postoperative change of the right shoulder.                                       X-Ray Chest AP Portable (Final result)  Result time 09/19/24 18:15:46      Final result by Shruthi Finch MD (09/19/24 18:15:46)                   Impression:      No acute cardiopulmonary disease      Electronically signed by: Shruthi Finch MD  Date:    09/19/2024  Time:    18:15               Narrative:    EXAMINATION:  XR CHEST AP PORTABLE    CLINICAL HISTORY:  Sepsis;    TECHNIQUE:  Single frontal view of the chest was performed.    COMPARISON:  06/13/2024    FINDINGS:  The heart is not enlarged.  The lungs are well expanded and clear.  The costophrenic sulci are sharp.  There are median sternotomy sutures.   Orthopedic anchoring devices proximal right humerus.                                    echocardiogram       Left Ventricle: The left ventricle is normal in size. Mildly increased wall thickness. There is mild concentric hypertrophy. Normal wall motion. There is normal systolic function with a visually estimated ejection fraction of 55 - 60%. There is normal diastolic function.    Right Ventricle: Normal right ventricular cavity size. Wall thickness is normal. Systolic function is normal.    Left Atrium: Left atrium is mildly dilated.    Aortic Valve: The aortic valve is a trileaflet valve. There is moderate aortic valve sclerosis. Mildly calcified left cusp. Mildly restricted motion. There is mild stenosis. Aortic valve area by VTI is 1.62 cm². Aortic valve peak velocity is 2.01 m/s. Mean gradient is 9 mmHg. The dimensionless index is 0.52.    Mitral Valve: There is mild posterior leaflet sclerosis.    Pulmonary Artery: The estimated pulmonary artery systolic pressure is 19 mmHg.    IVC/SVC: Normal venous pressure at 3 mmHg.

## 2024-09-21 NOTE — PLAN OF CARE
09/21/24 0953   Medicare Message   Important Message from Medicare regarding Discharge Appeal Rights Given to patient/caregiver;Explained to patient/caregiver;Signed/date by patient/caregiver   Date IMM was signed 09/21/24   Time IMM was signed 0908

## 2024-09-21 NOTE — PROGRESS NOTES
Weight base Dosing of Medication    An order has been entered by a pharmacist to adjust the dose and/or frequency of the medication listed below based on the patient's renal function.    Objective:  84 y.o., female, Actual Body Weight = 111.7 kg (246 lb 4.1 oz)    Current Regimen:  Lovenox 40mg q12h    Assessment:  Estimated Creatinine Clearance: 51.7 mL/min (based on SCr of 1.1 mg/dL).  Renal function is okay.    BMI 37<40    Plan:  Orders have been entered to adjust the dose and/or frequency based on the patient's weight and renal function:  Lovenox 40 mg SQ every 24 hours.    Thank you for allowing us to participate in this patient's care.     Sterling Graves 9/21/2024 2:56 PM  Department of Pharmacy  Ext [phone]

## 2024-09-21 NOTE — RESPIRATORY THERAPY
09/20/24 8243   Patient Assessment/Suction   Level of Consciousness (AVPU) alert   Respiratory Effort Normal;Unlabored   Expansion/Accessory Muscles/Retractions no retractions;no use of accessory muscles   All Lung Fields Breath Sounds wheezes, expiratory   Rhythm/Pattern, Respiratory pattern regular;unlabored   Cough Frequency infrequent   Cough Type good;nonproductive   PRE-TX-O2   Device (Oxygen Therapy) room air   SpO2 (!) 91 %   Pulse Oximetry Type Intermittent   $ Pulse Oximetry - Multiple Charge Pulse Oximetry - Multiple   Pulse 95   Resp 16   Aerosol Therapy   $ Aerosol Therapy Charges Aerosol Treatment   Daily Review of Necessity (SVN) completed   Respiratory Treatment Status (SVN) given   Treatment Route (SVN) mask;oxygen   Patient Position HOB elevated   Post Treatment Assessment (SVN) breath sounds unchanged   Signs of Intolerance (SVN) none   Education   $ Education Bronchodilator;15 min

## 2024-09-21 NOTE — PLAN OF CARE
Case Management Assessment     PCP: Dr Emmy Piña    Patient Arrived From: Home    Existing Help at Home: Self, Independent     rBenda Kent (Friend)  [phone] (Mobile)     Barriers to Discharge: None    Discharge Plan:    A. Home w/family   B. Home    Patient AAOX3, independent at baseline. PCP correct on facesheet. Independent in ADL's. Home DME-cane, RW, wheelchair. Family to provide transportation home.      09/21/24 0954   Discharge Assessment   Assessment Type Discharge Planning Assessment   Confirmed/corrected address, phone number and insurance Yes   Confirmed Demographics Correct on Facesheet   Source of Information patient   Communicated NITZA with patient/caregiver Date not available/Unable to determine   People in Home alone   Do you expect to return to your current living situation? Yes   Do you have help at home or someone to help you manage your care at home? Yes   Who are your caregiver(s) and their phone number(s)? Brenda Kent (Friend)  [phone] (Mobile)   Prior to hospitilization cognitive status: Alert/Oriented   Current cognitive status: Alert/Oriented   Walking or Climbing Stairs Difficulty no   Dressing/Bathing Difficulty no   Equipment Currently Used at Home walker, rolling;cane, straight;wheelchair   Readmission within 30 days? No   Do you currently have service(s) that help you manage your care at home? No   Do you take prescription medications? Yes   Do you have prescription coverage? Yes   Do you have any problems affording any of your prescribed medications? No   Is the patient taking medications as prescribed? yes   Who is going to help you get home at discharge? Brenda Kent (Friend)  [phone] (Mobile)   How do you get to doctors appointments? family or friend will provide   Are you on dialysis? No   Do you take coumadin? No   Discharge Plan A Home with family   Discharge Plan B Home   DME Needed Upon Discharge  none   Discharge Plan discussed with: Patient    Transition of Care Barriers None   Physical Activity   On average, how many days per week do you engage in moderate to strenuous exercise (like a brisk walk)? 0 days   On average, how many minutes do you engage in exercise at this level? 0 min   Financial Resource Strain   How hard is it for you to pay for the very basics like food, housing, medical care, and heating? Not hard   Housing Stability   In the last 12 months, was there a time when you were not able to pay the mortgage or rent on time? N   At any time in the past 12 months, were you homeless or living in a shelter (including now)? N   Transportation Needs   Has the lack of transportation kept you from medical appointments, meetings, work or from getting things needed for daily living? No   Food Insecurity   Within the past 12 months, you worried that your food would run out before you got the money to buy more. Never true   Within the past 12 months, the food you bought just didn't last and you didn't have money to get more. Never true   Stress   Do you feel stress - tense, restless, nervous, or anxious, or unable to sleep at night because your mind is troubled all the time - these days? Not at all   Social Isolation   How often do you feel lonely or isolated from those around you?  Never   Alcohol Use   Q1: How often do you have a drink containing alcohol? Never   Q2: How many drinks containing alcohol do you have on a typical day when you are drinking? None   Q3: How often do you have six or more drinks on one occasion? Never   Utilities   In the past 12 months has the electric, gas, oil, or water company threatened to shut off services in your home? No   Health Literacy   How often do you need to have someone help you when you read instructions, pamphlets, or other written material from your doctor or pharmacy? Sometimes     [facility]  Initial Discharge Assessment       Primary Care Provider: Emmy Piña MD    Admission  Diagnosis: Sepsis [A41.9]    Admission Date: 9/19/2024  Expected Discharge Date: 9/22/2024    Transition of Care Barriers: (P) None    Payor: [employer] MANAGED MEDICARE / Plan: [employer] MEDICARE PPO / Product Type: Medicare Advantage /     Extended Emergency Contact Information  Primary Emergency Contact: Brenda Kent  Mobile Phone: [phone]  Relation: Friend    Discharge Plan A: (P) Home with family  Discharge Plan B: (P) Home      [facility] [place] - [address]  [address]  [address]  [place] [address]  Phone: [phone] Fax: [phone]      Initial Assessment (most recent)       Adult Discharge Assessment - 09/21/24 0954          Discharge Assessment    Assessment Type Discharge Planning Assessment (P)      Confirmed/corrected address, phone number and insurance Yes (P)      Confirmed Demographics Correct on Facesheet (P)      Source of Information patient (P)      Communicated NITZA with patient/caregiver Date not available/Unable to determine (P)      People in Home alone (P)      Do you expect to return to your current living situation? Yes (P)      Do you have help at home or someone to help you manage your care at home? Yes (P)      Who are your caregiver(s) and their phone number(s)? Brenda Kent (Friend)  [phone] (Mobile) (P)      Prior to hospitilization cognitive status: Alert/Oriented (P)      Current cognitive status: Alert/Oriented (P)      Walking or Climbing Stairs Difficulty no (P)      Dressing/Bathing Difficulty no (P)      Equipment Currently Used at Home walker, rolling;cane, straight;wheelchair (P)      Readmission within 30 days? No (P)      Do you currently have service(s) that help you manage your care at home? No (P)      Do you take prescription medications? Yes (P)      Do you have prescription coverage? Yes (P)      Do you have any problems affording any of your prescribed medications? No (P)      Is the patient taking medications as prescribed? yes (P)       Who is going to help you get home at discharge? Brenda Kent (Friend)  [phone] (Mobile) (P)      How do you get to doctors appointments? family or friend will provide (P)      Are you on dialysis? No (P)      Do you take coumadin? No (P)      Discharge Plan A Home with family (P)      Discharge Plan B Home (P)      DME Needed Upon Discharge  none (P)      Discharge Plan discussed with: Patient (P)      Transition of Care Barriers None (P)         Physical Activity    On average, how many days per week do you engage in moderate to strenuous exercise (like a brisk walk)? 0 days (P)      On average, how many minutes do you engage in exercise at this level? 0 min (P)         Financial Resource Strain    How hard is it for you to pay for the very basics like food, housing, medical care, and heating? Not hard at all (P)         Housing Stability    In the last 12 months, was there a time when you were not able to pay the mortgage or rent on time? No (P)      At any time in the past 12 months, were you homeless or living in a shelter (including now)? No (P)         Transportation Needs    Has the lack of transportation kept you from medical appointments, meetings, work or from getting things needed for daily living? No (P)         Food Insecurity    Within the past 12 months, you worried that your food would run out before you got the money to buy more. Never true (P)      Within the past 12 months, the food you bought just didn't last and you didn't have money to get more. Never true (P)         Stress    Do you feel stress - tense, restless, nervous, or anxious, or unable to sleep at night because your mind is troubled all the time - these days? Not at all (P)         Social Isolation    How often do you feel lonely or isolated from those around you?  Never (P)         Alcohol Use    Q1: How often do you have a drink containing alcohol? Never (P)      Q2: How many drinks containing alcohol do you have on a  typical day when you are drinking? Patient does not drink (P)      Q3: How often do you have six or more drinks on one occasion? Never (P)         Utilities    In the past 12 months has the electric, gas, oil, or water company threatened to shut off services in your home? No (P)         Health Literacy    How often do you need to have someone help you when you read instructions, pamphlets, or other written material from your doctor or pharmacy? Sometimes (P)

## 2024-09-21 NOTE — CARE UPDATE
09/21/24 0805   Patient Assessment/Suction   Level of Consciousness (AVPU) alert   Respiratory Effort Slight;Labored   Expansion/Accessory Muscles/Retractions no use of accessory muscles;no retractions   All Lung Fields Breath Sounds Anterior:   KATELYN Breath Sounds wheezes, expiratory   RUL Breath Sounds wheezes, expiratory   Rhythm/Pattern, Respiratory pattern regular   Cough Frequency infrequent   Cough Type fair   PRE-TX-O2   Device (Oxygen Therapy) room air;nasal cannula   $ Is the patient on Low Flow Oxygen? Yes   Flow (L/min) (Oxygen Therapy) 2  (put on NC 2l due to sats 85)   SpO2 (!) 86 %   Pulse Oximetry Type Intermittent   $ Pulse Oximetry - Multiple Charge Pulse Oximetry - Multiple   Pulse 98   Resp 19   Aerosol Therapy   $ Aerosol Therapy Charges Aerosol Treatment   Daily Review of Necessity (SVN) completed   Respiratory Treatment Status (SVN) given   Treatment Route (SVN) mask;oxygen   Patient Position HOB elevated   Post Treatment Assessment (SVN) increased aeration   Signs of Intolerance (SVN) none   Breath Sounds Post-Respiratory Treatment   Throughout All Fields Post-Treatment All Fields   Throughout All Fields Post-Treatment aeration increased   Post-treatment Heart Rate (beats/min) 91   Post-treatment Resp Rate (breaths/min) 20   Education   $ Education Bronchodilator;15 min

## 2024-09-22 VITALS
DIASTOLIC BLOOD PRESSURE: 71 MMHG | OXYGEN SATURATION: 87 % | TEMPERATURE: 98 F | HEART RATE: 88 BPM | SYSTOLIC BLOOD PRESSURE: 187 MMHG | HEIGHT: 68 IN | BODY MASS INDEX: 37.65 KG/M2 | RESPIRATION RATE: 18 BRPM | WEIGHT: 248.44 LBS

## 2024-09-22 LAB
ALBUMIN SERPL BCP-MCNC: 3 G/DL (ref 3.5–5.2)
ALP SERPL-CCNC: 53 U/L (ref 55–135)
ALT SERPL W/O P-5'-P-CCNC: 13 U/L (ref 10–44)
ANION GAP SERPL CALC-SCNC: 5 MMOL/L (ref 8–16)
AST SERPL-CCNC: 16 U/L (ref 10–40)
BACTERIA UR CULT: NO GROWTH
BASOPHILS # BLD AUTO: 0.02 K/UL (ref 0–0.2)
BASOPHILS NFR BLD: 0.2 % (ref 0–1.9)
BILIRUB SERPL-MCNC: 0.5 MG/DL (ref 0.1–1)
BUN SERPL-MCNC: 22 MG/DL (ref 8–23)
CALCIUM SERPL-MCNC: 9 MG/DL (ref 8.7–10.5)
CHLORIDE SERPL-SCNC: 108 MMOL/L (ref 95–110)
CO2 SERPL-SCNC: 29 MMOL/L (ref 23–29)
CREAT SERPL-MCNC: 1.2 MG/DL (ref 0.5–1.4)
DIFFERENTIAL METHOD BLD: ABNORMAL
EOSINOPHIL # BLD AUTO: 0 K/UL (ref 0–0.5)
EOSINOPHIL NFR BLD: 0.3 % (ref 0–8)
ERYTHROCYTE [DISTWIDTH] IN BLOOD BY AUTOMATED COUNT: 13.2 % (ref 11.5–14.5)
EST. GFR  (NO RACE VARIABLE): 45.2 ML/MIN/1.73 M^2
GLUCOSE SERPL-MCNC: 157 MG/DL (ref 70–110)
HCT VFR BLD AUTO: 40.8 % (ref 37–48.5)
HGB BLD-MCNC: 13.4 G/DL (ref 12–16)
IMM GRANULOCYTES # BLD AUTO: 0.05 K/UL (ref 0–0.04)
IMM GRANULOCYTES NFR BLD AUTO: 0.5 % (ref 0–0.5)
LYMPHOCYTES # BLD AUTO: 0.9 K/UL (ref 1–4.8)
LYMPHOCYTES NFR BLD: 9.6 % (ref 18–48)
MAGNESIUM SERPL-MCNC: 1.9 MG/DL (ref 1.6–2.6)
MCH RBC QN AUTO: 32 PG (ref 27–31)
MCHC RBC AUTO-ENTMCNC: 32.8 G/DL (ref 32–36)
MCV RBC AUTO: 97 FL (ref 82–98)
MONOCYTES # BLD AUTO: 0.9 K/UL (ref 0.3–1)
MONOCYTES NFR BLD: 9.6 % (ref 4–15)
NEUTROPHILS # BLD AUTO: 7.3 K/UL (ref 1.8–7.7)
NEUTROPHILS NFR BLD: 79.8 % (ref 38–73)
NRBC BLD-RTO: 0 /100 WBC
PLATELET # BLD AUTO: 225 K/UL (ref 150–450)
PMV BLD AUTO: 10.3 FL (ref 9.2–12.9)
POCT GLUCOSE: 138 MG/DL (ref 70–110)
POCT GLUCOSE: 193 MG/DL (ref 70–110)
POCT GLUCOSE: 254 MG/DL (ref 70–110)
POTASSIUM SERPL-SCNC: 3.6 MMOL/L (ref 3.5–5.1)
PROT SERPL-MCNC: 5.5 G/DL (ref 6–8.4)
RBC # BLD AUTO: 4.19 M/UL (ref 4–5.4)
SODIUM SERPL-SCNC: 142 MMOL/L (ref 136–145)
WBC # BLD AUTO: 9.2 K/UL (ref 3.9–12.7)

## 2024-09-22 PROCEDURE — 25000003 PHARM REV CODE 250: Performed by: NURSE PRACTITIONER

## 2024-09-22 PROCEDURE — 97166 OT EVAL MOD COMPLEX 45 MIN: CPT

## 2024-09-22 PROCEDURE — 63600175 PHARM REV CODE 636 W HCPCS: Performed by: INTERNAL MEDICINE

## 2024-09-22 PROCEDURE — 97535 SELF CARE MNGMENT TRAINING: CPT

## 2024-09-22 PROCEDURE — 85025 COMPLETE CBC W/AUTO DIFF WBC: CPT | Performed by: INTERNAL MEDICINE

## 2024-09-22 PROCEDURE — 97116 GAIT TRAINING THERAPY: CPT

## 2024-09-22 PROCEDURE — 25000242 PHARM REV CODE 250 ALT 637 W/ HCPCS: Performed by: INTERNAL MEDICINE

## 2024-09-22 PROCEDURE — 36415 COLL VENOUS BLD VENIPUNCTURE: CPT | Performed by: INTERNAL MEDICINE

## 2024-09-22 PROCEDURE — 97161 PT EVAL LOW COMPLEX 20 MIN: CPT

## 2024-09-22 PROCEDURE — 96376 TX/PRO/DX INJ SAME DRUG ADON: CPT

## 2024-09-22 PROCEDURE — 99900035 HC TECH TIME PER 15 MIN (STAT)

## 2024-09-22 PROCEDURE — 94761 N-INVAS EAR/PLS OXIMETRY MLT: CPT

## 2024-09-22 PROCEDURE — 80053 COMPREHEN METABOLIC PANEL: CPT | Performed by: INTERNAL MEDICINE

## 2024-09-22 PROCEDURE — 27000221 HC OXYGEN, UP TO 24 HOURS

## 2024-09-22 PROCEDURE — 99900031 HC PATIENT EDUCATION (STAT)

## 2024-09-22 PROCEDURE — 25000003 PHARM REV CODE 250: Performed by: INTERNAL MEDICINE

## 2024-09-22 PROCEDURE — 96366 THER/PROPH/DIAG IV INF ADDON: CPT

## 2024-09-22 PROCEDURE — 83735 ASSAY OF MAGNESIUM: CPT | Performed by: INTERNAL MEDICINE

## 2024-09-22 PROCEDURE — 94640 AIRWAY INHALATION TREATMENT: CPT

## 2024-09-22 PROCEDURE — S4991 NICOTINE PATCH NONLEGEND: HCPCS | Performed by: INTERNAL MEDICINE

## 2024-09-22 RX ORDER — HYDRALAZINE HYDROCHLORIDE 20 MG/ML
10 INJECTION INTRAMUSCULAR; INTRAVENOUS EVERY 6 HOURS PRN
Status: DISCONTINUED | OUTPATIENT
Start: 2024-09-22 | End: 2024-09-22 | Stop reason: HOSPADM

## 2024-09-22 RX ADMIN — METOPROLOL TARTRATE 50 MG: 50 TABLET, FILM COATED ORAL at 09:09

## 2024-09-22 RX ADMIN — FAMOTIDINE 20 MG: 10 INJECTION INTRAVENOUS at 09:09

## 2024-09-22 RX ADMIN — GABAPENTIN 100 MG: 100 CAPSULE ORAL at 02:09

## 2024-09-22 RX ADMIN — IPRATROPIUM BROMIDE AND ALBUTEROL SULFATE 3 ML: 2.5; .5 SOLUTION RESPIRATORY (INHALATION) at 07:09

## 2024-09-22 RX ADMIN — GABAPENTIN 100 MG: 100 CAPSULE ORAL at 09:09

## 2024-09-22 RX ADMIN — INSULIN GLARGINE 30 UNITS: 100 INJECTION, SOLUTION SUBCUTANEOUS at 09:09

## 2024-09-22 RX ADMIN — GUAIFENESIN 400 MG: 200 SOLUTION ORAL at 02:09

## 2024-09-22 RX ADMIN — AZITHROMYCIN 500 MG: 500 INJECTION, POWDER, LYOPHILIZED, FOR SOLUTION INTRAVENOUS at 05:09

## 2024-09-22 RX ADMIN — CLOPIDOGREL BISULFATE 75 MG: 75 TABLET, FILM COATED ORAL at 09:09

## 2024-09-22 RX ADMIN — CILOSTAZOL 100 MG: 50 TABLET ORAL at 09:09

## 2024-09-22 RX ADMIN — LISINOPRIL 20 MG: 20 TABLET ORAL at 09:09

## 2024-09-22 RX ADMIN — ASPIRIN 162 MG: 81 TABLET, COATED ORAL at 09:09

## 2024-09-22 RX ADMIN — NICOTINE 1 PATCH: 21 PATCH, EXTENDED RELEASE TRANSDERMAL at 09:09

## 2024-09-22 RX ADMIN — GUAIFENESIN 400 MG: 200 SOLUTION ORAL at 09:09

## 2024-09-22 RX ADMIN — SENNOSIDES AND DOCUSATE SODIUM 2 TABLET: 8.6; 5 TABLET ORAL at 09:09

## 2024-09-22 RX ADMIN — LEVOTHYROXINE SODIUM 25 MCG: 0.03 TABLET ORAL at 05:09

## 2024-09-22 NOTE — CARE UPDATE
09/22/24 0733   Patient Assessment/Suction   Level of Consciousness (AVPU) alert   Respiratory Effort Normal;Unlabored   Expansion/Accessory Muscles/Retractions no use of accessory muscles   All Lung Fields Breath Sounds clear;equal bilaterally   Rhythm/Pattern, Respiratory pattern regular;unlabored;depth regular;no shortness of breath reported   Cough Frequency no cough   PRE-TX-O2   Device (Oxygen Therapy) nasal cannula   $ Is the patient on Low Flow Oxygen? Yes   Flow (L/min) (Oxygen Therapy) 2   SpO2 95 %   Pulse Oximetry Type Intermittent   $ Pulse Oximetry - Multiple Charge Pulse Oximetry - Multiple   Pulse 92   Resp 18   Aerosol Therapy   $ Aerosol Therapy Charges Aerosol Treatment   Daily Review of Necessity (SVN) completed   Respiratory Treatment Status (SVN) given   Treatment Route (SVN) mask;oxygen   Patient Position HOB elevated   Post Treatment Assessment (SVN) breath sounds unchanged   Signs of Intolerance (SVN) none   Breath Sounds Post-Respiratory Treatment   Throughout All Fields Post-Treatment All Fields   Throughout All Fields Post-Treatment no change   Post-treatment Heart Rate (beats/min) 70   Post-treatment Resp Rate (breaths/min) 18   Education   $ Education Bronchodilator;15 min

## 2024-09-22 NOTE — PLAN OF CARE
09/22/24 1004   Final Note   Assessment Type Final Discharge Note   Anticipated Discharge Disposition Home   Post-Acute Status   Discharge Delays None known at this time     Patient cleared for discharge from case management standpoint.    Follow up appointments scheduled and added to AVS.    Chart and discharge orders reviewed.  Patient discharged home with no further case management needs.

## 2024-09-22 NOTE — PT/OT/SLP EVAL
Occupational Therapy   Evaluation, tx    Name: Mary Cynthia Cryer  MRN: [id number]  Admitting Diagnosis: COPD exacerbation  Recent Surgery: * No surgery found *    The primary encounter diagnosis was Sepsis. Diagnoses of SOB (shortness of breath), NSTEMI (non-ST elevated myocardial infarction), Hypoxia, Chest pain, Chronic obstructive pulmonary disease with acute exacerbation, and COPD exacerbation were also pertinent to this visit.    Recommendations:     Discharge Recommendations: Low Intensity Therapy (with caregiver 24/7 assist)  Discharge Equipment Recommendations:  none  Barriers to discharge:  None    Assessment:     Mary Cynthia Cryer is a 84 y.o. female with a medical diagnosis of COPD exacerbation.  She presents with deconditioning. Pt exhibited jerky bodily movement with transition to EOB and in sitting thus needing Min A for sit balance. She performed bed mobility Mod A supine<>sit, Min A  with RW for sot <>stand, side stepped Min A with RW, poor balance. Toileting TA -for hygiene and replacement of clean items due to soiled brief from BM and PW leak. Performance deficits affecting function: weakness, impaired endurance, impaired self care skills, impaired functional mobility, gait instability, impaired balance, decreased lower extremity function, decreased safety awareness, impaired cardiopulmonary response to activity.    Continue with OT POC.     Rehab Prognosis: Good; patient would benefit from acute skilled OT services to address these deficits and reach maximum level of function.       Plan:     Patient to be seen 5 x/week to address the above listed problems via self-care/home management, therapeutic activities, therapeutic exercises  Plan of Care Expires: 10/22/24  Plan of Care Reviewed with: patient    Subjective     Chief Complaint: none  Patient/Family Comments/goals: pt agreeable.    Occupational Profile:  Living Environment: per pt, she lives alone in a [facility] with no steps; WIT with BIS and  GB.  Previous level of function: per pt report, she was Indep-Mod I BADLS, pt wears pull up underwear; ambulated Mod I with rollator; she has caregivers who spend at least 2 hrs with her proving transportation, meal prep and IADLS assistance.   Roles and Routines: sedentary. Pt has hx of dementia.  Equipment Used at Home: walker, rolling, grab bar, cane, quad, rollator, wheelchair (walk n tub)  Assistance upon Discharge: caregivers    Pain/Comfort:  Pain Rating 1: 0/10  Pain Rating Post-Intervention 1: 0/10    Patients cultural, spiritual, Anglican conflicts given the current situation: no    Objective:     Communicated with: nurse prior to session.  Patient found HOB elevated with oxygen, PureWick, telemetry upon OT entry to room.    General Precautions: Standard, fall, diabetic  Orthopedic Precautions: N/A  Braces: N/A  Respiratory Status: Nasal cannula, flow 2 L/min    Occupational Performance:    Bed Mobility:    Patient completed Rolling/Turning to Left with  contact guard assistance and with side rail  Patient completed Rolling/Turning to Right with contact guard assistance and with side rail  Patient completed Scooting/Bridging with minimum assistance  Patient completed Supine to Sit with moderate assistance and with side rail  Patient completed Sit to Supine with moderate assistance    Functional Mobility/Transfers:  Patient completed Sit <> Stand Transfer with minimum assistance  with  rolling walker   Functional Mobility: side stepped min a with RW staying close to bed due to bodily jerkiness and instability.    Activities of Daily Living:  Feeding:  independence .  Grooming: supervision HOB elevated  Lower Body Dressing: total assistance in bed   Toileting: total assistance in bed    Cognitive/Visual Perceptual:  Cognitive/Psychosocial Skills:     -       Oriented to: Person, Place, and Time   -       Follows Commands/attention:Follows one-step commands  -       Communication: clear/fluent  -        Memory: NT  -       Safety awareness/insight to disability: impaired   -       Mood/Affect/Coping skills/emotional control: Cooperative  Visual/Perceptual:      -Intact grossly    Physical Exam:  Balance:    -       siting: Keo  standing: min A  Postural examination/scapula alignment:    -       Rounded shoulders  Dominant hand:    -       right  Upper Extremity Range of Motion:     -       Right Upper Extremity: WFL  -       Left Upper Extremity: WFL  Upper Extremity Strength:    -       Right Upper Extremity: WFL except shld 3-/5  -       Left Upper Extremity: WFL except shld 3-/5   Strength:    -       Right Upper Extremity: WFL  -       Left Upper Extremity: WFL    AMPAC 6 Click ADL:  AMPAC Total Score: 16    Treatment & Education:  Purpose of OT and POC.  Pt seen for safe self care and fx mobility as stead above  Call don't fall explained. She acknowledged.     Patient left HOB elevated with all lines intact, call button in reach, and bed alarm on    GOALS:   Multidisciplinary Problems       Occupational Therapy Goals          Problem: Occupational Therapy    Goal Priority Disciplines Outcome Interventions   Occupational Therapy Goal     OT, PT/OT Progressing    Description: Goals to be met by: 10/10/2024     Patient will increase functional independence with ADLs by performing:    UE Dressing with Modified [place].  LE Dressing with Modified [place].  Grooming while standing at sink with Modified [place].  Toileting from toilet with Modified [place] for hygiene and clothing management.   Supine to sit with Modified [place].  Step transfer with Modified [place]  Toilet transfer to toilet with Modified [place].                         History:     Past Medical History:   Diagnosis Date    Allergy     Anticoagulant long-term use     Diabetes mellitus     Diabetes mellitus, type 2     Disorder of kidney and ureter     Hyperlipidemia     Hypertension     Obesity      Peripheral vascular disease     Thyroid disease     Type 2 diabetes mellitus          Past Surgical History:   Procedure Laterality Date    ANGIOGRAM, EXTREMITY, UNILATERAL Right 9/6/2023    Procedure: ANGIOGRAM, EXTREMITY, UNILATERAL;  Surgeon: Cruz Armas MD;  Location: [facility] OR;  Service: Vascular;  Laterality: Right;    ANGIOGRAPHY OF LOWER EXTREMITY N/A 12/11/2019    Procedure: Angiogram Extremity Unilateral;  Surgeon: Cruz Armas MD;  Location: [facility] CATH/EP LAB;  Service: Cardiology;  Laterality: N/A;    ANGIOGRAPHY OF LOWER EXTREMITY Right 9/6/2023    Procedure: Angiogram Extremity Unilateral;  Surgeon: Cruz Armas MD;  Location: [facility] CATH/EP LAB;  Service: General;  Laterality: Right;    ANKLE HARDWARE REMOVAL Right 12/15/2022    Procedure: REMOVAL, HARDWARE, ANKLE;  Surgeon: Nae Jerry MD;  Location: [facility] OR;  Service: Orthopedics;  Laterality: Right;    CORONARY ARTERY BYPASS GRAFT      EMBOLECTOMY, LOWER EXTREMITY Right 9/6/2023    Procedure: EMBOLECTOMY, LOWER EXTREMITY;  Surgeon: Cruz Armas MD;  Location: [facility];  Service: Vascular;  Laterality: Right;    HYSTERECTOMY      partial    IRRIGATION AND DEBRIDEMENT OF LOWER EXTREMITY Right 12/15/2022    Procedure: IRRIGATION AND DEBRIDEMENT, LOWER EXTREMITY;  Surgeon: Nae Jerry MD;  Location: [facility] OR;  Service: Orthopedics;  Laterality: Right;    OPEN REDUCTION AND INTERNAL FIXATION (ORIF) OF INJURY OF ANKLE Right 7/20/2022    Procedure: ORIF, ANKLE- medial mallelous;  Surgeon: Nae Jerry MD;  Location: [facility] OR;  Service: Orthopedics;  Laterality: Right;    PERCUTANEOUS TRANSLUMINAL ANGIOPLASTY N/A 9/6/2023    Procedure: PTA (ANGIOPLASTY, PERCUTANEOUS, TRANSLUMINAL);  Surgeon: Cruz Armas MD;  Location: [facility] CATH/EP LAB;  Service: General;  Laterality: N/A;    PERIPHERAL ANGIOGRAPHY  12/11/2019       Time Tracking:     OT Date of Treatment: 09/22/24  OT Start Time: 1358  OT Stop Time: 1417  OT Total Time (min):  19 min    Billable Minutes:Evaluation 10  Self Care/Home Management 9  Total Time 19    9/22/2024

## 2024-09-22 NOTE — NURSING
Pt has some confusion. Took out IV, Tele leads, oxygen and says she will be going home. Reoriented pt as needed.

## 2024-09-22 NOTE — PT/OT/SLP EVAL
Physical Therapy Evaluation    Patient Name:  Mary Cynthia Cryer   MRN:  [id number]    Recommendations:     Discharge Recommendations: Low Intensity Therapy   Discharge Equipment Recommendations: none   Barriers to discharge: Decreased caregiver support    Assessment:     Mary Cynthia Cryer is a 84 y.o. female admitted with a medical diagnosis of COPD exacerbation.  She presents with the following impairments/functional limitations: weakness, impaired endurance, gait instability, impaired functional mobility O2 sat is low at 91%.    Rehab Prognosis: Good; patient would benefit from acute skilled PT services to address these deficits and reach maximum level of function.    Recent Surgery: * No surgery found *      Plan:     During this hospitalization, patient to be seen 6 x/week to address the identified rehab impairments via gait training, therapeutic activities, therapeutic exercises and progress toward the following goals:    Plan of Care Expires:  10/25/24    Subjective     Chief Complaint: I'm not used to walking long distances.  Patient/Family Comments/goals: I use the RW on and off mostly when I go out.  Pain/Comfort:  Pain Rating 1: 0/10    Patients cultural, spiritual, Shinto conflicts given the current situation: no    Living Environment:  lives alone. Son come by everyday  Prior to admission, patients level of function was modified independent.  Equipment used at home: walker, rolling.  DME owned (not currently used): wheelchair.  Upon discharge, patient will have assistance from son.    Objective:     Communicated with nurse Bautista prior to session.  Patient found up in chair with oxygen, PureWick, telemetry  upon PT entry to room.    General Precautions: Standard, fall  Orthopedic Precautions:N/A   Braces: N/A  Respiratory Status: Nasal cannula, flow 2 L/min    Exams:  Cognitive Exam:  Patient is oriented to Person, Place, Time, and Situation  Gross Motor Coordination:  WFL  RUE ROM: WFL  RUE  Strength: WFL  LUE ROM: WFL  LUE Strength: WFL  RLE ROM: WFL  RLE Strength: WFL  LLE ROM: WFL  LLE Strength: WFL    Functional Mobility:  Bed Mobility:  mod Independent  Transfers:     Sit to Stand:  contact guard assistance with rolling walker  Bed to Chair: contact guard assistance with  rolling walker  using  Step Transfer  Gait: ambulated 120 ft RW and O2 min-CGA.  Balance: Retropulsive dynamic standing while fixing clothes.      AM-PAC 6 CLICK MOBILITY  Total Score:22     Treatment & Education:  Patient seen for initial evaluation and treatment. O2 sat 91% prior to activity. 93% with breathing and relaxation after activity. Sit to stand with CGA. Ambulated 120 ft with incidence of instability during door maneuvers and turning around to sit. Also demonstrating when retropulsion when letting go of the RW to fix garment. Up in chair as tolerated    Patient left up in chair with all lines intact and call button in reach.    GOALS:   Multidisciplinary Problems       Physical Therapy Goals          Problem: Physical Therapy    Goal Priority Disciplines Outcome Goal Variances Interventions   Physical Therapy Goal     PT, PT/OT Progressing     Description: Goals to be met by: discharge     Patient will increase functional independence with mobility by performin. Sit to stand transfer with Modified [place]  2. Bed to chair transfer with Modified [place] using Rolling Walker  3. Gait  x 200 feet with Modified [place] using Rolling Walker.                          History:     Past Medical History:   Diagnosis Date    Allergy     Anticoagulant long-term use     Diabetes mellitus     Diabetes mellitus, type 2     Disorder of kidney and ureter     Hyperlipidemia     Hypertension     Obesity     Peripheral vascular disease     Thyroid disease     Type 2 diabetes mellitus        Past Surgical History:   Procedure Laterality Date    ANGIOGRAM, EXTREMITY, UNILATERAL Right 2023    Procedure: ANGIOGRAM,  EXTREMITY, UNILATERAL;  Surgeon: Cruz Armas MD;  Location: [facility] OR;  Service: Vascular;  Laterality: Right;    ANGIOGRAPHY OF LOWER EXTREMITY N/A 12/11/2019    Procedure: Angiogram Extremity Unilateral;  Surgeon: Cruz Armas MD;  Location: [facility] CATH/EP LAB;  Service: Cardiology;  Laterality: N/A;    ANGIOGRAPHY OF LOWER EXTREMITY Right 9/6/2023    Procedure: Angiogram Extremity Unilateral;  Surgeon: Cruz Armas MD;  Location: [facility] CATH/EP LAB;  Service: General;  Laterality: Right;    ANKLE HARDWARE REMOVAL Right 12/15/2022    Procedure: REMOVAL, HARDWARE, ANKLE;  Surgeon: Nae Jerry MD;  Location: [facility] OR;  Service: Orthopedics;  Laterality: Right;    CORONARY ARTERY BYPASS GRAFT      EMBOLECTOMY, LOWER EXTREMITY Right 9/6/2023    Procedure: EMBOLECTOMY, LOWER EXTREMITY;  Surgeon: Cruz Armas MD;  Location: [facility];  Service: Vascular;  Laterality: Right;    HYSTERECTOMY      partial    IRRIGATION AND DEBRIDEMENT OF LOWER EXTREMITY Right 12/15/2022    Procedure: IRRIGATION AND DEBRIDEMENT, LOWER EXTREMITY;  Surgeon: Nae Jerry MD;  Location: [facility] OR;  Service: Orthopedics;  Laterality: Right;    OPEN REDUCTION AND INTERNAL FIXATION (ORIF) OF INJURY OF ANKLE Right 7/20/2022    Procedure: ORIF, ANKLE- medial mallelous;  Surgeon: Nae Jerry MD;  Location: [facility] OR;  Service: Orthopedics;  Laterality: Right;    PERCUTANEOUS TRANSLUMINAL ANGIOPLASTY N/A 9/6/2023    Procedure: PTA (ANGIOPLASTY, PERCUTANEOUS, TRANSLUMINAL);  Surgeon: Cruz Armas MD;  Location: [facility] CATH/EP LAB;  Service: General;  Laterality: N/A;    PERIPHERAL ANGIOGRAPHY  12/11/2019       Time Tracking:     PT Received On: 09/22/24  PT Start Time: 1035     PT Stop Time: 1105  PT Total Time (min): 30 min     Billable Minutes: Evaluation 15 and Gait Training 15      09/22/2024

## 2024-09-22 NOTE — PLAN OF CARE
Problem: Adult Inpatient Plan of Care  Goal: Plan of Care Review  Outcome: Met  Goal: Patient-Specific Goal (Individualized)  Outcome: Met  Goal: Absence of Hospital-Acquired Illness or Injury  Outcome: Met  Goal: Optimal Comfort and Wellbeing  Outcome: Met  Goal: Readiness for Transition of Care  Outcome: Met     Problem: Fall Injury Risk  Goal: Absence of Fall and Fall-Related Injury  Outcome: Met     Problem: Skin Injury Risk Increased  Goal: Skin Health and Integrity  Outcome: Met     Problem: Fall Injury Risk  Goal: Absence of Fall and Fall-Related Injury  Outcome: Met     Problem: Skin Injury Risk Increased  Goal: Skin Health and Integrity  Outcome: Met

## 2024-09-22 NOTE — ASSESSMENT & PLAN NOTE
I suspect she has COPD exacerbation going on     And I bet ,  she was hypoxemic all day and night prior to waking up weak.        The hypoxia , lead to her muscle weakness and her increased confusion       She will do much better now that he is on oxygen all night       PLAN    - hydrate   - fix electrolytes   - nc oxygen and keep on Continuous pulse ox and tele   - treat the copd exacerbation   Check Tsh free T4 , b12, folate , vit D  if not done recently   
Breathing improving   Patient does not like steroids, and at this point her breathing is improved enough that steroids may be held  Breathing much improved   Patient requiring oxygen, we will need home oxygen to maintain a sat of 92% on 2 L O2.  
Breathing improving   Patient does not like steroids, and at this point her breathing is improved enough that steroids may be held  Breathing worsens, we will change to oral steroids  She continues to improve, we will discharge tomorrow  
Improved status post IV fluids  
No Chest pain but has CAD history    CABG 1995     Has been fine since she said       I suspect this is just demand ischemia from the hypoxic conditions and stress of copd exacerbation       PLAN      - control HR and BP     - metoprolol 25 mg PO BID     - treat her copd exacerbation     - supplemental oxygen     - telemetry floor bed continuous pulse ox       - Aspirin po daily I started 162 mg     - Plavix I continued  75 mg     - and I did full dose Lovenox for now  1 mg / kg  sq q12       - consult Cardiology for this morning       - NPO       - new ECHO ordered     
Not on oxygen at home normally she says     I think she developed copd exacerbation leading to hypoxemia     Which then cause tissue hypoxia and her confusion and weakness and lethargy     She has left shift  And procalcitonin was elevated       PLAN    - IV steroids soluMedrol 40 mg Q8     - IVFs  1/2 NS with 20 KCL per bag x 24 hours    - robitussin 400 mg PO TID    - IV Zithromax and IV Rocephin       - check viral PCR panel     Flu and covid were both NEG      - continuous pulse ox and tele bed     - DuoNEBS QID scheduled   
Stable, demand ischemia  
Never

## 2024-09-22 NOTE — PLAN OF CARE
Armando sent a referral for home 02 to [facility]. [facility] requested a most recent which was sent . Dr. Jean was informed of the status. Pt should receive O2 .

## 2024-09-22 NOTE — PROGRESS NOTES
Patient admitted with shortness of breath and COPD exacerbation for which she received oxygen, steroids, nebulized albuterol and ipratropium treatments and antibiotics.  Patient has improved, end-stage she has no shortness of breath or chest tightness.  She is hemodynamically stable and ready for discharge, however she still requires 2 L of oxygen to maintain an O2 sat of 92% which is appropriate for her COPD.  Without oxygen she desats down to 87%.  Patient would benefit from home oxygen at 2 liters/minute.

## 2024-09-22 NOTE — HOSPITAL COURSE
Patient rapidly improved with treatment for UTI with ceftriaxone.  Patient received steroids DuoNebs and azithromycin for COPD exacerbation, her breathing is much improved now.  O2 weaned.  Steroid stopped.  Antibiotics stopped.  Echocardiogram was normal, patient's labs have been unremarkable.  She has remained hemodynamically stable, and is okay for discharge.  Patient will follow up within 1 week with primary care physician.  Patient will require home oxygen, 2 liters/minute.  She is currently requiring 2 liters/minute to maintain an O2 sat of 92%.  On room air patient we will desat to 87%.

## 2024-09-22 NOTE — PLAN OF CARE
Problem: Physical Therapy  Goal: Physical Therapy Goal  Description: Goals to be met by: discharge     Patient will increase functional independence with mobility by performin. Sit to stand transfer with Modified [place]  2. Bed to chair transfer with Modified [place] using Rolling Walker  3. Gait  x 200 feet with Modified [place] using Rolling Walker.     Outcome: Progressing   Patient seen for initial evaluation and treatment.

## 2024-09-22 NOTE — CARE UPDATE
09/21/24 2002   Patient Assessment/Suction   Level of Consciousness (AVPU) alert   Respiratory Effort Unlabored;Normal   Expansion/Accessory Muscles/Retractions no use of accessory muscles   All Lung Fields Breath Sounds clear;equal bilaterally   Rhythm/Pattern, Respiratory pattern regular;no shortness of breath reported   Cough Frequency no cough   PRE-TX-O2   Device (Oxygen Therapy) nasal cannula   $ Is the patient on Low Flow Oxygen? Yes   Flow (L/min) (Oxygen Therapy) 2   SpO2 95 %   Pulse Oximetry Type Continuous   $ Pulse Oximetry - Multiple Charge Pulse Oximetry - Multiple   Pulse 92   Resp 18   Positioning   Body Position position changed independently   Head of Bed (HOB) Positioning HOB at 30 degrees   Positioning/Transfer Devices pillows;in use   Aerosol Therapy   $ Aerosol Therapy Charges Aerosol Treatment   Daily Review of Necessity (SVN) completed   Respiratory Treatment Status (SVN) given   Treatment Route (SVN) mask;oxygen   Patient Position HOB elevated   Post Treatment Assessment (SVN) increased aeration   Signs of Intolerance (SVN) none   Education   $ Education Bronchodilator;15 min

## 2024-09-22 NOTE — PLAN OF CARE
Problem: Occupational Therapy  Goal: Occupational Therapy Goal  Description: Goals to be met by: 10/10/2024     Patient will increase functional independence with ADLs by performing:    UE Dressing with Modified [place].  LE Dressing with Modified [place].  Grooming while standing at sink with Modified [place].  Toileting from toilet with Modified [place] for hygiene and clothing management.   Supine to sit with Modified [place].  Step transfer with Modified [place]  Toilet transfer to toilet with Modified [place].    Outcome: Progressing

## 2024-09-22 NOTE — DISCHARGE SUMMARY
[facility] Medicine  Discharge Summary      Patient Name: Mary Cynthia Cryer  MRN: [id number]  AKANKSHA: [id number]  Patient Class: IP- Inpatient  Admission Date: 9/19/2024  Hospital Length of Stay: 2 days  Discharge Date and Time:  09/22/2024 9:44 AM  Attending Physician: Roderick Jean DO   Discharging Provider: Roderick Jean DO  Primary Care Provider: Emmy Piña MD    Primary Care Team: Networked reference to record PCT     HPI:   84 WF with PMHx DMII on insulin , CAD with hx of CABG 1995 ,  HLD, COPD not on oxygen at home , HTN, CKD, dementia, TIA, PVD presents to ED with cough for a couple of days with development of fever today per family.  Patient has seemed a little more confused to family and generally weak.  She has also complained of some shortness of breath with exertion.  Denies any chest pain, abdominal pain, nausea or vomiting, bowel changes or dysuria.  Did recently travel with family out of state and was exposed to persons that later tested positive for COVID-19.     Smoker   1 pack per day   Non drinker    Lives Alone       She says she just woke up feeling weak     Was fine night before     She noticed fever and 24 hours of cough that was new.  Non productive     No sick contracts    She has not noticed significant SOB  No chest pain         Otherwise nothing new she says         In ER her WBC normal but had Left shift 80% Neutrophils     Potassium was low   BUN CR up some but she has CKD     BNP mildly up  Troponin was up and going up on repeat higher   No chest pain   EKG normal       UA had some leuk esterase   but mild       CTA was done and negative for PE or PNA          Plan is to admit her to our service for the confusion , fever,  and elevated troponin    * No surgery found *      Hospital Course:   Patient rapidly improved with treatment for UTI with ceftriaxone.  Patient received steroids DuoNebs and azithromycin for COPD exacerbation, her breathing  is much improved now.  O2 weaned.  Steroid stopped.  Antibiotics stopped.  Echocardiogram was normal, patient's labs have been unremarkable.  She has remained hemodynamically stable, and is okay for discharge.  Patient will follow up within 1 week with primary care physician.  Patient will require home oxygen, 2 liters/minute.  She is currently requiring 2 liters/minute to maintain an O2 sat of 92%.  On room air patient we will desat to 87%.     Goals of Care Treatment Preferences:  Code Status: Full Code      SDOH Screening:  The patient was screened for utility difficulties, food insecurity, transport difficulties, housing insecurity, and interpersonal safety and there were no concerns identified this admission.     Consults:   Consults (From admission, onward)          Status Ordering Provider     Inpatient consult to Cardiology  Once        Provider:  Vicky Ray MD Completed SHOPE, ADAM M            No new Assessment & Plan notes have been filed under this hospital service since the last note was generated.  Service: Hospital Medicine    Final Active Diagnoses:    Diagnosis Date Noted POA    PRINCIPAL PROBLEM:  COPD exacerbation [J44.1] 09/20/2024 Yes    Weakness [R53.1] 09/20/2024 Yes    Elevated troponin [R79.89] 09/20/2024 Yes    Tobacco dependency [F17.200] 09/20/2024 Unknown      Problems Resolved During this Admission:       Discharged Condition: good    Disposition: Home or Self Care    Follow Up:   Follow-up Information       Emmy Piña MD Follow up in 1 week(s).    Specialties: Hospitalist, Internal Medicine  Contact information:  [address]  [address]  [facility] [address]  [phone]                           Patient Instructions:      Ambulatory referral/consult to Smoking Cessation Program   Standing Status: Future   Referral Priority: Routine Referral Type: Consultation   Referral Reason: Specialty Services Required   Requested Specialty: CTTS   Number of Visits  Requested: 1     Notify your health care provider if you experience any of the following:  increased confusion or weakness     Notify your health care provider if you experience any of the following:  persistent dizziness, light-headedness, or visual disturbances     Notify your health care provider if you experience any of the following:  worsening rash     Notify your health care provider if you experience any of the following:  severe persistent headache     Notify your health care provider if you experience any of the following:  difficulty breathing or increased cough     Notify your health care provider if you experience any of the following:  redness, tenderness, or signs of infection (pain, swelling, redness, odor or green/yellow discharge around incision site)     Notify your health care provider if you experience any of the following:  severe uncontrolled pain     Notify your health care provider if you experience any of the following:  persistent nausea and vomiting or diarrhea     Notify your health care provider if you experience any of the following:  temperature >100.4     Home O2 eval (desaturation screen)     Reason for not Prescribing Nicotine Replacement     Order Specific Question Answer Comments   Reason for not Prescribing: Patient refused      Activity as tolerated       Significant Diagnostic Studies: Labs: CMP   Recent Labs   Lab 09/21/24  0413 09/22/24  0526    142   K 4.3 3.6    108   CO2 28 29   * 157*   BUN 21 22   CREATININE 1.1 1.2   CALCIUM 9.3 9.0   PROT 6.5 5.5*   ALBUMIN 3.3* 3.0*   BILITOT 0.5 0.5   ALKPHOS 59 53*   AST 16 16   ALT 11 13   ANIONGAP 7* 5*    and CBC   Recent Labs   Lab 09/21/24  0413 09/22/24  0526   WBC 9.94 9.20   HGB 13.5 13.4   HCT 42.2 40.8    225       Pending Diagnostic Studies:       None           Medications:  Reconciled Home Medications:      Medication List        CONTINUE taking these medications      albuterol 90  "mcg/actuation inhaler  Commonly known as: PROVENTIL/VENTOLIN HFA  Inhale 2 puffs into the lungs every 6 (six) hours as needed for Shortness of Breath or Wheezing.     cilostazoL 100 MG Tab  Commonly known as: PLETAL  Take 100 mg by mouth 2 (two) times daily.     clopidogreL 75 mg tablet  Commonly known as: PLAVIX  Take 75 mg by mouth once daily.     DROPLET INSULIN SYRINGE 1 mL 31 gauge x 15/64" Syrg  Generic drug: insulin syringe-needle U-100     FARXIGA 10 mg tablet  Generic drug: dapagliflozin propanediol  Take 1 tablet by mouth once daily.     gabapentin 100 MG capsule  Commonly known as: NEURONTIN  Take 100 mg by mouth 3 (three) times daily.     insulin glargine U-100 (Lantus) 100 unit/mL injection  Inject 35 Units into the skin 2 (two) times a day.     insulin syringe-needle U-100 0.3 mL 30 gauge x 5/16" Syrg  by Other route.     isosorbide mononitrate 30 MG 24 hr tablet  Commonly known as: IMDUR  Take 30 mg by mouth once daily.     levothyroxine 25 MCG tablet  Commonly known as: SYNTHROID  Take 25 mcg by mouth once daily.     lisinopriL 20 MG tablet  Commonly known as: PRINIVIL,ZESTRIL  Take 20 mg by mouth once daily.     metoprolol tartrate 50 MG tablet  Commonly known as: LOPRESSOR  Take 50 mg by mouth 2 (two) times daily.     nitroGLYCERIN 0.3 MG SL tablet  Commonly known as: NITROSTAT  Place 0.3 mg under the tongue every 5 (five) minutes as needed for Chest pain.     NovoLIN R Regular U100 Insulin 100 unit/mL injection  Generic drug: insulin regular  Inject 6 Units into the skin 2 (two) times daily before meals.     pantoprazole 40 MG tablet  Commonly known as: PROTONIX  Take 40 mg by mouth once daily.     traMADoL 50 mg tablet  Commonly known as: ULTRAM  Take 50 mg by mouth every 8 (eight) hours as needed for Pain.     TRUE METRIX GLUCOSE TEST STRIP Strp  Generic drug: blood sugar diagnostic     TRUEPLUS LANCETS 30 gauge Misc  Generic drug: lancets            ASK your doctor about these medications  "     amLODIPine 5 MG tablet  Commonly known as: NORVASC  Take 5 mg by mouth once daily.     aspirin 81 MG EC tablet  Commonly known as: ECOTRIN  Take 81 mg by mouth once daily.     rosuvastatin 40 MG Tab  Commonly known as: CRESTOR  Take 40 mg by mouth once daily.              Indwelling Lines/Drains at time of discharge:   Lines/Drains/Airways       None                   Time spent on the discharge of patient: 35 minutes         Roderick Jean DO  Department of Hospital Medicine  [facility]

## 2024-09-22 NOTE — CARE UPDATE
09/22/24 1330   PRE-TX-O2   Device (Oxygen Therapy) room air   SpO2 (!) 87 %   Home Oxygen Qualification   $ Home O2 Qualification Tech time 15 minutes   Room Air SpO2 At Rest (!) 87 %   SpO2 During Ambulation on O2 92 %   Ambulation O2 LPM 2 LPM   SpO2 Post Ambulation 92 %   Post Ambulation O2 LPM 2 LPM

## 2024-09-24 LAB
BACTERIA BLD CULT: NORMAL
BACTERIA BLD CULT: NORMAL
OHS QRS DURATION: 146 MS
OHS QRS DURATION: 150 MS
OHS QTC CALCULATION: 472 MS
OHS QTC CALCULATION: 535 MS

## (undated) DEVICE — BLADE BEAVER #69  AVER6900

## (undated) DEVICE — SUTURE PROLENE 7-0 BV-1 24 8702H

## (undated) DEVICE — SOLUTION IRRI NS BOTTLE 1000ML R5200-01

## (undated) DEVICE — GLOVE BIOGEL MICRO SURGEON PINK SZ 7.5

## (undated) DEVICE — DRAPE C-ARM (FITS NEW C-ARM) 07-CA108

## (undated) DEVICE — SHEATH PINNACLE 5FRX10CM W/GUIDEWIRE

## (undated) DEVICE — DRESSING POST OP MEPILEX AG 4X8

## (undated) DEVICE — Device

## (undated) DEVICE — KIT INFLATION MERIT (IN8152, HP9200E)

## (undated) DEVICE — STAPLER SKIN NON ROTATE PXW35

## (undated) DEVICE — SUTURE MONOCRYL 4-0 PS-2 27 MCP426H

## (undated) DEVICE — PAD BOVIE ADULT

## (undated) DEVICE — TRAY SKIN PREP DRY

## (undated) DEVICE — CLIP APPLIER SM MCS20

## (undated) DEVICE — SOLUTION SCRUB IODINE 4OZ

## (undated) DEVICE — SHEATH PINNACLE 6FRX10CM W/GUIDEWIRE

## (undated) DEVICE — SUTURE PROLENE 7-0 BV175-6 24 8735H

## (undated) DEVICE — SUTURE VICRYL 3-0 18 SH VCP864D

## (undated) DEVICE — SUTURE PROLENE 6-0 BV-1 30 8709H

## (undated) DEVICE — DRAPE IOBAN 23X33 6651EZ

## (undated) DEVICE — TIP BOVIE 4 0014A

## (undated) DEVICE — WINGSET SLBCS 23X.75 12LUER

## (undated) DEVICE — CATHETER ANGIO OMNI FLUSH 10732201

## (undated) DEVICE — TRAY VASCULAR SLIDELL MEMORIAL

## (undated) DEVICE — DRAPE CVS SPLIT SHEET 29154

## (undated) DEVICE — CATHETER EMBOLECTOMY 5FR 80CM

## (undated) DEVICE — LOOP MAXI RED 30-722

## (undated) DEVICE — GUIDEWIRE STIFF ANGLED 035X260 LAUREATE

## (undated) DEVICE — SHEATH INTRODUCER DESTINATION 6FX45

## (undated) DEVICE — SOLUTION DURAPREP 8630

## (undated) DEVICE — GUIDEWIRE STF .035X150CM ANG

## (undated) DEVICE — STOPCOCK IV E WAY INTRALOCK

## (undated) DEVICE — HEMOSTAT SURGICEL 4X8

## (undated) DEVICE — CLIP APPLIER MEDIUM MSM20